# Patient Record
Sex: MALE | Race: WHITE | HISPANIC OR LATINO | Employment: FULL TIME | URBAN - METROPOLITAN AREA
[De-identification: names, ages, dates, MRNs, and addresses within clinical notes are randomized per-mention and may not be internally consistent; named-entity substitution may affect disease eponyms.]

---

## 2019-02-26 DIAGNOSIS — Z00.00 WELL ADULT EXAM: ICD-10-CM

## 2019-05-02 ENCOUNTER — HOSPITAL ENCOUNTER (OUTPATIENT)
Dept: RADIOLOGY | Facility: MEDICAL CENTER | Age: 59
End: 2019-05-02
Attending: FAMILY MEDICINE

## 2019-05-02 ENCOUNTER — HOSPITAL ENCOUNTER (OUTPATIENT)
Facility: MEDICAL CENTER | Age: 59
End: 2019-05-02
Attending: FAMILY MEDICINE
Payer: COMMERCIAL

## 2019-05-02 ENCOUNTER — NON-PROVIDER VISIT (OUTPATIENT)
Dept: OTHER | Facility: MEDICAL CENTER | Age: 59
End: 2019-05-02

## 2019-05-02 DIAGNOSIS — Z00.00 WELL ADULT EXAM: ICD-10-CM

## 2019-05-02 DIAGNOSIS — Z00.00 PHYSICAL EXAM, ROUTINE: ICD-10-CM

## 2019-05-02 LAB
25(OH)D3 SERPL-MCNC: 9 NG/ML (ref 30–100)
ALBUMIN SERPL BCP-MCNC: 4.8 G/DL (ref 3.2–4.9)
ALBUMIN/GLOB SERPL: 1.7 G/DL
ALP SERPL-CCNC: 75 U/L (ref 30–99)
ALT SERPL-CCNC: 37 U/L (ref 2–50)
ANION GAP SERPL CALC-SCNC: 10 MMOL/L (ref 0–11.9)
APPEARANCE UR: CLEAR
AST SERPL-CCNC: 25 U/L (ref 12–45)
BACTERIA #/AREA URNS HPF: NEGATIVE /HPF
BASOPHILS # BLD AUTO: 0.9 % (ref 0–1.8)
BASOPHILS # BLD: 0.06 K/UL (ref 0–0.12)
BILIRUB SERPL-MCNC: 0.7 MG/DL (ref 0.1–1.5)
BILIRUB UR QL STRIP.AUTO: NEGATIVE
BUN SERPL-MCNC: 17 MG/DL (ref 8–22)
CALCIUM SERPL-MCNC: 9.8 MG/DL (ref 8.5–10.5)
CHLORIDE SERPL-SCNC: 106 MMOL/L (ref 96–112)
CO2 SERPL-SCNC: 22 MMOL/L (ref 20–33)
COLOR UR: YELLOW
CREAT SERPL-MCNC: 0.81 MG/DL (ref 0.5–1.4)
CREAT UR-MCNC: 140.4 MG/DL
CRP SERPL HS-MCNC: 1.8 MG/L (ref 0–7.5)
EOSINOPHIL # BLD AUTO: 0.15 K/UL (ref 0–0.51)
EOSINOPHIL NFR BLD: 2.3 % (ref 0–6.9)
EPI CELLS #/AREA URNS HPF: NEGATIVE /HPF
ERYTHROCYTE [DISTWIDTH] IN BLOOD BY AUTOMATED COUNT: 41.2 FL (ref 35.9–50)
EST. AVERAGE GLUCOSE BLD GHB EST-MCNC: 111 MG/DL
GLOBULIN SER CALC-MCNC: 2.8 G/DL (ref 1.9–3.5)
GLUCOSE SERPL-MCNC: 117 MG/DL (ref 65–99)
GLUCOSE UR STRIP.AUTO-MCNC: NEGATIVE MG/DL
HBA1C MFR BLD: 5.5 % (ref 0–5.6)
HCT VFR BLD AUTO: 43.6 % (ref 42–52)
HCV AB SER QL: NEGATIVE
HGB BLD-MCNC: 15.6 G/DL (ref 14–18)
HYALINE CASTS #/AREA URNS LPF: ABNORMAL /LPF
IMM GRANULOCYTES # BLD AUTO: 0.01 K/UL (ref 0–0.11)
IMM GRANULOCYTES NFR BLD AUTO: 0.2 % (ref 0–0.9)
KETONES UR STRIP.AUTO-MCNC: NEGATIVE MG/DL
LEUKOCYTE ESTERASE UR QL STRIP.AUTO: NEGATIVE
LYMPHOCYTES # BLD AUTO: 1.64 K/UL (ref 1–4.8)
LYMPHOCYTES NFR BLD: 25.5 % (ref 22–41)
MCH RBC QN AUTO: 32.2 PG (ref 27–33)
MCHC RBC AUTO-ENTMCNC: 35.8 G/DL (ref 33.7–35.3)
MCV RBC AUTO: 90.1 FL (ref 81.4–97.8)
MICRO URNS: ABNORMAL
MICROALBUMIN UR-MCNC: 5.4 MG/DL
MICROALBUMIN/CREAT UR: 38 MG/G (ref 0–30)
MONOCYTES # BLD AUTO: 0.33 K/UL (ref 0–0.85)
MONOCYTES NFR BLD AUTO: 5.1 % (ref 0–13.4)
NEUTROPHILS # BLD AUTO: 4.23 K/UL (ref 1.82–7.42)
NEUTROPHILS NFR BLD: 66 % (ref 44–72)
NITRITE UR QL STRIP.AUTO: NEGATIVE
NRBC # BLD AUTO: 0 K/UL
NRBC BLD-RTO: 0 /100 WBC
PH UR STRIP.AUTO: 6 [PH]
PLATELET # BLD AUTO: 223 K/UL (ref 164–446)
PMV BLD AUTO: 9.6 FL (ref 9–12.9)
POTASSIUM SERPL-SCNC: 4.1 MMOL/L (ref 3.6–5.5)
PROT SERPL-MCNC: 7.6 G/DL (ref 6–8.2)
PROT UR QL STRIP: NEGATIVE MG/DL
PSA SERPL-MCNC: 1.24 NG/ML (ref 0–4)
RBC # BLD AUTO: 4.84 M/UL (ref 4.7–6.1)
RBC # URNS HPF: ABNORMAL /HPF
RBC UR QL AUTO: ABNORMAL
SODIUM SERPL-SCNC: 138 MMOL/L (ref 135–145)
SP GR UR STRIP.AUTO: 1.02
T4 FREE SERPL-MCNC: 0.75 NG/DL (ref 0.53–1.43)
TSH SERPL DL<=0.005 MIU/L-ACNC: 2.64 UIU/ML (ref 0.38–5.33)
UROBILINOGEN UR STRIP.AUTO-MCNC: 0.2 MG/DL
WBC # BLD AUTO: 6.4 K/UL (ref 4.8–10.8)
WBC #/AREA URNS HPF: ABNORMAL /HPF

## 2019-05-02 PROCEDURE — 306723 US-TOTAL VASCULAR SCREENING (S/P)

## 2019-05-02 PROCEDURE — 76700 US EXAM ABDOM COMPLETE: CPT

## 2019-05-02 PROCEDURE — 4410556 CT-CARDIAC SCORING

## 2019-05-02 PROCEDURE — 0126T US-CIMT DUPLEX: CPT

## 2019-05-02 PROCEDURE — 71046 X-RAY EXAM CHEST 2 VIEWS: CPT

## 2019-05-02 RX ORDER — PRAVASTATIN SODIUM 20 MG
20 TABLET ORAL NIGHTLY
COMMUNITY
End: 2019-06-03

## 2019-05-02 RX ORDER — OMEPRAZOLE 40 MG/1
40 CAPSULE, DELAYED RELEASE ORAL DAILY
COMMUNITY
End: 2019-06-03

## 2019-05-02 RX ORDER — M-VIT,TX,IRON,MINS/CALC/FOLIC 27MG-0.4MG
1 TABLET ORAL DAILY
COMMUNITY

## 2019-05-02 RX ORDER — AMLODIPINE BESYLATE AND BENAZEPRIL HYDROCHLORIDE 10; 20 MG/1; MG/1
1 CAPSULE ORAL DAILY
COMMUNITY
End: 2022-04-01 | Stop reason: SDUPTHER

## 2019-05-04 LAB
APO B100 SERPL-MCNC: 121 MG/DL (ref 55–140)
LPA SERPL-MCNC: 6 MG/DL
SHBG SERPL-SCNC: 32 NMOL/L (ref 11–80)
TESTOST FREE MFR SERPL: 1.9 % (ref 1.6–2.9)
TESTOST FREE SERPL-MCNC: 65 PG/ML (ref 47–244)
TESTOST SERPL-MCNC: 349 NG/DL (ref 300–890)

## 2019-05-06 LAB
CHOLEST SERPL-MCNC: 217 MG/DL
HDL PARTICAL NO Q4363: >41 UMOL/L
HDL SIZE Q4361: 8.1 NM
HDLC SERPL-MCNC: 46 MG/DL (ref 40–59)
HLD.LARGE SERPL-SCNC: <2.8 UMOL/L
L VLDL PART NO Q4357: 12.6 NMOL/L
LDL SERPL QN: 19.7 NM
LDL SERPL-SCNC: 1800 NMOL/L
LDL SMALL SERPL-SCNC: >1085 NMOL/L
LDLC SERPL CALC-MCNC: 100 MG/DL
PATHOLOGY STUDY: ABNORMAL
TRIGL SERPL-MCNC: 356 MG/DL (ref 30–149)
VLDL SIZE Q4362: 57.5 NM

## 2019-05-17 ENCOUNTER — NON-PROVIDER VISIT (OUTPATIENT)
Dept: OTHER | Facility: MEDICAL CENTER | Age: 59
End: 2019-05-17

## 2019-05-17 ENCOUNTER — OFFICE VISIT (OUTPATIENT)
Dept: OTHER | Facility: MEDICAL CENTER | Age: 59
End: 2019-05-17

## 2019-05-17 VITALS
BODY MASS INDEX: 29.38 KG/M2 | OXYGEN SATURATION: 95 % | DIASTOLIC BLOOD PRESSURE: 62 MMHG | RESPIRATION RATE: 12 BRPM | HEART RATE: 70 BPM | SYSTOLIC BLOOD PRESSURE: 138 MMHG | HEIGHT: 67 IN | WEIGHT: 187.2 LBS | TEMPERATURE: 98.9 F

## 2019-05-17 DIAGNOSIS — Z86.19 HISTORY OF HEPATITIS: ICD-10-CM

## 2019-05-17 DIAGNOSIS — D22.9 MULTIPLE PIGMENTED NEVI: ICD-10-CM

## 2019-05-17 DIAGNOSIS — K80.20 ASYMPTOMATIC GALLSTONES: ICD-10-CM

## 2019-05-17 DIAGNOSIS — Z83.3 FAMILY HISTORY OF DIABETES MELLITUS (DM): ICD-10-CM

## 2019-05-17 DIAGNOSIS — E88.819 INSULIN RESISTANCE: ICD-10-CM

## 2019-05-17 DIAGNOSIS — K21.9 GASTROESOPHAGEAL REFLUX DISEASE, ESOPHAGITIS PRESENCE NOT SPECIFIED: ICD-10-CM

## 2019-05-17 DIAGNOSIS — R93.1 AGATSTON CAC SCORE, >400: ICD-10-CM

## 2019-05-17 DIAGNOSIS — E55.9 VITAMIN D DEFICIENCY: ICD-10-CM

## 2019-05-17 DIAGNOSIS — Z00.00 WELL ADULT EXAM: Primary | ICD-10-CM

## 2019-05-17 DIAGNOSIS — R73.01 ELEVATED FASTING GLUCOSE: ICD-10-CM

## 2019-05-17 DIAGNOSIS — E78.5 DYSLIPIDEMIA: ICD-10-CM

## 2019-05-17 DIAGNOSIS — Z82.49 FAMILY HISTORY OF CHF (CONGESTIVE HEART FAILURE): ICD-10-CM

## 2019-05-17 DIAGNOSIS — L98.9 CHEST SKIN LESION: ICD-10-CM

## 2019-05-17 DIAGNOSIS — H91.93 HIGH FREQUENCY HEARING LOSS OF BOTH EARS: ICD-10-CM

## 2019-05-17 DIAGNOSIS — K14.8 LESION OF TONGUE: ICD-10-CM

## 2019-05-17 DIAGNOSIS — Z91.013 SHELLFISH ALLERGY: ICD-10-CM

## 2019-05-17 DIAGNOSIS — K13.0 LESION OF LIP: ICD-10-CM

## 2019-05-17 DIAGNOSIS — E88.810 METABOLIC SYNDROME: ICD-10-CM

## 2019-05-17 DIAGNOSIS — Z87.891 EX-SMOKER FOR MORE THAN 1 YEAR: ICD-10-CM

## 2019-05-17 DIAGNOSIS — R94.31 ABNORMAL ECG DURING EXERCISE STRESS TEST: ICD-10-CM

## 2019-05-17 DIAGNOSIS — Z82.3 FAMILY HISTORY OF STROKE OR TRANSIENT ISCHEMIC ATTACK IN MOTHER: ICD-10-CM

## 2019-05-17 DIAGNOSIS — Z23 NEED FOR SHINGLES VACCINE: ICD-10-CM

## 2019-05-17 DIAGNOSIS — I10 BENIGN ESSENTIAL HTN: ICD-10-CM

## 2019-05-17 DIAGNOSIS — R93.2 HIGHLY ECHOGENIC LIVER ON ULTRASOUND: ICD-10-CM

## 2019-05-17 DIAGNOSIS — Z82.49 FAMILY HISTORY OF HEART DISEASE: ICD-10-CM

## 2019-05-17 DIAGNOSIS — K44.9 HIATAL HERNIA: ICD-10-CM

## 2019-05-17 NOTE — LETTER
"May 26, 2019        Chalo Hollis  [address]  [place] [address]      CC: Dr. Mady Mercado MD    Dear Víctor:    Thank you for participating in [facility].  I enjoyed meeting you today and performing your Executive History and Physical examination.  We covered a great deal of information, and I have summarized my Assessment and Plan below.  Please carefully review all the information in this packet.  I do suggest you schedule an appointment with a Primary Care Provider soon to review the results of your examination and develop a plan moving forward. Also, I am referring you to see a Cardiology Provider in light of the cardio-metabolic issues we identified.     The cardio metabolic issues we talked about today include: Atherosclerotic plaque of your coronary/heart arteries, abnormal exercise stress ECG, overweight status, increased waist circumference, metabolic syndrome, insulin resistance, hypertension, family history, remote smoking history, dyslipidemia, elevated fasting glucose, vitamin D deficiency, echogenic liver on ultrasound, and asymptomatic gallstones.  In addition to seeing your primary care physician and a cardiology provider, I do recommend the following: The best treatment for most of these conditions is Therapeutic Lifestyle Changes.  Specifically, I strongly recommend eating \"real food, mostly plants, not too much\", daily exercise, striving for a normal weight/BMI, active stress management, 7-8 hours of quality sleep per night, a loving social environment, and an altruistic philosophy.  In light of the tongue and skin lesions we discussed, I do recommend you discuss these with your primary care physician and consideration be made for a dermatology consultation and specialist consultation for tongue lesion.  Please obtain the shingles vaccine series, complete the advance directives paperwork I dispensed, and obtain your next screening colonoscopy in the fall 2019.  Please see " "my comments below regarding medications considerations.     If you have any questions or concerns, please don't hesitate to call.        Sincerely,        Chito Blunt M.D.    ASSESSMENT:    Encounter Diagnoses   Name Primary?   • Executive History and Physical Examination Yes   • Agatston CAC score, 1365, multi-vessel, asymptomatic    • Abnormal ECG during exercise stress test    • BMI 29.0-29.9, Waist Circumference: 41\"    • Metabolic syndrome    • Insulin resistance    • Benign essential HTN    • Family history of heart disease    • Family history of diabetes mellitus (DM)    • Family history of stroke or transient ischemic attack in mother    • Family history of CHF (congestive heart failure)    • Ex-smoker for more than 1 year    • Shellfish allergy    • High frequency hearing loss of both ears    • History of hepatitis    • Gastroesophageal reflux disease, esophagitis presence not specified    • Hiatal hernia    • Dyslipidemia    • Elevated fasting glucose    • Vitamin D deficiency    • Lesion of tongue    • Lesion of lip    • Multiple pigmented skin lesions    • Right pectoral area irregular pigmented skin lesion, changing    • Highly echogenic liver on ultrasound    • Asymptomatic gallstones    • Need for shingles vaccine        PLAN:    Total Face-to-Face time spent with patient: 90 minutes  Amount of time spent counseling patient and/or coordinating care: 50 minutes    The nature of patient counseling as below:  -Patient Education  -Differential Diagnoses and treatment options discussed  -Risks, benefits, alternatives discussed  -Labs reviewed with patient in detail  -Imaging/ETT/Spirometry/vision/hearing reports reviewed with patient in detail  -Health Maintenance Exam issues discussed  -Exercise recommendations  -Dietary recommendations discussed  -Weight Loss strategies discussed  -Therapeutic Lifestyle Changes discussed    The nature of coordination of care as below:  -Medications added: He currently " takes Amlodipine-Benazepril and Pravastatin and aspirin. Patient to keep daily BP diary and present this to Primary Care Provider at next visit. Primary Care Provider may wish to consider change to Rosuvastatin. I do recommend Vitamin D3 OTC 5000 IU/day supplementation  -Continue present chronic medications  -Referrals: I recommend patient schedule appointment with Primary Care Provider soon to review results of today's examination and develop a plan moving forward, particularly with regard to cardio-metabolic issues and tongue/skin lesions   -I have referred patient to Cardiology in light of above diagnoses   -Other: Shingrix vaccine series recommended. Advance Directives paperwork discussed and dispensed. He will be due for repeat screening Colonoscopy in fall 2019.   -Seek medical attention immediately if worse    FOLLOW-UP:  -With Primary Care Provider soon  -Cardiology referral initiated.        Electronically Signed

## 2019-05-17 NOTE — PROGRESS NOTES
"[facility] madvertise [facility]    EXECUTIVE HISTORY AND PHYSICAL  Performed by Dr. Chito Blunt    SUBJECTIVE:    Chief Complaint   Patient presents with   • Executive Physical   • Other     Abnormal Exercise Treadmill Stress Test   • Coronary Artery Disease     CT-Cardiac Score: 1365 (multivessel)   • Other     Overweight Status   • Hypertension   • Gastrophageal Reflux     & Hiatal Hernia   • Other     Family history of: stroke, MI, CHF, DM   • Other     Remote ex-smoker   • Hyperlipidemia   • Other     Metabolic Syndrome & Insulin Resistance   • Hyperglycemia   • Abnormal Labs   • Vitamin D Deficiency   • Hearing Problem     Some High Frequency hearing loss   • Skin Lesion     left upper lip, changing   • Other     Tongue lesion   • Other     Increased Liver echogenicity on U/S   • Gallstones     Asymptomatic, seen on U/S   • Other     Due for Shingrix series       Víctor Hollis is a 58 y.o. male,   New Patient @ [facility]    Preventive medicine issues discussed:  abuse, aspirin, dental, Alcohol, Tobacco, HIV/AIDS, injuries, mental health/depression, nutrition, exercise, occupational health, sexual behavior, UV exposure, advance directives, Cancer Screening. Vaccines.      PROBLEM #1-HISTORY OF PRESENT ILLNESS  PATIENT STATEMENT OF PROBLEM - Cardio-metabolic related issues  ONSET - He is currently taking anti-HTN and statin and aspirin medications  COURSE - Asymptomatic. No ASCVD event history. Remote ex-smoker. Family history: HTN, \"heart disease\", DM, CHF, CVA. BMI: 29.7. WC: 41\". BP: 138/62. See abnormal, asymptomatic, Exercise Treadmill Stress results below. Current pertinent imaging: CTCS: 1365 (multivessel), TVS: no evidence of atherosclerosis. CIMT: 42 vs 58 years. U/S: increased liver echogenicity, and gallstones (patient not previously aware, asymptomatic). Current pertinent labs:    HIGH & INT RISK: " "Tchol/TRIG/LDL-P/SmallLDL/L-VLDL/L-HDL/LDL-S/VLDL-S/HDL-S/FBS(117)/UACR(38)/ApoB/hsCRP/VitaminD(9)/TRIG:HDL(7.7).    Counseled at length. He meets criteria for both Metabolic Syndrome and Insulin Resistance  INTENSITY/STATUS/LOCATION/RADIATION - variable/asymptomatic, subclinical/as above  AGGRAVATORS - Multifactorial including inadequate Therapeutic Lifestyle Changes, genetics, others?  RELIEVERS - medications?  TREATMENTS/COMPLIANCE/@GOAL? - as above/ inadequate Therapeutic Lifestyle Changes/ no     PROBLEM #2-HISTORY OF PRESENT ILLNESS  PATIENT STATEMENT OF PROBLEM - Skin & Tongue lesions  ONSET - variable  COURSE - He has a 3x3 mg flesh colored lesion of right distal tongue, present for past 2 years. Chronic raised, tan colored, rounded lesion left upper lip area that patient states is enlarging. Also, multiple pigmented lesions including 6mm diameter pigmented somewhat irregular bordered pigmented lesion of right pectoral area that may be changing/enlarging. Counseled.  INTENSITY/STATUS/LOCATION/RADIATION - as above/ as above/ as above/ no   AGGRAVATORS - ?  RELIEVERS - observation   TREATMENTS/COMPLIANCE/@GOAL? - as above/ na/ no     PROBLEM #3-HISTORY OF PRESENT ILLNESS  PATIENT STATEMENT OF PROBLEM - Health Maintenance issues  ONSET - discussed today  COURSE - he is due for Shingrix vaccine series. He will be due for \"every 5 year\" Colonoscopy in Fall of 2019. I discussed and dispensed Advance Directives paperwork.     PROBLEM #4-HISTORY OF PRESENT ILLNESS  Existing Chronic conditions  GERD & Hiatal hernia  Some high frequency hearing loss  History of \"hepatitis\" in 2006    Diagnosing METABOLIC SYNDROME  -?-Must have 3 or more of the following 5 Risk Factors(Patient meets criteria # 1,2,4,5)     RISK FACTOR    DEFINING LEVEL  1-Abdominal Obesity        Waist [email or web address]   Men (North Americans)  >102 cm (>40 inches)   Women (North Americans)  >88 cm (>35 inches)  (see literature for " "Ethnic Group waist circumference differences)  2-Triglycerides ?150 mg/dL (or on treatment for this lipid disorder)  3-HDL Cholesterol    Men  <40 mg/dL (or on treatment for this lipid disorder)   Women <50 mg/dL (or on treatment for this lipid disorder)  4-Blood Pressure  ?130 systolic or ?84 diastolic (or on HTN treatment)  5-Fasting Glucose ?100 mg/dL(or previously diagnosed DM or Ins. Resistance)  (FYI: If FBS ?100 mg/dL, then patient also has Insulin Resistance)    Synonyms  Hypertension-hyperglycemia-hyperuricemia syndrome   Syndrome X   Dysmetabolic syndrome X   Insulin resistance syndrome   Metabolic dyslipidemia   The deadly quartet (upper-body obesity, glucose intolerance, hypertriglyceridemia, and hypertension)   Civilization syndrome  Reaven Syndrome    Diagnosing INSULIN RESISTANCE: Any 1 of following (Patient meets criteria # 1,2,3)  1. Presence of METABOLIC SYNDROME  2. TRIGLYCERIDE/HDL RATIO:  - >3.5 = IR in Caucasians  - ?3.0 = IR in /Vietnamese Americans  - ?2.0 = IR in Non- Blacks  3. Fasting Blood Sugar ? 100 mg/dL         (If FBS > 126, then DM2)  4. Oral Glucose Tolerance Test   - One hour glucose: ? 125 mg/dL   - (If > 150, significantly increased risk of developing DM2)  - Two hour glucose: ? 120 mg/dL  - (120-139=only 33% B-cell fx. 140-199=only 15% B-cell fx)   - (200 or above=DM2 and only 10% B-cell fx.)  - PRE-DIABETES, a type of Insulin Resistance:  o Two hour glucose of 140 to 199  5. A1c ? 6.5%                     (or ? 5.7 % AND the following 2 Dental Parameters:    1- ? 26% of Gum Pockets are ?5mm depth    2- ? 4 Missing Teeth)      Allergies   Allergen Reactions   • Other Food Itching     Shell fish; Pt states \"at times skin rash and/or puffy/itchy eyes\"       There are no active problems to display for this patient.      Current Outpatient Prescriptions on File Prior to Visit   Medication Sig Dispense Refill   • amlodipine-benazepril (LOTREL) 10-20 MG per capsule Take " "1 Cap by mouth every day.     • pravastatin (PRAVACHOL) 20 MG Tab Take 20 mg by mouth every evening. Per Dr. Mercado increase dosage to 40mg on Feb 13, 2019 for 3 months     • omeprazole (PRILOSEC) 40 MG delayed-release capsule Take 40 mg by mouth every day.     • aspirin 81 MG tablet Take 81 mg by mouth every day.     • therapeutic multivitamin-minerals (THERAGRAN-M) Tab Take 1 Tab by mouth every day.     • Probiotic Product (PROBIOTIC FORMULA PO) Take  by mouth.     • Fexofenadine HCl (ALLEGRA ALLERGY PO) Take  by mouth.       No current facility-administered medications on file prior to visit.        Social History     Social History   • Marital status:      Spouse name: N/A   • Number of children: N/A   • Years of education: N/A     Occupational History   • Not on file.     Social History Main Topics   • Smoking status: Former Smoker     Packs/day: 0.25     Years: 3.00     Types: Cigarettes     Start date: 1980     Quit date: 1983   • Smokeless tobacco: Never Used      Comment: \"3-4 cigarettes per day average\"   • Alcohol use Yes      Comment: 2-3 drinks/day    • Drug use: No   • Sexual activity: Not on file     Other Topics Concern   • Not on file     Social History Narrative   • No narrative on file       Family History   Problem Relation Age of Onset   • Hypertension Mother    • Diabetes Father    • Hypertension Sister    • Hypertension Brother    • Diabetes Brother    • Hypertension Son        Patient's Past, Social, and Family History reviewed     REVIEW OF SYMPTOMS:               Pertinent Positives as above.    All other systems reviewed and negative.     OBJECTIVE:    /62   Pulse 70   Temp 37.2 °C (98.9 °F) (Temporal)   Resp 12   Ht 1.689 m (5' 6.5\")   Wt 84.9 kg (187 lb 3.2 oz)   SpO2 95%   BMI 29.76 kg/m²   Body mass index is 29.76 kg/m².    Well developed, well nourished overweight male, no acute distress, non-ill appearing. Comfortable, appears stated age, pleasant and cooperative, " Alert and Oriented x 3.   HEAD: atraumatic, normocephalic   EYES: Conjunctiva normal, EOMI, PERRLA, acuity grossly intact.   EARS/NOSE/THROAT: TM's normal, no SSX of infection, no perforation, no hemotympanum, acuity grossly intact. Oropharynx: 3x3 mg flesh colored lesion of right distal tongue, present for past 2 years. Nares: benign.   NECK: supple, no adenopathy, no thyromegaly or nodules, no JVD, no carotid bruits.   CHEST/LUNGS: clear to auscultation and percussion bilaterally. No adventitious breath sounds.   CARDIOVASCULAR: regular rate and rhythm, no murmur. PMI not displaced. Good central and peripheral pulses.   BACK: no CVA tenderness.   ABDOMEN: soft, non-tender, non-distended, no masses, no hepatosplenomegaly. Normal active bowel tones.   : deferred.   Rectal: deferred.   Extremities: warm/well-perfused, no cyanosis, clubbing, or edema.   SKIN:  He has a 3x3 mg flesh colored lesion of right distal tongue, present for past 2 years. Chronic raised, tan colored, rounded lesion left upper lip area that patient states is enlarging. Also, multiple pigmented lesions including 6mm diameter pigmented somewhat irregular bordered pigmented lesion of right pectoral area that may be changing/enlarging.   Neuro: Mental Status: Alert and Oriented x 3. CN II-XII grossly intact. Gait normal. Non-focal, intact. Normal strength, sensation    EXERCISE STRESS TEST REPORT:    Interpreted by me    INTERPRETATION:  Patient achieved 97% of maximum predicted heart rate with physiological response in blood pressure. Horizontal or downsloping ST depression with exercise in II/II/aVF/V4-V6. ST elevation in aVR/V1/V2. Asymptomatic. No significant ventricular extrasystoles, no ventricular tachycardia, no new atrial fibrillation or supraventricular tachycardia, and  no new heart blocks.  Patient denied chest pain, severe dyspnea, dizziness, or ataxia.     CONCLUSION:  Abnormal Exercise Stress Test with asymptomatic horizontal or  "downsloping ST depression with exercise in II/II/aVF/V4-V6, and ST elevation in aVR/V1/V2.     ASSESSMENT:    Encounter Diagnoses   Name Primary?   • Executive History and Physical Examination Yes   • Agatston CAC score, 1365, multi-vessel, asymptomatic    • Abnormal ECG during exercise stress test    • BMI 29.0-29.9, Waist Circumference: 41\"    • Metabolic syndrome    • Insulin resistance    • Benign essential HTN    • Family history of heart disease    • Family history of diabetes mellitus (DM)    • Family history of stroke or transient ischemic attack in mother    • Family history of CHF (congestive heart failure)    • Ex-smoker for more than 1 year    • Shellfish allergy    • High frequency hearing loss of both ears    • History of hepatitis    • Gastroesophageal reflux disease, esophagitis presence not specified    • Hiatal hernia    • Dyslipidemia    • Elevated fasting glucose    • Vitamin D deficiency    • Lesion of tongue    • Lesion of lip    • Multiple pigmented skin lesions    • Right pectoral area irregular pigmented skin lesion, changing    • Highly echogenic liver on ultrasound    • Asymptomatic gallstones    • Need for shingles vaccine        PLAN:    Total Face-to-Face time spent with patient: 90 minutes  Amount of time spent counseling patient and/or coordinating care: 50 minutes    The nature of patient counseling as below:  -Patient Education  -Differential Diagnoses and treatment options discussed  -Risks, benefits, alternatives discussed  -Labs reviewed with patient in detail  -Imaging/ETT/Spirometry/vision/hearing reports reviewed with patient in detail  -Health Maintenance Exam issues discussed  -Exercise recommendations  -Dietary recommendations discussed  -Weight Loss strategies discussed  -Therapeutic Lifestyle Changes discussed    The nature of coordination of care as below:  -Medications added: He currently takes Amlodipine-Benazepril and Pravastatin and aspirin. Patient to keep daily BP " diary and present this to Primary Care Provider at next visit. Primary Care Provider may wish to consider change to Rosuvastatin. I do recommend Vitamin D3 OTC 5000 IU/day supplementation  -Continue present chronic medications  -Referrals: I recommend patient schedule appointment with Primary Care Provider soon to review results of today's examination and develop a plan moving forward, particularly with regard to cardio-metabolic issues and tongue/skin lesions   -I have referred patient to Cardiology in light of above diagnoses   -Other: Shingrix vaccine series recommended. Advance Directives paperwork discussed and dispensed. He will be due for repeat screening Colonoscopy in fall 2019.   -Seek medical attention immediately if worse    FOLLOW-UP:  -With Primary Care Provider soon  -Cardiology referral initiated.

## 2019-05-26 NOTE — PATIENT INSTRUCTIONS
Current Outpatient Prescriptions Ordered in [facility]   Medication Sig Dispense Refill   • amlodipine-benazepril (LOTREL) 10-20 MG per capsule Take 1 Cap by mouth every day.     • pravastatin (PRAVACHOL) 20 MG Tab Take 20 mg by mouth every evening. Per Dr. Mercado increase dosage to 40mg on Feb 13, 2019 for 3 months     • omeprazole (PRILOSEC) 40 MG delayed-release capsule Take 40 mg by mouth every day.     • aspirin 81 MG tablet Take 81 mg by mouth every day.     • therapeutic multivitamin-minerals (THERAGRAN-M) Tab Take 1 Tab by mouth every day.     • Probiotic Product (PROBIOTIC FORMULA PO) Take  by mouth.     • Fexofenadine HCl (ALLEGRA ALLERGY PO) Take  by mouth.       No current Epic-ordered facility-administered medications on file.

## 2019-05-30 ENCOUNTER — TELEPHONE (OUTPATIENT)
Dept: CARDIOLOGY | Facility: MEDICAL CENTER | Age: 59
End: 2019-05-30

## 2019-05-30 ENCOUNTER — OFFICE VISIT (OUTPATIENT)
Dept: CARDIOLOGY | Facility: MEDICAL CENTER | Age: 59
End: 2019-05-30
Payer: COMMERCIAL

## 2019-05-30 DIAGNOSIS — R94.39 ABNORMAL STRESS TEST: ICD-10-CM

## 2019-05-30 PROCEDURE — 99204 OFFICE O/P NEW MOD 45 MIN: CPT | Performed by: INTERNAL MEDICINE

## 2019-05-30 ASSESSMENT — ENCOUNTER SYMPTOMS
HEADACHES: 0
BRUISES/BLEEDS EASILY: 0
SHORTNESS OF BREATH: 0
EYE DISCHARGE: 0
CHILLS: 0
NERVOUS/ANXIOUS: 0
MYALGIAS: 0
BLURRED VISION: 0
PND: 0
HEARTBURN: 0
NAUSEA: 0
DEPRESSION: 0
FEVER: 0
DIZZINESS: 0
COUGH: 0
PALPITATIONS: 0

## 2019-05-30 NOTE — TELEPHONE ENCOUNTER
Patient is scheduled on 6-5-19 for a C w/poss with Dr. Childers. No meds to stop and patient to check in at 9:00 for an 11:00 procedure. H&P was done on 5-30-19 by Dr. Khan. Pre admit is scheduled on 6-3-19 at 8:15.

## 2019-05-30 NOTE — TELEPHONE ENCOUNTER
Patient is scheduled on 6-5-19 for a C w/poss with Dr. Childers. No meds to stop and pt to check in at 9:00 for an 11:00 procedure. H&P was done on 5-30-19 by Dr. Khan. Pre admit is scheduled on 6-3-19 at 8:15.

## 2019-05-30 NOTE — PROGRESS NOTES
"Chief Complaint   Patient presents with   • Abnormal Cardiac Function Testing       Subjective:   Víctor Hollis is a 58 y.o. male who presents today in consultation at the request of Dr. Chito Blunt for abnormal stress test while undergoing regular physical exam.  This gentleman has metabolic syndrome with treated hypertension, slight elevation of glycohemoglobin and treated hyperlipidemia.    He remains asymptomatic vis-à-vis his heart.  Recent treadmill stress test demonstrated up to 4 mm of ST segment depression during physical stress.  He also has a very high coronary artery calcium score of greater than 1300.    Past Medical History:   Diagnosis Date   • Allergy    • Hepatitis 2006   • Hiatal hernia    • Hypertension      Past Surgical History:   Procedure Laterality Date   • TENDON REPAIR Right 2008     Family History   Problem Relation Age of Onset   • Hypertension Mother    • Diabetes Father    • Hypertension Sister    • Hypertension Brother    • Diabetes Brother    • Hypertension Son      Social History     Social History   • Marital status:      Spouse name: N/A   • Number of children: N/A   • Years of education: N/A     Occupational History   • Not on file.     Social History Main Topics   • Smoking status: Former Smoker     Packs/day: 0.25     Years: 3.00     Types: Cigarettes     Start date: 1980     Quit date: 1983   • Smokeless tobacco: Never Used      Comment: \"3-4 cigarettes per day average\"   • Alcohol use Yes      Comment: 2-3 drinks/day    • Drug use: No   • Sexual activity: Not on file     Other Topics Concern   • Not on file     Social History Narrative   • No narrative on file     Allergies   Allergen Reactions   • Other Food Itching     Shell fish; Pt states \"at times skin rash and/or puffy/itchy eyes\"     Outpatient Encounter Prescriptions as of 5/30/2019   Medication Sig Dispense Refill   • metoprolol (LOPRESSOR) 25 MG Tab Take 1 Tab by mouth 2 times a day. 60 Tab 11   • " amlodipine-benazepril (LOTREL) 10-20 MG per capsule Take 1 Cap by mouth every day.     • pravastatin (PRAVACHOL) 20 MG Tab Take 20 mg by mouth every evening. Per Dr. Mercado increase dosage to 40mg on Feb 13, 2019 for 3 months     • omeprazole (PRILOSEC) 40 MG delayed-release capsule Take 40 mg by mouth every day.     • aspirin 81 MG tablet Take 81 mg by mouth every day.     • therapeutic multivitamin-minerals (THERAGRAN-M) Tab Take 1 Tab by mouth every day.     • Probiotic Product (PROBIOTIC FORMULA PO) Take  by mouth.     • Fexofenadine HCl (ALLEGRA ALLERGY PO) Take  by mouth.       No facility-administered encounter medications on file as of 5/30/2019.      Review of Systems   Constitutional: Negative for chills, fever and malaise/fatigue.   Eyes: Negative for blurred vision and discharge.   Respiratory: Negative for cough and shortness of breath.    Cardiovascular: Negative for chest pain, palpitations, leg swelling and PND.   Gastrointestinal: Negative for heartburn and nausea.   Genitourinary: Negative for dysuria and urgency.   Musculoskeletal: Negative for myalgias.   Skin: Negative for itching and rash.   Neurological: Negative for dizziness and headaches.   Endo/Heme/Allergies: Negative for environmental allergies. Does not bruise/bleed easily.   Psychiatric/Behavioral: Negative for depression. The patient is not nervous/anxious.         Objective:   There were no vitals taken for this visit.    Physical Exam   Constitutional: He is oriented to person, place, and time. He appears well-developed and well-nourished.   Neck: No JVD present.   Cardiovascular: Normal rate, regular rhythm and intact distal pulses.  Exam reveals no gallop and no friction rub.    No murmur heard.  Abdominal: Soft. There is no tenderness.   Musculoskeletal: He exhibits no edema.   Neurological: He is alert and oriented to person, place, and time.   Skin: Skin is warm and dry.       Assessment:     1. Abnormal stress test  CL-LEFT  HEART CATHETERIZATION WITH POSSIBLE INTERVENTION       Medical Decision Making:  Today's Assessment / Status / Plan:   This patient is at high risk of having asymptomatic severe coronary artery disease.  The benefits alternatives and risks of procedure including conscious sedation have been explained to the patient and he is agreed to have diagnostic coronary arteriography with with possible intervention as needed.  Procedure is scheduled as soon as we can get it on the outpatient schedule.  In the meantime he is to increase pravastatin to 80 mg/day  Metoprolol 25 mg twice daily has been prescribed.  If symptoms that suggest angina occur, it is recommended he call 911  Thank you for this consultation

## 2019-05-30 NOTE — LETTER
"     [facility]   [address]  [place] [address]  Phone: [phone]  Fax: [phone]              Víctor Hollis  1960    Encounter Date: 5/30/2019    Jaiden Khan M.D.          PROGRESS NOTE:  Chief Complaint   Patient presents with   • Abnormal Cardiac Function Testing       Subjective:   Víctor Hollis is a 58 y.o. male who presents today in consultation at the request of Dr. Chito Blunt for abnormal stress test while undergoing regular physical exam.  This gentleman has metabolic syndrome with treated hypertension, slight elevation of glycohemoglobin and treated hyperlipidemia.    He remains asymptomatic vis-à-vis his heart.  Recent treadmill stress test demonstrated up to 4 mm of ST segment depression during physical stress.  He also has a very high coronary artery calcium score of greater than 1300.    Past Medical History:   Diagnosis Date   • Allergy    • Hepatitis 2006   • Hiatal hernia    • Hypertension      Past Surgical History:   Procedure Laterality Date   • TENDON REPAIR Right 2008     Family History   Problem Relation Age of Onset   • Hypertension Mother    • Diabetes Father    • Hypertension Sister    • Hypertension Brother    • Diabetes Brother    • Hypertension Son      Social History     Social History   • Marital status:      Spouse name: N/A   • Number of children: N/A   • Years of education: N/A     Occupational History   • Not on file.     Social History Main Topics   • Smoking status: Former Smoker     Packs/day: 0.25     Years: 3.00     Types: Cigarettes     Start date: 1980     Quit date: 1983   • Smokeless tobacco: Never Used      Comment: \"3-4 cigarettes per day average\"   • Alcohol use Yes      Comment: 2-3 drinks/day    • Drug use: No   • Sexual activity: Not on file     Other Topics Concern   • Not on file     Social History Narrative   • No narrative on file     Allergies   Allergen Reactions   • Other Food " "Itching     Shell fish; Pt states \"at times skin rash and/or puffy/itchy eyes\"     Outpatient Encounter Prescriptions as of 5/30/2019   Medication Sig Dispense Refill   • metoprolol (LOPRESSOR) 25 MG Tab Take 1 Tab by mouth 2 times a day. 60 Tab 11   • amlodipine-benazepril (LOTREL) 10-20 MG per capsule Take 1 Cap by mouth every day.     • pravastatin (PRAVACHOL) 20 MG Tab Take 20 mg by mouth every evening. Per Dr. Mercado increase dosage to 40mg on Feb 13, 2019 for 3 months     • omeprazole (PRILOSEC) 40 MG delayed-release capsule Take 40 mg by mouth every day.     • aspirin 81 MG tablet Take 81 mg by mouth every day.     • therapeutic multivitamin-minerals (THERAGRAN-M) Tab Take 1 Tab by mouth every day.     • Probiotic Product (PROBIOTIC FORMULA PO) Take  by mouth.     • Fexofenadine HCl (ALLEGRA ALLERGY PO) Take  by mouth.       No facility-administered encounter medications on file as of 5/30/2019.      Review of Systems   Constitutional: Negative for chills, fever and malaise/fatigue.   Eyes: Negative for blurred vision and discharge.   Respiratory: Negative for cough and shortness of breath.    Cardiovascular: Negative for chest pain, palpitations, leg swelling and PND.   Gastrointestinal: Negative for heartburn and nausea.   Genitourinary: Negative for dysuria and urgency.   Musculoskeletal: Negative for myalgias.   Skin: Negative for itching and rash.   Neurological: Negative for dizziness and headaches.   Endo/Heme/Allergies: Negative for environmental allergies. Does not bruise/bleed easily.   Psychiatric/Behavioral: Negative for depression. The patient is not nervous/anxious.         Objective:   There were no vitals taken for this visit.    Physical Exam   Constitutional: He is oriented to person, place, and time. He appears well-developed and well-nourished.   Neck: No JVD present.   Cardiovascular: Normal rate, regular rhythm and intact distal pulses.  Exam reveals no gallop and no friction rub.    No " murmur heard.  Abdominal: Soft. There is no tenderness.   Musculoskeletal: He exhibits no edema.   Neurological: He is alert and oriented to person, place, and time.   Skin: Skin is warm and dry.       Assessment:     1. Abnormal stress test  CL-LEFT HEART CATHETERIZATION WITH POSSIBLE INTERVENTION       Medical Decision Making:  Today's Assessment / Status / Plan:   This patient is at high risk of having asymptomatic severe coronary artery disease.  The benefits alternatives and risks of procedure including conscious sedation have been explained to the patient and he is agreed to have diagnostic coronary arteriography with with possible intervention as needed.  Procedure is scheduled as soon as we can get it on the outpatient schedule.  In the meantime he is to increase pravastatin to 80 mg/day  Metoprolol 25 mg twice daily has been prescribed.  If symptoms that suggest angina occur, it is recommended he call 911  Thank you for this consultation      No Recipients

## 2019-05-30 NOTE — LETTER
"     [facility]   [address]  [place] [address]  Phone: [phone]  Fax: [phone]              Víctor Hollis  1960    Encounter Date: 5/30/2019    Jaiden Khan M.D.          PROGRESS NOTE:  Chief Complaint   Patient presents with   • Abnormal Cardiac Function Testing       Subjective:   Víctor Hollis is a 58 y.o. male who presents today in consultation at the request of Dr. Chito Lynn for abnormal stress test while undergoing regular physical exam.  This gentleman has metabolic syndrome with treated hypertension, slight elevation of glycohemoglobin and treated hyperlipidemia.    He remains asymptomatic vis-à-vis his heart.  Recent treadmill stress test demonstrated up to 4 mm of ST segment depression during physical stress.  He also has a very high coronary artery calcium score of greater than 1300.    Past Medical History:   Diagnosis Date   • Allergy    • Hepatitis 2006   • Hiatal hernia    • Hypertension      Past Surgical History:   Procedure Laterality Date   • TENDON REPAIR Right 2008     Family History   Problem Relation Age of Onset   • Hypertension Mother    • Diabetes Father    • Hypertension Sister    • Hypertension Brother    • Diabetes Brother    • Hypertension Son      Social History     Social History   • Marital status:      Spouse name: N/A   • Number of children: N/A   • Years of education: N/A     Occupational History   • Not on file.     Social History Main Topics   • Smoking status: Former Smoker     Packs/day: 0.25     Years: 3.00     Types: Cigarettes     Start date: 1980     Quit date: 1983   • Smokeless tobacco: Never Used      Comment: \"3-4 cigarettes per day average\"   • Alcohol use Yes      Comment: 2-3 drinks/day    • Drug use: No   • Sexual activity: Not on file     Other Topics Concern   • Not on file     Social History Narrative   • No narrative on file     Allergies   Allergen Reactions   • Other Food " "Itching     Shell fish; Pt states \"at times skin rash and/or puffy/itchy eyes\"     Outpatient Encounter Prescriptions as of 5/30/2019   Medication Sig Dispense Refill   • metoprolol (LOPRESSOR) 25 MG Tab Take 1 Tab by mouth 2 times a day. 60 Tab 11   • amlodipine-benazepril (LOTREL) 10-20 MG per capsule Take 1 Cap by mouth every day.     • pravastatin (PRAVACHOL) 20 MG Tab Take 20 mg by mouth every evening. Per Dr. Mercado increase dosage to 40mg on Feb 13, 2019 for 3 months     • omeprazole (PRILOSEC) 40 MG delayed-release capsule Take 40 mg by mouth every day.     • aspirin 81 MG tablet Take 81 mg by mouth every day.     • therapeutic multivitamin-minerals (THERAGRAN-M) Tab Take 1 Tab by mouth every day.     • Probiotic Product (PROBIOTIC FORMULA PO) Take  by mouth.     • Fexofenadine HCl (ALLEGRA ALLERGY PO) Take  by mouth.       No facility-administered encounter medications on file as of 5/30/2019.      Review of Systems   Constitutional: Negative for chills, fever and malaise/fatigue.   Eyes: Negative for blurred vision and discharge.   Respiratory: Negative for cough and shortness of breath.    Cardiovascular: Negative for chest pain, palpitations, leg swelling and PND.   Gastrointestinal: Negative for heartburn and nausea.   Genitourinary: Negative for dysuria and urgency.   Musculoskeletal: Negative for myalgias.   Skin: Negative for itching and rash.   Neurological: Negative for dizziness and headaches.   Endo/Heme/Allergies: Negative for environmental allergies. Does not bruise/bleed easily.   Psychiatric/Behavioral: Negative for depression. The patient is not nervous/anxious.         Objective:   There were no vitals taken for this visit.    Physical Exam   Constitutional: He is oriented to person, place, and time. He appears well-developed and well-nourished.   Neck: No JVD present.   Cardiovascular: Normal rate, regular rhythm and intact distal pulses.  Exam reveals no gallop and no friction rub.    No " murmur heard.  Abdominal: Soft. There is no tenderness.   Musculoskeletal: He exhibits no edema.   Neurological: He is alert and oriented to person, place, and time.   Skin: Skin is warm and dry.       Assessment:     1. Abnormal stress test  CL-LEFT HEART CATHETERIZATION WITH POSSIBLE INTERVENTION       Medical Decision Making:  Today's Assessment / Status / Plan:   This patient is at high risk of having asymptomatic severe coronary artery disease.  The benefits alternatives and risks of procedure including conscious sedation have been explained to the patient and he is agreed to have diagnostic coronary arteriography with with possible intervention as needed.  Procedure is scheduled as soon as we can get it on the outpatient schedule.  In the meantime he is to increase pravastatin to 80 mg/day  Metoprolol 25 mg twice daily has been prescribed.  If symptoms that suggest angina occur, it is recommended he call 911  Thank you for this consultation      Mady Mercado M.D.  [address]  [place] [address]  VIA Facsimile: [phone]     Chito Blunt M.D.  [address]  [place] [address]  VIA In Basket

## 2019-06-03 DIAGNOSIS — Z01.812 PRE-OPERATIVE LABORATORY EXAMINATION: ICD-10-CM

## 2019-06-03 DIAGNOSIS — Z01.810 PRE-OPERATIVE CARDIOVASCULAR EXAMINATION: ICD-10-CM

## 2019-06-03 LAB
ALBUMIN SERPL BCP-MCNC: 4.8 G/DL (ref 3.2–4.9)
ALBUMIN/GLOB SERPL: 1.9 G/DL
ALP SERPL-CCNC: 68 U/L (ref 30–99)
ALT SERPL-CCNC: 32 U/L (ref 2–50)
ANION GAP SERPL CALC-SCNC: 9 MMOL/L (ref 0–11.9)
APTT PPP: 28.8 SEC (ref 24.7–36)
AST SERPL-CCNC: 24 U/L (ref 12–45)
BILIRUB SERPL-MCNC: 0.8 MG/DL (ref 0.1–1.5)
BUN SERPL-MCNC: 13 MG/DL (ref 8–22)
CALCIUM SERPL-MCNC: 9.3 MG/DL (ref 8.5–10.5)
CHLORIDE SERPL-SCNC: 105 MMOL/L (ref 96–112)
CO2 SERPL-SCNC: 26 MMOL/L (ref 20–33)
CREAT SERPL-MCNC: 0.85 MG/DL (ref 0.5–1.4)
EKG IMPRESSION: NORMAL
ERYTHROCYTE [DISTWIDTH] IN BLOOD BY AUTOMATED COUNT: 40.9 FL (ref 35.9–50)
GLOBULIN SER CALC-MCNC: 2.5 G/DL (ref 1.9–3.5)
GLUCOSE SERPL-MCNC: 122 MG/DL (ref 65–99)
HCT VFR BLD AUTO: 43.8 % (ref 42–52)
HGB BLD-MCNC: 15 G/DL (ref 14–18)
INR PPP: 0.99 (ref 0.87–1.13)
MCH RBC QN AUTO: 31.1 PG (ref 27–33)
MCHC RBC AUTO-ENTMCNC: 34.2 G/DL (ref 33.7–35.3)
MCV RBC AUTO: 90.7 FL (ref 81.4–97.8)
PLATELET # BLD AUTO: 215 K/UL (ref 164–446)
PMV BLD AUTO: 9.3 FL (ref 9–12.9)
POTASSIUM SERPL-SCNC: 4.1 MMOL/L (ref 3.6–5.5)
PROT SERPL-MCNC: 7.3 G/DL (ref 6–8.2)
PROTHROMBIN TIME: 13.3 SEC (ref 12–14.6)
RBC # BLD AUTO: 4.83 M/UL (ref 4.7–6.1)
SODIUM SERPL-SCNC: 140 MMOL/L (ref 135–145)
WBC # BLD AUTO: 6.1 K/UL (ref 4.8–10.8)

## 2019-06-03 PROCEDURE — 85610 PROTHROMBIN TIME: CPT

## 2019-06-03 PROCEDURE — 85027 COMPLETE CBC AUTOMATED: CPT

## 2019-06-03 PROCEDURE — 93005 ELECTROCARDIOGRAM TRACING: CPT

## 2019-06-03 PROCEDURE — 36415 COLL VENOUS BLD VENIPUNCTURE: CPT

## 2019-06-03 PROCEDURE — 93010 ELECTROCARDIOGRAM REPORT: CPT | Performed by: INTERNAL MEDICINE

## 2019-06-03 PROCEDURE — 80053 COMPREHEN METABOLIC PANEL: CPT

## 2019-06-03 PROCEDURE — 85730 THROMBOPLASTIN TIME PARTIAL: CPT

## 2019-06-03 RX ORDER — PRAVASTATIN SODIUM 80 MG/1
80 TABLET ORAL NIGHTLY
COMMUNITY
End: 2019-07-03 | Stop reason: SDUPTHER

## 2019-06-03 RX ORDER — OMEPRAZOLE 20 MG/1
20 CAPSULE, DELAYED RELEASE ORAL DAILY
COMMUNITY
End: 2020-07-17

## 2019-06-05 ENCOUNTER — HOSPITAL ENCOUNTER (OUTPATIENT)
Facility: MEDICAL CENTER | Age: 59
End: 2019-06-05
Attending: INTERNAL MEDICINE | Admitting: INTERNAL MEDICINE
Payer: COMMERCIAL

## 2019-06-05 ENCOUNTER — APPOINTMENT (OUTPATIENT)
Dept: CARDIOLOGY | Facility: MEDICAL CENTER | Age: 59
End: 2019-06-05
Attending: INTERNAL MEDICINE
Payer: COMMERCIAL

## 2019-06-05 VITALS
WEIGHT: 185.85 LBS | DIASTOLIC BLOOD PRESSURE: 65 MMHG | BODY MASS INDEX: 36.49 KG/M2 | TEMPERATURE: 97.4 F | HEART RATE: 58 BPM | HEIGHT: 60 IN | RESPIRATION RATE: 17 BRPM | OXYGEN SATURATION: 95 % | SYSTOLIC BLOOD PRESSURE: 140 MMHG

## 2019-06-05 DIAGNOSIS — R94.39 ABNORMAL STRESS TEST: ICD-10-CM

## 2019-06-05 PROCEDURE — 93458 L HRT ARTERY/VENTRICLE ANGIO: CPT

## 2019-06-05 PROCEDURE — 700111 HCHG RX REV CODE 636 W/ 250 OVERRIDE (IP)

## 2019-06-05 PROCEDURE — 700101 HCHG RX REV CODE 250

## 2019-06-05 PROCEDURE — 700117 HCHG RX CONTRAST REV CODE 255: Performed by: INTERNAL MEDICINE

## 2019-06-05 PROCEDURE — 160002 HCHG RECOVERY MINUTES (STAT)

## 2019-06-05 PROCEDURE — 700105 HCHG RX REV CODE 258: Performed by: INTERNAL MEDICINE

## 2019-06-05 PROCEDURE — 93458 L HRT ARTERY/VENTRICLE ANGIO: CPT | Mod: 26 | Performed by: INTERNAL MEDICINE

## 2019-06-05 PROCEDURE — 99152 MOD SED SAME PHYS/QHP 5/>YRS: CPT | Performed by: INTERNAL MEDICINE

## 2019-06-05 RX ORDER — LIDOCAINE HYDROCHLORIDE 20 MG/ML
INJECTION, SOLUTION INFILTRATION; PERINEURAL
Status: COMPLETED
Start: 2019-06-05 | End: 2019-06-05

## 2019-06-05 RX ORDER — VERAPAMIL HYDROCHLORIDE 2.5 MG/ML
INJECTION, SOLUTION INTRAVENOUS
Status: COMPLETED
Start: 2019-06-05 | End: 2019-06-05

## 2019-06-05 RX ORDER — MIDAZOLAM HYDROCHLORIDE 1 MG/ML
INJECTION INTRAMUSCULAR; INTRAVENOUS
Status: COMPLETED
Start: 2019-06-05 | End: 2019-06-05

## 2019-06-05 RX ORDER — SODIUM CHLORIDE 9 MG/ML
1000 INJECTION, SOLUTION INTRAVENOUS
Status: DISCONTINUED | OUTPATIENT
Start: 2019-06-05 | End: 2019-06-05 | Stop reason: HOSPADM

## 2019-06-05 RX ORDER — HEPARIN SODIUM,PORCINE 1000/ML
VIAL (ML) INJECTION
Status: COMPLETED
Start: 2019-06-05 | End: 2019-06-05

## 2019-06-05 RX ADMIN — VERAPAMIL HYDROCHLORIDE 2.5 MG: 2.5 INJECTION, SOLUTION INTRAVENOUS at 11:10

## 2019-06-05 RX ADMIN — IOHEXOL 55 ML: 350 INJECTION, SOLUTION INTRAVENOUS at 11:22

## 2019-06-05 RX ADMIN — HEPARIN SODIUM: 1000 INJECTION, SOLUTION INTRAVENOUS; SUBCUTANEOUS at 11:09

## 2019-06-05 RX ADMIN — HEPARIN SODIUM 2000 UNITS: 1000 INJECTION, SOLUTION INTRAVENOUS; SUBCUTANEOUS at 11:09

## 2019-06-05 RX ADMIN — NITROGLYCERIN 10 ML: 20 INJECTION INTRAVENOUS at 11:09

## 2019-06-05 RX ADMIN — FENTANYL CITRATE 100 MCG: 50 INJECTION INTRAMUSCULAR; INTRAVENOUS at 11:18

## 2019-06-05 RX ADMIN — MIDAZOLAM HYDROCHLORIDE 2 MG: 1 INJECTION, SOLUTION INTRAMUSCULAR; INTRAVENOUS at 11:18

## 2019-06-05 RX ADMIN — LIDOCAINE HYDROCHLORIDE: 20 INJECTION, SOLUTION INFILTRATION; PERINEURAL at 11:09

## 2019-06-05 RX ADMIN — SODIUM CHLORIDE 1000 ML: 9 INJECTION, SOLUTION INTRAVENOUS at 10:07

## 2019-06-05 NOTE — OR NURSING
1135   PT RECEIVED FROM CATH LAB,  S/P CLEAN HEART CATH VIA RIGHT WRIST.  TR BAND IS INTACT IN RIGHT WRIST.  PT IS A/A/OX4.  DENIES ANY PAIN. WIFE IS @ BEDSIDE.    1200   TR BAND INTACT.  PT TAKING PO FLUID AND SNACK OK.    1245   1ST 2CC OF AIR RELEASED FROM TR BAND.  SITE IS CLEAR.    1300   NEXT 3CC OF AIR RELEASED FROM TR BAND.  SITE IS CLEAR.    1315   NEXT 3CC OF AIR RELEASED FROM TR BAND.  SITE IS CLEAR.    1330   LAST 3CC OR AIR RELEASED FROM TR BAND.  SITE IS CLEAR.    1345   TR BAND REMOVED AND 2X2 WITH TEGADERM APPLIED.    1406   DC INSTRUCTIONS GIVEN TO PT AND WIFE.  VERBALIZED UNDERSTANDING OF ALL.  PT UP TO BATHROOM TO VOID.  HL DC.  PT DC TO HOME,  AMBULATORY,  REFUSED W/C, ESCORTED OUT BY WIFE,  REFUSED STAFF ESCORT.

## 2019-06-05 NOTE — DISCHARGE INSTRUCTIONS
ACTIVITY: Rest and take it easy for the first 24 hours.  A responsible adult is recommended to remain with you during that time.  It is normal to feel sleepy.  We encourage you to not do anything that requires balance, judgment or coordination.    MILD FLU-LIKE SYMPTOMS ARE NORMAL. YOU MAY EXPERIENCE GENERALIZED MUSCLE ACHES, THROAT IRRITATION, HEADACHE AND/OR SOME NAUSEA.    FOR 24 HOURS DO NOT:  Drive, operate machinery or run household appliances.  Drink beer or alcoholic beverages.   Make important decisions or sign legal documents.    SPECIAL INSTRUCTIONS: *KEEP INCISION DRY FOR 24 HRS**    DIET: To avoid nausea, slowly advance diet as tolerated, avoiding spicy or greasy foods for the first day.  Add more substantial food to your diet according to your physician's instructions.  Babies can be fed formula or breast milk as soon as they are hungry.  INCREASE FLUIDS AND FIBER TO AVOID CONSTIPATION.    SURGICAL DRESSING/BATHING: *MAY SHOWER TOMORROW AFTERNOON THEN MAY REMOVE DRESSING**    FOLLOW-UP APPOINTMENT:  A follow-up appointment should be arranged with your doctor in *[place]   [phone] **; call to schedule.    You should CALL YOUR PHYSICIAN if you develop:  Fever greater than 101 degrees F.  Pain not relieved by medication, or persistent nausea or vomiting.  Excessive bleeding (blood soaking through dressing) or unexpected drainage from the wound.  Extreme redness or swelling around the incision site, drainage of pus or foul smelling drainage.  Inability to urinate or empty your bladder within 8 hours.  Problems with breathing or chest pain.    You should call 911 if you develop problems with breathing or chest pain.  If you are unable to contact your doctor or surgical center, you should go to the nearest emergency room or urgent care center.  Physician's telephone #: *[phone]**    If any questions arise, call your doctor.  If your doctor is not available, please feel free to call the Surgical Center  at [phone].  The Center is open Monday through Friday from 7AM to 7PM.  You can also call the HEALTH HOTLINE open 24 hours/day, 7 days/week and speak to a nurse at [phone], or toll free at [phone].    A registered nurse may call you a few days after your surgery to see how you are doing after your procedure.    MEDICATIONS: Resume taking daily medication.  Take prescribed pain medication with food.  If no medication is prescribed, you may take non-aspirin pain medication if needed.  PAIN MEDICATION CAN BE VERY CONSTIPATING.  Take a stool softener or laxative such as senokot, pericolace, or milk of magnesia if needed.      If your physician has prescribed pain medication that includes Acetaminophen (Tylenol), do not take additional Acetaminophen (Tylenol) while taking the prescribed medication.    Depression / Suicide Risk    As you are discharged from this [facility] facility, it is important to learn how to keep safe from harming yourself.    Recognize the warning signs:  · Abrupt changes in personality, positive or negative- including increase in energy   · Giving away possessions  · Change in eating patterns- significant weight changes-  positive or negative  · Change in sleeping patterns- unable to sleep or sleeping all the time   · Unwillingness or inability to communicate  · Depression  · Unusual sadness, discouragement and loneliness  · Talk of wanting to die  · Neglect of personal appearance   · Rebelliousness- reckless behavior  · Withdrawal from people/activities they love  · Confusion- inability to concentrate     If you or a loved one observes any of these behaviors or has concerns about self-harm, here's what you can do:  · Talk about it- your feelings and reasons for harming yourself  · Remove any means that you might use to hurt yourself (examples: pills, rope, extension cords, firearm)  · Get professional help from the community (Mental Health, Substance Abuse, psychological  counseling)  · Do not be alone:Call your Safe Contact- someone whom you trust who will be there for you.  · Call your local CRISIS HOTLINE 964-3880 or 265-234-0538  · Call your local Children's Mobile Crisis Response Team [place] (497) 204-8545 or www.[employer]  · Call the toll free National Suicide Prevention Hotlines   · National Suicide Prevention Lifeline 010-140-OEZZ (5791)  [facility] 800-SUICIDE (961-1434)    Radial Catherization Discharge Instructions      · Do not subject hand/arm to any forceful movements for 24 hours    i.e. supporting weight when rising from the chair or bed.   · Do not drive a car for 24 hours  · You may remove the dressing tomorrow  · You may shower on the day following your procedure.  Do not take a tub bath or submerge the puncture site in water for 3 days following the procedure.  · No Lifting more than 3-5 pounds with affected wrist for 5 days  · Follow up with Dr. PAINTER**  2-4 weeks.  · Increase fluids for 2 days post procedure.  · Continue all previous medications unless otherwise instructed.    If bleeding should occur following discharge:  · Sit down and apply firm pressure to site with your fingers for 10 minutes  · If the bleeding stops, continue to sit quietly, keeping your wrist straight for 2 hours.  Notify physician as soon as possible ( [phone])  · If bleeding does not stop after 10 minutes, or if there is a large amount of bleeding or spurting, call 911 immediately.  Do not drive yourself to the hospital.

## 2019-06-05 NOTE — PROCEDURES
DATE OF SERVICE:  06/05/2019    REFERRING PHYSICIAN:  Jaiden Khan MD    PROCEDURES:    1.  Left heart catheterization.    2.  Coronary angiography.    3.  Left ventriculogram.    4.  Monitor conscious sedation.      PREPROCEDURE DIAGNOSES:    1.  Positive coronary calcification score.    2.  Abnormal myocardial perfusion study.      POSTPROCEDURE DIAGNOSES:    1.  Nonobstructive coronary artery disease.    2.  Normal left ventricular systolic function with ejection fraction of 65%.    3.  Elevated left ventricular end diastolic pressure.      INDICATION:  The patient is a 58-year-old male who underwent a coronary   calcification score which was abnormal.  This was followed with a nuclear   stress test, which was also abnormal.  He was scheduled for cardiac   catheterization.      DESCRIPTION OF PROCEDURE:  After informed consent was signed by the patient,   the patient was brought to the cardiac catheterization laboratory.  He was   prepped and draped in the usual sterile manner.  The right wrist area was   anesthetized with 2% Xylocaine.  A 6-[place] sheath was inserted into the right   radial artery using the modified Seldinger technique.  Intra-arterial   verapamil and nitroglycerin were given.  IV heparin was given.  A 4-[place]   pigtail catheter was positioned into the left ventricle.  Left angiography was   performed.  This was exchanged for a 4-[place] JL 3.5 catheter, which was   positioned into left main coronary artery.  Coronary angiography was   performed.  This was exchanged for a 4-[place] JR4 catheter, which was   positioned in the right coronary artery.  Coronary angiography was performed.    The patient tolerated the procedure well.  At the end of the procedure, all   catheters and sheaths were removed.  Hemoband was placed on the right wrist.    He was transferred to PPU in stable condition.      HEMODYNAMIC DATA:  Hemodynamic data shows aortic pressures of 150/70 with mean   of 100 mmHg and  /0 with LVEDP of 25 mmHg.      AORTIC VALVE:  There is no significant gradient noted.      LEFT VENTRICULOGRAM:  A 10 mL of contrast was delivered for 3 seconds.    Ejection fraction was estimated to be 65%.  No segmental wall motion   abnormalities noted.      ANGIOGRAM:    LEFT MAIN CORONARY ARTERY:  Left main coronary artery is a short, large   caliber vessel free of disease.    LEFT ANTERIOR DESCENDING ARTERY:  Left anterior descending artery is a long,   large caliber vessel which wraps around the apex.  There are minimal   calcification and concentric 10-20% stenosis of the proximal to mid portion.    There is a moderate caliber diagonal branch noted free of disease.    RAMUS INTERMEDIUS:  Ramus intermedius is a long, moderate to large caliber   vessel free of disease.  LEFT CIRCUMFLEX ARTERY:  Left circumflex artery is a nondominant moderate   caliber vessel with small first obtuse marginal branch, large bifurcating   second obtuse marginal branch and short, small distal obtuse marginal branch   following the AV groove.  The circumflex artery and its branches are free of   disease.    RIGHT CORONARY ARTERY:  Right coronary artery is a dominant, large caliber   vessel with proximal to mid mild to moderate calcification with diffuse   concentric 30-40% stenosis.  Distally, there is a moderate caliber posterior   descending artery branch with ostial concentric 30% stenosis.  There is a   small posterolateral branch and long large second posterolateral branch, third   small posterolateral branch, all noted to be free of disease.      IMPRESSION:    1.  Nonobstructive coronary artery disease.    2.  Normal left ventricular systolic function with ejection fraction of 65%.    3.  Elevated left ventricular end diastolic pressure.      RECOMMENDATIONS:  Recommend medical therapy.     SEDATION TIME: The patient's sedation was managed by myself with continuous   face to face time with the patient for 15 minutes  from 11:05 to 11:20.          ____________________________________     MD ROSCOE ALMONTE / JAMES    DD:  06/05/2019 11:27:48  DT:  06/05/2019 12:07:24    D#:  [id number]  Job#:  [id number]

## 2019-07-03 ENCOUNTER — OFFICE VISIT (OUTPATIENT)
Dept: CARDIOLOGY | Facility: MEDICAL CENTER | Age: 59
End: 2019-07-03
Payer: COMMERCIAL

## 2019-07-03 VITALS
HEIGHT: 60 IN | DIASTOLIC BLOOD PRESSURE: 60 MMHG | HEART RATE: 66 BPM | OXYGEN SATURATION: 95 % | BODY MASS INDEX: 37.3 KG/M2 | WEIGHT: 190 LBS | SYSTOLIC BLOOD PRESSURE: 120 MMHG

## 2019-07-03 DIAGNOSIS — E78.5 DYSLIPIDEMIA: ICD-10-CM

## 2019-07-03 DIAGNOSIS — I25.84 CORONARY ARTERY CALCIFICATION: ICD-10-CM

## 2019-07-03 DIAGNOSIS — E88.810 METABOLIC SYNDROME: ICD-10-CM

## 2019-07-03 DIAGNOSIS — E55.9 VITAMIN D DEFICIENCY: ICD-10-CM

## 2019-07-03 DIAGNOSIS — R94.39 ABNORMAL STRESS TEST: ICD-10-CM

## 2019-07-03 DIAGNOSIS — Z98.890 S/P CARDIAC CATH: ICD-10-CM

## 2019-07-03 DIAGNOSIS — I10 ESSENTIAL HYPERTENSION: ICD-10-CM

## 2019-07-03 DIAGNOSIS — I25.10 CORONARY ARTERY CALCIFICATION: ICD-10-CM

## 2019-07-03 PROCEDURE — 99214 OFFICE O/P EST MOD 30 MIN: CPT | Performed by: NURSE PRACTITIONER

## 2019-07-03 RX ORDER — ASPIRIN 81 MG/1
81 TABLET, CHEWABLE ORAL DAILY
Qty: 100 TAB | Refills: 3 | COMMUNITY
Start: 2019-07-03

## 2019-07-03 RX ORDER — PRAVASTATIN SODIUM 80 MG/1
80 TABLET ORAL EVERY EVENING
Qty: 100 TAB | Refills: 3 | Status: SHIPPED | OUTPATIENT
Start: 2019-07-03 | End: 2019-08-08

## 2019-07-03 ASSESSMENT — ENCOUNTER SYMPTOMS
ORTHOPNEA: 0
SHORTNESS OF BREATH: 0
ABDOMINAL PAIN: 0
DIZZINESS: 0
PALPITATIONS: 0
COUGH: 0
CLAUDICATION: 0
PND: 0
WEAKNESS: 0
MYALGIAS: 0

## 2019-07-03 NOTE — PROGRESS NOTES
"Chief Complaint   Patient presents with   • HTN (Controlled)     PP       Subjective:   Víctor  \"Art\" Jameel Hollis is a 59 y.o. male who presents today to follow-up on coronary calcification and cardiac catheterization.    He did an executive physical and had a abnormal stress test.  He was evaluated by Dr Khan who felt the patient needed a cardiac catheterization since his coronary calcium score was 1300.  The patient underwent cardiac catheterization on June 5, 2019 which showed nonobstructive disease.  He has been asymptomatic.    He exercises regularly doing running or elliptical but had a period of several months that he was off exercise.  He has returned to exercising at least 5 days a week.  He denies any chest tightness, heaviness or pressure.  No shortness of breath or ankle edema states he feels very well.  He has changed his diet since he is metabolic syndrome.      Past Medical History:   Diagnosis Date   • Allergy    • GERD (gastroesophageal reflux disease)    • Hepatitis 2006    patient denies; never been told; not sure where it came from   • Hiatal hernia    • High cholesterol    • Hypertension      Past Surgical History:   Procedure Laterality Date   • [facility] CARDIAC CATH  06/05/2019    non obstructive disease.   • TENDON REPAIR Right 2008     Family History   Problem Relation Age of Onset   • Hypertension Mother    • Diabetes Father    • Hypertension Sister    • Hypertension Brother    • Diabetes Brother    • Hypertension Son      Social History     Social History   • Marital status:      Spouse name: N/A   • Number of children: N/A   • Years of education: N/A     Occupational History   • Not on file.     Social History Main Topics   • Smoking status: Former Smoker     Packs/day: 0.25     Years: 3.00     Types: Cigarettes     Start date: 1980     Quit date: 1983   • Smokeless tobacco: Never Used   • Alcohol use Yes      Comment: 2-3 drinks/day    • Drug use: No   • Sexual activity: Not on " "file     Other Topics Concern   • Not on file     Social History Narrative   • No narrative on file     Allergies   Allergen Reactions   • Other Food Itching     Shell fish; Pt states \"at times skin rash and/or puffy/itchy eyes\"     Outpatient Encounter Prescriptions as of 7/3/2019   Medication Sig Dispense Refill   • Cholecalciferol (VITAMIN D-3) 5000 units Tab Take  by mouth.     • pravastatin (PRAVACHOL) 80 MG tablet Take 1 Tab by mouth every evening. 100 Tab 3   • aspirin (ASA) 81 MG Chew Tab chewable tablet Take 1 Tab by mouth every day. 100 Tab 3   • omeprazole (PRILOSEC) 20 MG delayed-release capsule Take 20 mg by mouth every day.     • metoprolol (LOPRESSOR) 25 MG Tab Take 1 Tab by mouth 2 times a day. 60 Tab 11   • amlodipine-benazepril (LOTREL) 10-20 MG per capsule Take 1 Cap by mouth every day.     • therapeutic multivitamin-minerals (THERAGRAN-M) Tab Take 1 Tab by mouth every day.     • Probiotic Product (PROBIOTIC FORMULA PO) Take  by mouth.     • [DISCONTINUED] pravastatin (PRAVACHOL) 80 MG tablet Take 80 mg by mouth every evening.     • [DISCONTINUED] Omega-3 Fatty Acids (FISH OIL ADULT GUMMIES PO) Take 1 Tab by mouth every day.     • Fexofenadine HCl (ALLEGRA ALLERGY PO) Take  by mouth.       No facility-administered encounter medications on file as of 7/3/2019.      Review of Systems   Constitutional: Negative for malaise/fatigue.   Respiratory: Negative for cough and shortness of breath.    Cardiovascular: Negative for chest pain, palpitations, orthopnea, claudication, leg swelling and PND.   Gastrointestinal: Negative for abdominal pain.   Musculoskeletal: Negative for myalgias.   Neurological: Negative for dizziness and weakness.        Objective:   /60 (BP Location: Left arm, Patient Position: Sitting, BP Cuff Size: Adult)   Pulse 66   Ht 1.397 m (4' 7\")   Wt 86.2 kg (190 lb)   SpO2 95%   BMI 44.16 kg/m²     Physical Exam   Constitutional: He is oriented to person, place, and time. He " appears well-developed and well-nourished.   HENT:   Head: Normocephalic.   Eyes: EOM are normal.   Neck: Normal range of motion. No JVD present.   Cardiovascular: Normal rate and regular rhythm.  Exam reveals no friction rub.    No murmur heard.  Pulmonary/Chest: Effort normal.   Abdominal: Soft. Bowel sounds are normal.   Musculoskeletal: He exhibits no edema.   Neurological: He is alert and oriented to person, place, and time.   Skin: Skin is warm and dry.   Psychiatric: He has a normal mood and affect.     Results for GRACIA BAZAN (MRN [id number])    Ref. Range 5/2/2019 08:00   Cholesterol,Tot Latest Ref Range: <=199 mg/dL 217 (H)   Triglycerides Latest Ref Range: 30 - 149 mg/dL 356 (H)   HDL Latest Ref Range: 40 - 59 mg/dL 46   EER LipoFit by NMR Unknown See Note   LDL Cholesterol Latest Ref Range: <=129 mg/dL 100   HDL Particle No. Latest Ref Range: >=33.0 umol/L >41.0   Small LDL Latest Ref Range: <=634 nmol/L >1085 (H)   L-HDL Particle No. Latest Ref Range: >=4.2 umol/L <2.8 (L)   VLDL Size Latest Ref Range: <=46.7 nm 57.5 (H)   L-VLDL Particle No. Latest Ref Range: <=2.7 nmol/L 12.6 (H)   HDL Size Latest Ref Range: >=8.9 nm 8.1 (L)   LDL Particle Size Latest Ref Range: >=20.7 nm 19.7 (L)   LDL Particle Latest Ref Range: <=1135 nmol/L 1800 (H)   Apolipoprotein-B Latest Ref Range: 55 - 140 mg/dL 121   Lipoprotein A Latest Ref Range: <=29 mg/dL 6   Results for GRACIA BAZAN (MRN [id number])    Ref. Range 6/3/2019 08:37   Sodium Latest Ref Range: 135 - 145 mmol/L 140   Potassium Latest Ref Range: 3.6 - 5.5 mmol/L 4.1   Chloride Latest Ref Range: 96 - 112 mmol/L 105   Co2 Latest Ref Range: 20 - 33 mmol/L 26   Anion Gap Latest Ref Range: 0.0 - 11.9  9.0   Glucose Latest Ref Range: 65 - 99 mg/dL 122 (H)   Bun Latest Ref Range: 8 - 22 mg/dL 13   Creatinine Latest Ref Range: 0.50 - 1.40 mg/dL 0.85   GFR If  Latest Ref Range: >60 mL/min/1.73 m 2 >60   GFR If Non  Latest Ref  Range: >60 mL/min/1.73 m 2 >60   Calcium Latest Ref Range: 8.5 - 10.5 mg/dL 9.3   AST(SGOT) Latest Ref Range: 12 - 45 U/L 24   ALT(SGPT) Latest Ref Range: 2 - 50 U/L 32   Alkaline Phosphatase Latest Ref Range: 30 - 99 U/L 68   Total Bilirubin Latest Ref Range: 0.1 - 1.5 mg/dL 0.8   Albumin Latest Ref Range: 3.2 - 4.9 g/dL 4.8   Total Protein Latest Ref Range: 6.0 - 8.2 g/dL 7.3   Globulin Latest Ref Range: 1.9 - 3.5 g/dL 2.5   A-G Ratio Latest Units: g/dL 1.9       May 2, 2019: Vitamin D level 9.        May 2, 2018: Coronary calcification:  LMA - 0.0  LCX - 41.5  LAD - 395.0  RCA - 928.5  PDA - 0.0  Total Calcium Score: 1365    Assessment:     1. Abnormal stress test     2. Coronary artery calcification  aspirin (ASA) 81 MG Chew Tab chewable tablet   3. S/P cardiac cath     4. Dyslipidemia  LIPID PANEL    Comp Metabolic Panel    pravastatin (PRAVACHOL) 80 MG tablet   5. Essential hypertension     6. Metabolic syndrome     7. Vitamin D deficiency         Medical Decision Making:  Today's Assessment / Status / Plan:     Abnormal stress test: Patient underwent cardiac catheterization since his stress test was abnormal.  He had no obstructive disease but is considered to have coronary artery disease since he has a high calcification score.  We will treat him with guideline directed treatment including aspirin 81 mg for platelet inhibition.    If he develops any exertional anginal symptoms he will seek medical care.  Use of the EMS system is reviewed.    Dyslipidemia: Pravastatin has been increased to 80 mg.  It is a lower potency statin therefore if his LDL is not to goal I would recommend atorvastatin or rosuvastatin.  His LDL goal is 70 or less, preferably 40 or 50 since he has known coronary artery disease even though it is nonobstructive.  He will check labs with his primary care, Dr. Mercado in August.    Hypertension: His blood pressure is excellent.  Metoprolol was added since he has coronary disease.  If his blood  pressure becomes too low I would recommend adjusting down the dosage of his Lotrel.  His blood pressure goal is 130 systolic or less.    Metabolic syndrome: His blood sugars have been elevated in the past.  He also has elevated triglycerides on previous lab work.  He has returned to exercise and has adjusted his diet.  He is encouraged to keep up his healthy lifestyle.    Vitamin D deficiency: His vitamin D is extremely low at 9.  Usually patients will not replete their vitamin D and over-the-counter supplementation.  Usual treatment for vitamin D level less than 20 is 50,000 units a week for 6 to 12 weeks for repletion followed by maintenance therapy of 1000 to 2000 units daily.  I will leave it up to his primary care, Dr. Mady Mercado to manage his vitamin D.    He will follow-up in 1 year to establish with , sooner if problems particularly if chest discomfort.  He will follow-up with his primary care, Dr. Mady Mercado for his lipids, blood pressure and vitamin D.    Collaborating Provider: Dr. Mira Zabala.    Please note that this dictation was created using voice recognition software. I have made every reasonable attempt to correct obvious errors, but it is possible there are errors of grammar and possibly content that I did not discover before finalizing the note.

## 2019-07-03 NOTE — LETTER
"     [facility]   [address], [address]  [place] [address]  Phone: [phone]  Fax: [phone]              Víctor Hollis  1960    Encounter Date: 7/3/2019    APOLONIA Dumont          PROGRESS NOTE:  Chief Complaint   Patient presents with   • HTN (Controlled)     PP       Subjective:   Víctor  \"Art\" Jameel Hollis is a 59 y.o. male who presents today to follow-up on coronary calcification and cardiac catheterization.    He did an executive physical and had a abnormal stress test.  He was evaluated by Dr Khan who felt the patient needed a cardiac catheterization since his coronary calcium score was 1300.  The patient underwent cardiac catheterization on June 5, 2019 which showed nonobstructive disease.  He has been asymptomatic.    He exercises regularly doing running or elliptical but had a period of several months that he was off exercise.  He has returned to exercising at least 5 days a week.  He denies any chest tightness, heaviness or pressure.  No shortness of breath or ankle edema states he feels very well.  He has changed his diet since he is metabolic syndrome.      Past Medical History:   Diagnosis Date   • Allergy    • GERD (gastroesophageal reflux disease)    • Hepatitis 2006    patient denies; never been told; not sure where it came from   • Hiatal hernia    • High cholesterol    • Hypertension      Past Surgical History:   Procedure Laterality Date   • [facility] CARDIAC CATH  06/05/2019    non obstructive disease.   • TENDON REPAIR Right 2008     Family History   Problem Relation Age of Onset   • Hypertension Mother    • Diabetes Father    • Hypertension Sister    • Hypertension Brother    • Diabetes Brother    • Hypertension Son      Social History     Social History   • Marital status:      Spouse name: N/A   • Number of children: N/A   • Years of education: N/A     Occupational History   • Not on file.     Social History Main " "Topics   • Smoking status: Former Smoker     Packs/day: 0.25     Years: 3.00     Types: Cigarettes     Start date: 1980     Quit date: 1983   • Smokeless tobacco: Never Used   • Alcohol use Yes      Comment: 2-3 drinks/day    • Drug use: No   • Sexual activity: Not on file     Other Topics Concern   • Not on file     Social History Narrative   • No narrative on file     Allergies   Allergen Reactions   • Other Food Itching     Shell fish; Pt states \"at times skin rash and/or puffy/itchy eyes\"     Outpatient Encounter Prescriptions as of 7/3/2019   Medication Sig Dispense Refill   • Cholecalciferol (VITAMIN D-3) 5000 units Tab Take  by mouth.     • pravastatin (PRAVACHOL) 80 MG tablet Take 1 Tab by mouth every evening. 100 Tab 3   • aspirin (ASA) 81 MG Chew Tab chewable tablet Take 1 Tab by mouth every day. 100 Tab 3   • omeprazole (PRILOSEC) 20 MG delayed-release capsule Take 20 mg by mouth every day.     • metoprolol (LOPRESSOR) 25 MG Tab Take 1 Tab by mouth 2 times a day. 60 Tab 11   • amlodipine-benazepril (LOTREL) 10-20 MG per capsule Take 1 Cap by mouth every day.     • therapeutic multivitamin-minerals (THERAGRAN-M) Tab Take 1 Tab by mouth every day.     • Probiotic Product (PROBIOTIC FORMULA PO) Take  by mouth.     • [DISCONTINUED] pravastatin (PRAVACHOL) 80 MG tablet Take 80 mg by mouth every evening.     • [DISCONTINUED] Omega-3 Fatty Acids (FISH OIL ADULT GUMMIES PO) Take 1 Tab by mouth every day.     • Fexofenadine HCl (ALLEGRA ALLERGY PO) Take  by mouth.       No facility-administered encounter medications on file as of 7/3/2019.      Review of Systems   Constitutional: Negative for malaise/fatigue.   Respiratory: Negative for cough and shortness of breath.    Cardiovascular: Negative for chest pain, palpitations, orthopnea, claudication, leg swelling and PND.   Gastrointestinal: Negative for abdominal pain.   Musculoskeletal: Negative for myalgias.   Neurological: Negative for dizziness and weakness.    " "       Objective:   /60 (BP Location: Left arm, Patient Position: Sitting, BP Cuff Size: Adult)   Pulse 66   Ht 1.397 m (4' 7\")   Wt 86.2 kg (190 lb)   SpO2 95%   BMI 44.16 kg/m²      Physical Exam   Constitutional: He is oriented to person, place, and time. He appears well-developed and well-nourished.   HENT:   Head: Normocephalic.   Eyes: EOM are normal.   Neck: Normal range of motion. No JVD present.   Cardiovascular: Normal rate and regular rhythm.  Exam reveals no friction rub.    No murmur heard.  Pulmonary/Chest: Effort normal.   Abdominal: Soft. Bowel sounds are normal.   Musculoskeletal: He exhibits no edema.   Neurological: He is alert and oriented to person, place, and time.   Skin: Skin is warm and dry.   Psychiatric: He has a normal mood and affect.     Results for GRACIA BAZAN (MRN [id number])    Ref. Range 5/2/2019 08:00   Cholesterol,Tot Latest Ref Range: <=199 mg/dL 217 (H)   Triglycerides Latest Ref Range: 30 - 149 mg/dL 356 (H)   HDL Latest Ref Range: 40 - 59 mg/dL 46   EER LipoFit by NMR Unknown See Note   LDL Cholesterol Latest Ref Range: <=129 mg/dL 100   HDL Particle No. Latest Ref Range: >=33.0 umol/L >41.0   Small LDL Latest Ref Range: <=634 nmol/L >1085 (H)   L-HDL Particle No. Latest Ref Range: >=4.2 umol/L <2.8 (L)   VLDL Size Latest Ref Range: <=46.7 nm 57.5 (H)   L-VLDL Particle No. Latest Ref Range: <=2.7 nmol/L 12.6 (H)   HDL Size Latest Ref Range: >=8.9 nm 8.1 (L)   LDL Particle Size Latest Ref Range: >=20.7 nm 19.7 (L)   LDL Particle Latest Ref Range: <=1135 nmol/L 1800 (H)   Apolipoprotein-B Latest Ref Range: 55 - 140 mg/dL 121   Lipoprotein A Latest Ref Range: <=29 mg/dL 6   Results for GRACIA BAZAN (MRN [id number])    Ref. Range 6/3/2019 08:37   Sodium Latest Ref Range: 135 - 145 mmol/L 140   Potassium Latest Ref Range: 3.6 - 5.5 mmol/L 4.1   Chloride Latest Ref Range: 96 - 112 mmol/L 105   Co2 Latest Ref Range: 20 - 33 mmol/L 26   Anion Gap Latest Ref " Range: 0.0 - 11.9  9.0   Glucose Latest Ref Range: 65 - 99 mg/dL 122 (H)   Bun Latest Ref Range: 8 - 22 mg/dL 13   Creatinine Latest Ref Range: 0.50 - 1.40 mg/dL 0.85   GFR If  Latest Ref Range: >60 mL/min/1.73 m 2 >60   GFR If Non  Latest Ref Range: >60 mL/min/1.73 m 2 >60   Calcium Latest Ref Range: 8.5 - 10.5 mg/dL 9.3   AST(SGOT) Latest Ref Range: 12 - 45 U/L 24   ALT(SGPT) Latest Ref Range: 2 - 50 U/L 32   Alkaline Phosphatase Latest Ref Range: 30 - 99 U/L 68   Total Bilirubin Latest Ref Range: 0.1 - 1.5 mg/dL 0.8   Albumin Latest Ref Range: 3.2 - 4.9 g/dL 4.8   Total Protein Latest Ref Range: 6.0 - 8.2 g/dL 7.3   Globulin Latest Ref Range: 1.9 - 3.5 g/dL 2.5   A-G Ratio Latest Units: g/dL 1.9       May 2, 2019: Vitamin D level 9.        May 2, 2018: Coronary calcification:  LMA - 0.0  LCX - 41.5  LAD - 395.0  RCA - 928.5  PDA - 0.0  Total Calcium Score: 1365    Assessment:     1. Abnormal stress test     2. Coronary artery calcification  aspirin (ASA) 81 MG Chew Tab chewable tablet   3. S/P cardiac cath     4. Dyslipidemia  LIPID PANEL    Comp Metabolic Panel    pravastatin (PRAVACHOL) 80 MG tablet   5. Essential hypertension     6. Metabolic syndrome     7. Vitamin D deficiency         Medical Decision Making:  Today's Assessment / Status / Plan:     Abnormal stress test: Patient underwent cardiac catheterization since his stress test was abnormal.  He had no obstructive disease but is considered to have coronary artery disease since he has a high calcification score.  We will treat him with guideline directed treatment including aspirin 81 mg for platelet inhibition.    If he develops any exertional anginal symptoms he will seek medical care.  Use of the EMS system is reviewed.    Dyslipidemia: Pravastatin has been increased to 80 mg.  It is a lower potency statin therefore if his LDL is not to goal I would recommend atorvastatin or rosuvastatin.  His LDL goal is 70 or less,  preferably 40 or 50 since he has known coronary artery disease even though it is nonobstructive.  He will check labs with his primary care, Dr. Mercado in August.    Hypertension: His blood pressure is excellent.  Metoprolol was added since he has coronary disease.  If his blood pressure becomes too low I would recommend adjusting down the dosage of his Lotrel.  His blood pressure goal is 130 systolic or less.    Metabolic syndrome: His blood sugars have been elevated in the past.  He also has elevated triglycerides on previous lab work.  He has returned to exercise and has adjusted his diet.  He is encouraged to keep up his healthy lifestyle.    Vitamin D deficiency: His vitamin D is extremely low at 9.  Usually patients will not replete their vitamin D and over-the-counter supplementation.  Usual treatment for vitamin D level less than 20 is 50,000 units a week for 6 to 12 weeks for repletion followed by maintenance therapy of 1000 to 2000 units daily.  I will leave it up to his primary care, Dr. Mady Mercado to manage his vitamin D.    He will follow-up in 1 year to establish with , sooner if problems particularly if chest discomfort.  He will follow-up with his primary care, Dr. Mady Mercado for his lipids, blood pressure and vitamin D.    Collaborating Provider: Dr. Mira Zabala.    Please note that this dictation was created using voice recognition software. I have made every reasonable attempt to correct obvious errors, but it is possible there are errors of grammar and possibly content that I did not discover before finalizing the note.          No Recipients

## 2019-07-15 DIAGNOSIS — I10 ESSENTIAL HYPERTENSION: Primary | ICD-10-CM

## 2019-08-08 ENCOUNTER — TELEPHONE (OUTPATIENT)
Dept: CARDIOLOGY | Facility: MEDICAL CENTER | Age: 59
End: 2019-08-08

## 2019-08-08 DIAGNOSIS — E78.5 DYSLIPIDEMIA: ICD-10-CM

## 2019-08-08 LAB
ALBUMIN SERPL-MCNC: 4.8 G/DL (ref 3.6–4.8)
ALBUMIN/GLOB SERPL: 1.8 {RATIO} (ref 1.2–2.2)
ALP SERPL-CCNC: 83 IU/L (ref 39–117)
ALT SERPL-CCNC: 41 IU/L (ref 0–44)
AST SERPL-CCNC: 39 IU/L (ref 0–40)
BILIRUB SERPL-MCNC: 0.5 MG/DL (ref 0–1.2)
BUN SERPL-MCNC: 12 MG/DL (ref 8–27)
BUN/CREAT SERPL: 13 (ref 10–24)
CALCIUM SERPL-MCNC: 9.9 MG/DL (ref 8.6–10.2)
CHLORIDE SERPL-SCNC: 103 MMOL/L (ref 96–106)
CHOLEST SERPL-MCNC: 177 MG/DL (ref 100–199)
CO2 SERPL-SCNC: 24 MMOL/L (ref 20–29)
CREAT SERPL-MCNC: 0.89 MG/DL (ref 0.76–1.27)
GLOBULIN SER CALC-MCNC: 2.7 G/DL (ref 1.5–4.5)
GLUCOSE SERPL-MCNC: 103 MG/DL (ref 65–99)
HDLC SERPL-MCNC: 43 MG/DL
LABORATORY COMMENT REPORT: ABNORMAL
LDLC SERPL CALC-MCNC: 76 MG/DL (ref 0–99)
POTASSIUM SERPL-SCNC: 4.4 MMOL/L (ref 3.5–5.2)
PROT SERPL-MCNC: 7.5 G/DL (ref 6–8.5)
SODIUM SERPL-SCNC: 140 MMOL/L (ref 134–144)
TRIGL SERPL-MCNC: 292 MG/DL (ref 0–149)
VLDLC SERPL CALC-MCNC: 58 MG/DL (ref 5–40)

## 2019-08-08 RX ORDER — ATORVASTATIN CALCIUM 80 MG/1
80 TABLET, FILM COATED ORAL EVERY EVENING
Qty: 30 TAB | Refills: 0 | Status: SHIPPED | OUTPATIENT
Start: 2019-08-08 | End: 2019-08-08 | Stop reason: SDUPTHER

## 2019-08-08 RX ORDER — ATORVASTATIN CALCIUM 80 MG/1
80 TABLET, FILM COATED ORAL EVERY EVENING
Qty: 90 TAB | Refills: 3 | Status: SHIPPED | OUTPATIENT
Start: 2019-08-08 | End: 2020-04-22 | Stop reason: SDUPTHER

## 2019-08-08 NOTE — TELEPHONE ENCOUNTER
Associated Results   Result Notes for Comp Metabolic Panel   Notes recorded by APOLONIA Dumont on 8/8/2019 at 10:20 AM PDT    Please contact patient.  His LDL 76 which is slightly higher than we would like it.  Change to atorvastatin 80 mg.  Let him know that this is a more potent statin and will bring his LDL down further.  Also his triglycerides are extremely elevated.  He needs to reduce sweets and carbs in his diet.  Check lipids in 3 months.     Called patient, spoke to pt wife, Jacquelin, and reviewed APN recommendations.  Preferred pharmacy clarified.  She is requesting short script to be sent to [facility] and continued script to be sent to [facility].  Reassurance given that medication will be sent to preferred pharmacies.  She is also requesting for Lab slip to be mailed to mailing address.  She verbalizes understanding and would like to proceed with plan of care.  She states no other concerns or questions at this time and is appreciative of information given.      Atorvastatin ordered and sent to preferred pharmacies.    Lipid ordered and lab slip mailed to pt mailing address.

## 2019-08-14 ENCOUNTER — TELEPHONE (OUTPATIENT)
Dept: CARDIOLOGY | Facility: MEDICAL CENTER | Age: 59
End: 2019-08-14

## 2019-08-14 NOTE — TELEPHONE ENCOUNTER
Received [facility] from patient.      Replied to pt via [employer].    ----------------  Prescription Question   You  Víctor Hollis Just now (11:37 AM)     David Renee,     When you take your medication do you take it on an empty stomach?   If you are taking it on an empty stomach, try taking it with food.     She is recommending for you to be on 80mg Atorvastatin because your goal LDL is 40-50 and your last lab work showed your LDL at 76.     Please have your labs done 3 months after taking your Atorvastatin.     Thank you,   Dinah ARECHIGA for Dr. Victoria      This [employer] message has not been read.     Víctor Hollis Sharmisa C, A.P.NAdriana 2 hours ago (9:19 AM)      Hi. Last week you changed my pravastatin prescription to atrovastatin (80mg).  This upsets my stomach.  Can I cut the pills into quarters?     Do I really need to be on 80mg?     Thank you

## 2019-10-10 ENCOUNTER — APPOINTMENT (RX ONLY)
Dept: URBAN - METROPOLITAN AREA CLINIC 4 | Facility: CLINIC | Age: 59
Setting detail: DERMATOLOGY
End: 2019-10-10

## 2019-10-10 DIAGNOSIS — Z71.89 OTHER SPECIFIED COUNSELING: ICD-10-CM

## 2019-10-10 DIAGNOSIS — L82.1 OTHER SEBORRHEIC KERATOSIS: ICD-10-CM

## 2019-10-10 DIAGNOSIS — D22 MELANOCYTIC NEVI: ICD-10-CM

## 2019-10-10 DIAGNOSIS — D18.0 HEMANGIOMA: ICD-10-CM

## 2019-10-10 DIAGNOSIS — L81.4 OTHER MELANIN HYPERPIGMENTATION: ICD-10-CM

## 2019-10-10 PROBLEM — D22.5 MELANOCYTIC NEVI OF TRUNK: Status: ACTIVE | Noted: 2019-10-10

## 2019-10-10 PROBLEM — D48.5 NEOPLASM OF UNCERTAIN BEHAVIOR OF SKIN: Status: ACTIVE | Noted: 2019-10-10

## 2019-10-10 PROBLEM — D22.62 MELANOCYTIC NEVI OF LEFT UPPER LIMB, INCLUDING SHOULDER: Status: ACTIVE | Noted: 2019-10-10

## 2019-10-10 PROBLEM — D22.72 MELANOCYTIC NEVI OF LEFT LOWER LIMB, INCLUDING HIP: Status: ACTIVE | Noted: 2019-10-10

## 2019-10-10 PROBLEM — D22.71 MELANOCYTIC NEVI OF RIGHT LOWER LIMB, INCLUDING HIP: Status: ACTIVE | Noted: 2019-10-10

## 2019-10-10 PROBLEM — D22.39 MELANOCYTIC NEVI OF OTHER PARTS OF FACE: Status: ACTIVE | Noted: 2019-10-10

## 2019-10-10 PROBLEM — D18.01 HEMANGIOMA OF SKIN AND SUBCUTANEOUS TISSUE: Status: ACTIVE | Noted: 2019-10-10

## 2019-10-10 PROBLEM — D22.61 MELANOCYTIC NEVI OF RIGHT UPPER LIMB, INCLUDING SHOULDER: Status: ACTIVE | Noted: 2019-10-10

## 2019-10-10 PROCEDURE — 11102 TANGNTL BX SKIN SINGLE LES: CPT

## 2019-10-10 PROCEDURE — 99203 OFFICE O/P NEW LOW 30 MIN: CPT | Mod: 25

## 2019-10-10 PROCEDURE — ? COUNSELING

## 2019-10-10 PROCEDURE — ? BIOPSY BY SHAVE METHOD

## 2019-10-10 ASSESSMENT — LOCATION ZONE DERM
LOCATION ZONE: TRUNK
LOCATION ZONE: NECK
LOCATION ZONE: ARM
LOCATION ZONE: LIP
LOCATION ZONE: LEG
LOCATION ZONE: FACE

## 2019-10-10 ASSESSMENT — LOCATION DETAILED DESCRIPTION DERM
LOCATION DETAILED: LEFT LATERAL EYEBROW
LOCATION DETAILED: LEFT PROXIMAL CALF
LOCATION DETAILED: LEFT INFERIOR CENTRAL MALAR CHEEK
LOCATION DETAILED: RIGHT MEDIAL TRAPEZIAL NECK
LOCATION DETAILED: LEFT POSTERIOR SHOULDER
LOCATION DETAILED: RIGHT LATERAL SUPERIOR CHEST
LOCATION DETAILED: LEFT PROXIMAL DORSAL FOREARM
LOCATION DETAILED: RIGHT VENTRAL DISTAL FOREARM
LOCATION DETAILED: RIGHT DISTAL POSTERIOR UPPER ARM
LOCATION DETAILED: LEFT VENTRAL DISTAL FOREARM
LOCATION DETAILED: LEFT SUPERIOR LATERAL UPPER BACK
LOCATION DETAILED: LEFT MEDIAL SUPERIOR CHEST
LOCATION DETAILED: RIGHT PROXIMAL CALF
LOCATION DETAILED: RIGHT VENTRAL PROXIMAL FOREARM
LOCATION DETAILED: RIGHT DISTAL POSTERIOR THIGH
LOCATION DETAILED: LEFT DISTAL POSTERIOR THIGH
LOCATION DETAILED: LEFT UPPER CUTANEOUS LIP
LOCATION DETAILED: RIGHT PROXIMAL DORSAL FOREARM
LOCATION DETAILED: RIGHT POSTERIOR SHOULDER
LOCATION DETAILED: RIGHT INFERIOR MEDIAL FOREHEAD
LOCATION DETAILED: LEFT PROXIMAL POSTERIOR UPPER ARM
LOCATION DETAILED: RIGHT MEDIAL INFERIOR CHEST
LOCATION DETAILED: LEFT LATERAL SUPERIOR CHEST
LOCATION DETAILED: LEFT VENTRAL PROXIMAL FOREARM

## 2019-10-10 ASSESSMENT — LOCATION SIMPLE DESCRIPTION DERM
LOCATION SIMPLE: LEFT LIP
LOCATION SIMPLE: CHEST
LOCATION SIMPLE: LEFT CALF
LOCATION SIMPLE: LEFT UPPER BACK
LOCATION SIMPLE: RIGHT FOREARM
LOCATION SIMPLE: LEFT CHEEK
LOCATION SIMPLE: RIGHT CALF
LOCATION SIMPLE: POSTERIOR NECK
LOCATION SIMPLE: RIGHT UPPER ARM
LOCATION SIMPLE: RIGHT SHOULDER
LOCATION SIMPLE: LEFT UPPER ARM
LOCATION SIMPLE: LEFT FOREARM
LOCATION SIMPLE: LEFT SHOULDER
LOCATION SIMPLE: RIGHT FOREHEAD
LOCATION SIMPLE: LEFT POSTERIOR THIGH
LOCATION SIMPLE: RIGHT POSTERIOR THIGH
LOCATION SIMPLE: LEFT EYEBROW

## 2019-10-10 NOTE — PROCEDURE: BIOPSY BY SHAVE METHOD
Electrodesiccation And Curettage Text: The wound bed was treated with electrodesiccation and curettage after the biopsy was performed.
Biopsy Type: H and E
Anesthesia Volume In Cc: 0.5
X Size Of Lesion In Cm: 0
Type Of Destruction Used: Electrodesiccation
Consent: Written consent was obtained and risks were reviewed including but not limited to scarring, infection, bleeding, scabbing, incomplete removal, nerve damage and allergy to anesthesia.
Render In Bullet Format When Appropriate: No
Was A Bandage Applied: Yes
Hemostasis: Electrocautery
Dressing: bandage
Silver Nitrate Text: The wound bed was treated with silver nitrate after the biopsy was performed.
Cryotherapy Text: The wound bed was treated with cryotherapy after the biopsy was performed.
Post-Care Instructions: I reviewed with the patient in detail post-care instructions. Patient is to keep the biopsy site dry overnight, and then apply bacitracin twice daily until healed. Patient may apply hydrogen peroxide soaks to remove any crusting.
Biopsy Method: Personna blade
Lab: [id number]
Billing Type: Third-Party Bill
Detail Level: Detailed
Curettage Text: The wound bed was treated with curettage after the biopsy was performed.
Notification Instructions: Patient will be notified of biopsy results. However, patient instructed to call the office if not contacted within 2 weeks.
Electrodesiccation Text: The wound bed was treated with electrodesiccation after the biopsy was performed.
Depth Of Biopsy: dermis
Size Of Lesion In Cm: 0.6
Anesthesia Type: 1% lidocaine with epinephrine
Lab Facility: 
Wound Care: [facility]

## 2019-11-05 LAB
CHOLEST SERPL-MCNC: 144 MG/DL (ref 100–199)
HDLC SERPL-MCNC: 44 MG/DL
LABORATORY COMMENT REPORT: ABNORMAL
LDLC SERPL CALC-MCNC: 47 MG/DL (ref 0–99)
TRIGL SERPL-MCNC: 264 MG/DL (ref 0–149)
VLDLC SERPL CALC-MCNC: 53 MG/DL (ref 5–40)

## 2019-11-06 ENCOUNTER — TELEPHONE (OUTPATIENT)
Dept: CARDIOLOGY | Facility: MEDICAL CENTER | Age: 59
End: 2019-11-06

## 2019-11-06 NOTE — TELEPHONE ENCOUNTER
Associated Results   Result Notes for LIPID PANEL   Notes recorded by APOLONIA Dumont on 11/6/2019 at 9:46 AM PST    Please contact patient and let him know that his lipids are excellent.  Continue current meds.     Letter printed with MD recommendations and mailed to pt mailing address.

## 2020-04-02 DIAGNOSIS — I10 ESSENTIAL HYPERTENSION: ICD-10-CM

## 2020-04-22 DIAGNOSIS — E78.5 DYSLIPIDEMIA: ICD-10-CM

## 2020-04-23 RX ORDER — ATORVASTATIN CALCIUM 80 MG/1
80 TABLET, FILM COATED ORAL EVERY EVENING
Qty: 90 TAB | Refills: 0 | Status: SHIPPED | OUTPATIENT
Start: 2020-04-23 | End: 2020-07-07 | Stop reason: SDUPTHER

## 2020-06-24 ENCOUNTER — PATIENT MESSAGE (OUTPATIENT)
Dept: CARDIOLOGY | Facility: MEDICAL CENTER | Age: 60
End: 2020-06-24

## 2020-06-24 DIAGNOSIS — I25.84 CORONARY ARTERY CALCIFICATION: ICD-10-CM

## 2020-06-24 DIAGNOSIS — I10 ESSENTIAL HYPERTENSION: ICD-10-CM

## 2020-06-24 DIAGNOSIS — I25.10 CORONARY ARTERY CALCIFICATION: ICD-10-CM

## 2020-06-24 DIAGNOSIS — E78.5 DYSLIPIDEMIA: ICD-10-CM

## 2020-07-03 DIAGNOSIS — I10 ESSENTIAL HYPERTENSION: ICD-10-CM

## 2020-07-07 DIAGNOSIS — E78.5 DYSLIPIDEMIA: ICD-10-CM

## 2020-07-07 RX ORDER — ATORVASTATIN CALCIUM 80 MG/1
80 TABLET, FILM COATED ORAL EVERY EVENING
Qty: 90 TAB | Refills: 0 | Status: SHIPPED | OUTPATIENT
Start: 2020-07-07 | End: 2020-08-28 | Stop reason: SDUPTHER

## 2020-07-10 ENCOUNTER — TELEPHONE (OUTPATIENT)
Dept: CARDIOLOGY | Facility: MEDICAL CENTER | Age: 60
End: 2020-07-10

## 2020-07-10 NOTE — TELEPHONE ENCOUNTER
Called patient to see if he has had the chance to complete fasting blood work previously ordered at a lab outside of [facility]. If not, reminded him to complete blood work prior to upcoming appt. Unable to reach patient, left voicemail for call back.

## 2020-07-14 NOTE — TELEPHONE ENCOUNTER
Faxed records request to [facility] in [place] for patients most recent lab results (F: [phone], P: [phone]). Fax confirmation received.

## 2020-07-16 LAB
ALBUMIN SERPL-MCNC: 4.9 G/DL (ref 3.8–4.8)
ALBUMIN/GLOB SERPL: 1.9 {RATIO} (ref 1.2–2.2)
ALP SERPL-CCNC: 76 IU/L (ref 39–117)
ALT SERPL-CCNC: 26 IU/L (ref 0–44)
AST SERPL-CCNC: 22 IU/L (ref 0–40)
BILIRUB SERPL-MCNC: 0.8 MG/DL (ref 0–1.2)
BUN SERPL-MCNC: 17 MG/DL (ref 8–27)
BUN/CREAT SERPL: 19 (ref 10–24)
CALCIUM SERPL-MCNC: 9.5 MG/DL (ref 8.6–10.2)
CHLORIDE SERPL-SCNC: 102 MMOL/L (ref 96–106)
CHOLEST SERPL-MCNC: 167 MG/DL (ref 100–199)
CO2 SERPL-SCNC: 20 MMOL/L (ref 20–29)
CREAT SERPL-MCNC: 0.89 MG/DL (ref 0.76–1.27)
GLOBULIN SER CALC-MCNC: 2.6 G/DL (ref 1.5–4.5)
GLUCOSE SERPL-MCNC: 123 MG/DL (ref 65–99)
HDLC SERPL-MCNC: 45 MG/DL
LABORATORY COMMENT REPORT: ABNORMAL
LDLC SERPL CALC-MCNC: ABNORMAL MG/DL (ref 0–99)
POTASSIUM SERPL-SCNC: 3.7 MMOL/L (ref 3.5–5.2)
PROT SERPL-MCNC: 7.5 G/DL (ref 6–8.5)
SODIUM SERPL-SCNC: 140 MMOL/L (ref 134–144)
TRIGL SERPL-MCNC: 430 MG/DL (ref 0–149)
VLDLC SERPL CALC-MCNC: ABNORMAL MG/DL (ref 5–40)

## 2020-07-17 ENCOUNTER — OFFICE VISIT (OUTPATIENT)
Dept: CARDIOLOGY | Facility: MEDICAL CENTER | Age: 60
End: 2020-07-17
Payer: COMMERCIAL

## 2020-07-17 VITALS
HEIGHT: 68 IN | OXYGEN SATURATION: 96 % | SYSTOLIC BLOOD PRESSURE: 132 MMHG | BODY MASS INDEX: 28.6 KG/M2 | HEART RATE: 72 BPM | WEIGHT: 188.71 LBS | DIASTOLIC BLOOD PRESSURE: 78 MMHG

## 2020-07-17 DIAGNOSIS — I25.84 CORONARY ARTERY CALCIFICATION: ICD-10-CM

## 2020-07-17 DIAGNOSIS — I10 ESSENTIAL HYPERTENSION: ICD-10-CM

## 2020-07-17 DIAGNOSIS — I25.10 CORONARY ARTERY CALCIFICATION: ICD-10-CM

## 2020-07-17 PROCEDURE — 99214 OFFICE O/P EST MOD 30 MIN: CPT | Performed by: INTERNAL MEDICINE

## 2020-07-17 RX ORDER — OMEPRAZOLE 40 MG/1
CAPSULE, DELAYED RELEASE ORAL
COMMUNITY
Start: 2020-05-19 | End: 2021-08-20 | Stop reason: SDUPTHER

## 2020-07-17 RX ORDER — ICOSAPENT ETHYL 1000 MG/1
2 CAPSULE ORAL 2 TIMES DAILY
Qty: 360 CAP | Refills: 3 | Status: SHIPPED | OUTPATIENT
Start: 2020-07-17 | End: 2021-09-27

## 2020-07-17 ASSESSMENT — FIBROSIS 4 INDEX: FIB4 SCORE: 1.22

## 2020-07-17 NOTE — PROGRESS NOTES
"CARDIOLOGY OUTPATIENT FOLLOWUP    PCP: Mady Mercado M.D.    1. Essential hypertension  Well controlled. Stop metoprolol as it is not clearly indicated.  He will self monitor the blood pressure and if greater than 130/80 or report back to myself for Dr. Mercado    2. Coronary artery calcification  With non-obs CAD on cath. Continue statin, aspirin    3. Hypertriglyceridemia. Eicosapentaenoic ethyl prescribed. Continue statin. Counseled about low carbohydrate diet  Lipid profile in 6 months.      Follow up with as needed    Chief Complaint   Patient presents with   • HTN (Controlled)       History: Víctor Hollis is a 60 y.o. male with a past medical history of hypertension presenting for follow up of coronary arteriosclerosis.  About a year ago he had an executive physical and was diagnosed with coronary calcification.  He subsequently underwent cardiac catheterization showing nonobstructive disease.  He has been taking statin therapy.  As well as aspirin and not having any difficulties.  He continues to exercise several times per week without physical limitation.  He recently completed a lipid profile and it demonstrated marked elevation in triglycerides but a non-HDL cholesterol in the 120s.  He has had well-controlled blood pressure and wonders if metoprolol is still indicated.  He believes he eats mostly a balanced diet but does include carbohydrates.  He drinks a few alcoholic beverages daily.    ROS:  All other systems reviewed and negative except as per the HPI    PE:  /78 (BP Location: Left arm, Patient Position: Sitting, BP Cuff Size: Adult)   Pulse 72   Ht 1.727 m (5' 8\")   Wt 85.6 kg (188 lb 11.4 oz)   SpO2 96%   BMI 28.69 kg/m²   Gen: Well appearing  HEENT: Symmetric face. Anicteric sclerae. Moist mucus membranes  NECK: No JVD. No lymphadenopathy  CARDIAC: Normal PMI, regular, normal S1, S2.  VASCULATURE: Normal carotid amplitude without bruit.   RESP: Clear to auscultation " "bilaterally  ABD: Soft, non-tender, non-distended  EXT: No edema, no clubbing or cyanosis  SKIN: Warm and dry  NEURO: No gross deficits  PSYCH: Appropriate affect, participates in conversation    Past Medical History:   Diagnosis Date   • Allergy    • GERD (gastroesophageal reflux disease)    • Hepatitis 2006    patient denies; never been told; not sure where it came from   • Hiatal hernia    • High cholesterol    • Hypertension      Allergies   Allergen Reactions   • Other Food Itching     Shell fish; Pt states \"at times skin rash and/or puffy/itchy eyes\"     Outpatient Encounter Medications as of 7/17/2020   Medication Sig Dispense Refill   • omeprazole (PRILOSEC) 40 MG delayed-release capsule      • Icosapent Ethyl 1 g Cap Take 2 Tabs by mouth 2 Times a Day. 360 Cap 3   • atorvastatin (LIPITOR) 80 MG tablet Take 1 Tab by mouth every evening. For further refills please contact new cardiologist. Thank you 90 Tab 0   • Cholecalciferol (VITAMIN D-3) 5000 units Tab Take  by mouth.     • aspirin (ASA) 81 MG Chew Tab chewable tablet Take 1 Tab by mouth every day. 100 Tab 3   • amlodipine-benazepril (LOTREL) 10-20 MG per capsule Take 1 Cap by mouth every day.     • therapeutic multivitamin-minerals (THERAGRAN-M) Tab Take 1 Tab by mouth every day.     • Probiotic Product (PROBIOTIC FORMULA PO) Take  by mouth.     • Fexofenadine HCl (ALLEGRA ALLERGY PO) Take  by mouth.     • [DISCONTINUED] metoprolol (LOPRESSOR) 25 MG Tab Take 1 Tab by mouth 2 times a day. For further refills please contact new cardiologist. Thank you 180 Tab 0   • [DISCONTINUED] omeprazole (PRILOSEC) 20 MG delayed-release capsule Take 20 mg by mouth every day.       No facility-administered encounter medications on file as of 7/17/2020.      Social History     Socioeconomic History   • Marital status:      Spouse name: Not on file   • Number of children: Not on file   • Years of education: Not on file   • Highest education level: Not on file "   Occupational History   • Not on file   Social Needs   • Financial resource strain: Not on file   • Food insecurity     Worry: Not on file     Inability: Not on file   • Transportation needs     Medical: Not on file     Non-medical: Not on file   Tobacco Use   • Smoking status: Former Smoker     Packs/day: 0.25     Years: 3.00     Pack years: 0.75     Types: Cigarettes     Start date:      Last attempt to quit:      Years since quittin.5   • Smokeless tobacco: Never Used   Substance and Sexual Activity   • Alcohol use: Yes     Comment: 2-3 drinks/day    • Drug use: No   • Sexual activity: Not on file   Lifestyle   • Physical activity     Days per week: Not on file     Minutes per session: Not on file   • Stress: Not on file   Relationships   • Social connections     Talks on phone: Not on file     Gets together: Not on file     Attends Synagogue service: Not on file     Active member of club or organization: Not on file     Attends meetings of clubs or organizations: Not on file     Relationship status: Not on file   • Intimate partner violence     Fear of current or ex partner: Not on file     Emotionally abused: Not on file     Physically abused: Not on file     Forced sexual activity: Not on file   Other Topics Concern   • Not on file   Social History Narrative   • Not on file       Studies  Lab Results   Component Value Date/Time    CHOLSTRLTOT 167 07/15/2020 05:07 AM    CHOLSTRLTOT 217 (H) 2019 08:00 AM    LDL Comment 07/15/2020 05:07 AM    HDL 45 07/15/2020 05:07 AM    HDL 46 2019 08:00 AM    TRIGLYCERIDE 430 (H) 07/15/2020 05:07 AM    TRIGLYCERIDE 356 (H) 2019 08:00 AM       Lab Results   Component Value Date/Time    SODIUM 140 07/15/2020 05:07 AM    SODIUM 140 2019 08:37 AM    POTASSIUM 3.7 07/15/2020 05:07 AM    POTASSIUM 4.1 2019 08:37 AM    CHLORIDE 102 07/15/2020 05:07 AM    CHLORIDE 105 2019 08:37 AM    CO2 20 07/15/2020 05:07 AM    CO2 26 2019  08:37 AM    GLUCOSE 123 (H) 07/15/2020 05:07 AM    GLUCOSE 122 (H) 06/03/2019 08:37 AM    BUN 17 07/15/2020 05:07 AM    BUN 13 06/03/2019 08:37 AM    CREATININE 0.89 07/15/2020 05:07 AM    CREATININE 0.85 06/03/2019 08:37 AM    BUNCREATRAT 19 07/15/2020 05:07 AM     Lab Results   Component Value Date/Time    ALKPHOSPHAT 76 07/15/2020 05:07 AM    ALKPHOSPHAT 68 06/03/2019 08:37 AM    ASTSGOT 22 07/15/2020 05:07 AM    ASTSGOT 24 06/03/2019 08:37 AM    ALTSGPT 26 07/15/2020 05:07 AM    ALTSGPT 32 06/03/2019 08:37 AM    TBILIRUBIN 0.8 07/15/2020 05:07 AM    TBILIRUBIN 0.8 06/03/2019 08:37 AM        For this encounter I directly reviewed medical records I agree with the interpretations in the electronic health record

## 2020-07-20 ENCOUNTER — PATIENT MESSAGE (OUTPATIENT)
Dept: CARDIOLOGY | Facility: MEDICAL CENTER | Age: 60
End: 2020-07-20

## 2020-07-20 NOTE — PATIENT COMMUNICATION
"PAR sent to plan:    Víctor Hollis (Stanley: [id number])  Rx #: [id number]  Vascepa 1GM capsules     Form  [employer] Electronic Prior Authorization Form (2017 NCPDP)  Created  3 days ago  Sent to Plan  less than a minute ago  Plan Response  less than a minute ago  Submit Clinical Questions  Expires 3 days from now  Determination  Clinical Questions Are Ready  Fill out the questions below and click \"Send to Plan.\" The plan requires answers to the clinical questions for this electronic prior authorization.      "

## 2020-07-24 ENCOUNTER — TELEPHONE (OUTPATIENT)
Dept: CARDIOLOGY | Facility: MEDICAL CENTER | Age: 60
End: 2020-07-24

## 2020-07-24 DIAGNOSIS — E78.5 DYSLIPIDEMIA: ICD-10-CM

## 2020-07-24 DIAGNOSIS — I25.84 CORONARY ARTERY CALCIFICATION: ICD-10-CM

## 2020-07-24 DIAGNOSIS — I25.10 CORONARY ARTERY CALCIFICATION: ICD-10-CM

## 2020-07-24 NOTE — TELEPHONE ENCOUNTER
[facility] In order to qualify pt must    -have been receiving at least 12 consecutive weeks of ezetimibe therapy in addition to statin OR  -have a history of intolerance to ezetimibe OR  -have a LDL greater than 100 while on max tolerated statin

## 2020-07-27 RX ORDER — EZETIMIBE 10 MG/1
10 TABLET ORAL DAILY
Qty: 90 TAB | Refills: 3 | Status: SHIPPED | OUTPATIENT
Start: 2020-07-27 | End: 2020-11-03 | Stop reason: SDUPTHER

## 2020-07-27 NOTE — TELEPHONE ENCOUNTER
Pt called back. He would like to proceed with Zetia. Rx sent. Lipid profile ordered and mailed to pt. Also clarified with him that he should continue taking his atorvastatin with the Zetia.

## 2020-07-27 NOTE — TELEPHONE ENCOUNTER
Chidi Olivares M.D.  You 3 days ago       I suggest Zetia 10 mg, repeat lipids in 12 weeks. If Trig still substantially elevated then we can try eicosapentaenoic ethyl      LVM with pt at [phone] notifying that [employer] message would be sent regarding his denied Vascepa Rx.

## 2020-08-28 DIAGNOSIS — E78.5 DYSLIPIDEMIA: ICD-10-CM

## 2020-08-31 RX ORDER — ATORVASTATIN CALCIUM 80 MG/1
80 TABLET, FILM COATED ORAL EVERY EVENING
Qty: 90 TAB | Refills: 1 | Status: SHIPPED | OUTPATIENT
Start: 2020-08-31 | End: 2021-03-26

## 2020-10-30 LAB
CHOLEST SERPL-MCNC: 128 MG/DL (ref 100–199)
HDLC SERPL-MCNC: 48 MG/DL
LABORATORY COMMENT REPORT: ABNORMAL
LDLC SERPL CALC-MCNC: 50 MG/DL (ref 0–99)
ONE SPECIMEN IDENTIFIER 977740: NORMAL
TRIGL SERPL-MCNC: 184 MG/DL (ref 0–149)
VLDLC SERPL CALC-MCNC: 30 MG/DL (ref 5–40)

## 2020-11-02 ENCOUNTER — PATIENT MESSAGE (OUTPATIENT)
Dept: CARDIOLOGY | Facility: MEDICAL CENTER | Age: 60
End: 2020-11-02

## 2020-11-02 DIAGNOSIS — E78.5 DYSLIPIDEMIA: ICD-10-CM

## 2020-11-03 RX ORDER — EZETIMIBE 10 MG/1
10 TABLET ORAL DAILY
Qty: 30 TAB | Refills: 0 | Status: SHIPPED | OUTPATIENT
Start: 2020-11-03 | End: 2021-02-17 | Stop reason: SDUPTHER

## 2020-11-11 ENCOUNTER — APPOINTMENT (RX ONLY)
Dept: URBAN - METROPOLITAN AREA CLINIC 4 | Facility: CLINIC | Age: 60
Setting detail: DERMATOLOGY
End: 2020-11-11

## 2020-11-11 DIAGNOSIS — L82.1 OTHER SEBORRHEIC KERATOSIS: ICD-10-CM

## 2020-11-11 DIAGNOSIS — D22 MELANOCYTIC NEVI: ICD-10-CM

## 2020-11-11 DIAGNOSIS — L57.0 ACTINIC KERATOSIS: ICD-10-CM

## 2020-11-11 DIAGNOSIS — D18.0 HEMANGIOMA: ICD-10-CM

## 2020-11-11 DIAGNOSIS — L81.4 OTHER MELANIN HYPERPIGMENTATION: ICD-10-CM

## 2020-11-11 DIAGNOSIS — L73.8 OTHER SPECIFIED FOLLICULAR DISORDERS: ICD-10-CM

## 2020-11-11 DIAGNOSIS — B35.3 TINEA PEDIS: ICD-10-CM

## 2020-11-11 DIAGNOSIS — Z71.89 OTHER SPECIFIED COUNSELING: ICD-10-CM

## 2020-11-11 DIAGNOSIS — L91.8 OTHER HYPERTROPHIC DISORDERS OF THE SKIN: ICD-10-CM

## 2020-11-11 PROBLEM — D22.71 MELANOCYTIC NEVI OF RIGHT LOWER LIMB, INCLUDING HIP: Status: ACTIVE | Noted: 2020-11-11

## 2020-11-11 PROBLEM — D22.72 MELANOCYTIC NEVI OF LEFT LOWER LIMB, INCLUDING HIP: Status: ACTIVE | Noted: 2020-11-11

## 2020-11-11 PROBLEM — D22.39 MELANOCYTIC NEVI OF OTHER PARTS OF FACE: Status: ACTIVE | Noted: 2020-11-11

## 2020-11-11 PROBLEM — D18.01 HEMANGIOMA OF SKIN AND SUBCUTANEOUS TISSUE: Status: ACTIVE | Noted: 2020-11-11

## 2020-11-11 PROBLEM — D22.62 MELANOCYTIC NEVI OF LEFT UPPER LIMB, INCLUDING SHOULDER: Status: ACTIVE | Noted: 2020-11-11

## 2020-11-11 PROBLEM — D22.4 MELANOCYTIC NEVI OF SCALP AND NECK: Status: ACTIVE | Noted: 2020-11-11

## 2020-11-11 PROBLEM — D22.61 MELANOCYTIC NEVI OF RIGHT UPPER LIMB, INCLUDING SHOULDER: Status: ACTIVE | Noted: 2020-11-11

## 2020-11-11 PROBLEM — D22.5 MELANOCYTIC NEVI OF TRUNK: Status: ACTIVE | Noted: 2020-11-11

## 2020-11-11 PROCEDURE — ? ADDITIONAL NOTES

## 2020-11-11 PROCEDURE — 99214 OFFICE O/P EST MOD 30 MIN: CPT | Mod: 25

## 2020-11-11 PROCEDURE — ? BENIGN DESTRUCTION COSMETIC

## 2020-11-11 PROCEDURE — ? COUNSELING

## 2020-11-11 PROCEDURE — ? LIQUID NITROGEN

## 2020-11-11 PROCEDURE — 17000 DESTRUCT PREMALG LESION: CPT

## 2020-11-11 ASSESSMENT — LOCATION SIMPLE DESCRIPTION DERM
LOCATION SIMPLE: CHEST
LOCATION SIMPLE: LEFT FOREARM
LOCATION SIMPLE: RIGHT SHOULDER
LOCATION SIMPLE: RIGHT ZYGOMA
LOCATION SIMPLE: LEFT CHEEK
LOCATION SIMPLE: RIGHT FOREARM
LOCATION SIMPLE: LEFT ANTERIOR NECK
LOCATION SIMPLE: LEFT THIGH
LOCATION SIMPLE: RIGHT THIGH
LOCATION SIMPLE: LEFT SCALP
LOCATION SIMPLE: LEFT UPPER BACK
LOCATION SIMPLE: LEFT EYEBROW
LOCATION SIMPLE: SCALP
LOCATION SIMPLE: INFERIOR FOREHEAD
LOCATION SIMPLE: LEFT POSTERIOR THIGH
LOCATION SIMPLE: POSTERIOR NECK
LOCATION SIMPLE: RIGHT POSTERIOR THIGH
LOCATION SIMPLE: RIGHT UPPER ARM
LOCATION SIMPLE: RIGHT CHEEK
LOCATION SIMPLE: RIGHT CALF
LOCATION SIMPLE: LEFT SHOULDER
LOCATION SIMPLE: RIGHT FOREHEAD
LOCATION SIMPLE: LEFT 5TH TOE
LOCATION SIMPLE: LEFT UPPER ARM
LOCATION SIMPLE: LEFT CALF

## 2020-11-11 ASSESSMENT — LOCATION DETAILED DESCRIPTION DERM
LOCATION DETAILED: LEFT SUPERIOR LATERAL UPPER BACK
LOCATION DETAILED: RIGHT LATERAL SUPERIOR CHEST
LOCATION DETAILED: LEFT MEDIAL FRONTAL SCALP
LOCATION DETAILED: LEFT ANTERIOR PROXIMAL THIGH
LOCATION DETAILED: LEFT PROXIMAL DORSAL FOREARM
LOCATION DETAILED: LEFT SUPERIOR PARIETAL SCALP
LOCATION DETAILED: LEFT LATERAL SUPERIOR CHEST
LOCATION DETAILED: RIGHT DISTAL POSTERIOR UPPER ARM
LOCATION DETAILED: RIGHT ANTERIOR PROXIMAL THIGH
LOCATION DETAILED: LEFT VENTRAL DISTAL FOREARM
LOCATION DETAILED: RIGHT CENTRAL MALAR CHEEK
LOCATION DETAILED: RIGHT MEDIAL TRAPEZIAL NECK
LOCATION DETAILED: RIGHT MEDIAL INFERIOR CHEST
LOCATION DETAILED: LEFT VENTRAL PROXIMAL FOREARM
LOCATION DETAILED: LEFT DISTAL POSTERIOR THIGH
LOCATION DETAILED: RIGHT DISTAL POSTERIOR THIGH
LOCATION DETAILED: LEFT MEDIAL SUPERIOR CHEST
LOCATION DETAILED: RIGHT PROXIMAL DORSAL FOREARM
LOCATION DETAILED: LEFT INFERIOR CENTRAL MALAR CHEEK
LOCATION DETAILED: RIGHT INFERIOR MEDIAL FOREHEAD
LOCATION DETAILED: RIGHT VENTRAL DISTAL FOREARM
LOCATION DETAILED: RIGHT VENTRAL PROXIMAL FOREARM
LOCATION DETAILED: LEFT LATERAL EYEBROW
LOCATION DETAILED: INFERIOR MID FOREHEAD
LOCATION DETAILED: LEFT LATERAL 5TH TOE
LOCATION DETAILED: RIGHT PROXIMAL CALF
LOCATION DETAILED: LEFT ANTERIOR DISTAL UPPER ARM
LOCATION DETAILED: RIGHT ANTERIOR DISTAL UPPER ARM
LOCATION DETAILED: RIGHT MEDIAL ZYGOMA
LOCATION DETAILED: LEFT CLAVICULAR NECK
LOCATION DETAILED: LEFT PROXIMAL POSTERIOR UPPER ARM
LOCATION DETAILED: LEFT POSTERIOR SHOULDER
LOCATION DETAILED: LEFT PROXIMAL CALF
LOCATION DETAILED: RIGHT POSTERIOR SHOULDER

## 2020-11-11 ASSESSMENT — LOCATION ZONE DERM
LOCATION ZONE: TOE
LOCATION ZONE: SCALP
LOCATION ZONE: TRUNK
LOCATION ZONE: NECK
LOCATION ZONE: ARM
LOCATION ZONE: FACE
LOCATION ZONE: LEG

## 2020-11-11 NOTE — PROCEDURE: MIPS QUALITY
Detail Level: Detailed
Quality 226: Preventive Care And Screening: Tobacco Use: Screening And Cessation Intervention: Patient screened for tobacco use and is an ex/non-smoker
Quality 265: Biopsy Follow-Up: Biopsy results reviewed, communicated, tracked, and documented
Quality 130: Documentation Of Current Medications In The Medical Record: Current Medications Documented

## 2021-01-22 ENCOUNTER — TELEPHONE (OUTPATIENT)
Dept: SCHEDULING | Facility: IMAGING CENTER | Age: 61
End: 2021-01-22

## 2021-02-09 ENCOUNTER — TELEPHONE (OUTPATIENT)
Dept: MEDICAL GROUP | Facility: MEDICAL CENTER | Age: 61
End: 2021-02-09

## 2021-02-09 NOTE — TELEPHONE ENCOUNTER
Left message for patient to call back regarding pre-visit planning. Please transfer call to Alicja at [phone] or Katharine at [phone].    Message left informing patient of appointment and need to complete documentation. Given [phone] number.

## 2021-02-10 SDOH — ECONOMIC STABILITY: HOUSING INSECURITY
IN THE LAST 12 MONTHS, WAS THERE A TIME WHEN YOU DID NOT HAVE A STEADY PLACE TO SLEEP OR SLEPT IN A SHELTER (INCLUDING NOW)?: NO

## 2021-02-10 SDOH — ECONOMIC STABILITY: INCOME INSECURITY: IN THE LAST 12 MONTHS, WAS THERE A TIME WHEN YOU WERE NOT ABLE TO PAY THE MORTGAGE OR RENT ON TIME?: NO

## 2021-02-10 SDOH — HEALTH STABILITY: PHYSICAL HEALTH: ON AVERAGE, HOW MANY DAYS PER WEEK DO YOU ENGAGE IN MODERATE TO STRENUOUS EXERCISE (LIKE A BRISK WALK)?: 5 DAYS

## 2021-02-10 SDOH — HEALTH STABILITY: PHYSICAL HEALTH: ON AVERAGE, HOW MANY MINUTES DO YOU ENGAGE IN EXERCISE AT THIS LEVEL?: 40 MINUTES

## 2021-02-10 SDOH — ECONOMIC STABILITY: HOUSING INSECURITY: IN THE LAST 12 MONTHS, HOW MANY PLACES HAVE YOU LIVED?: 1

## 2021-02-10 SDOH — ECONOMIC STABILITY: TRANSPORTATION INSECURITY
IN THE PAST 12 MONTHS, HAS LACK OF RELIABLE TRANSPORTATION KEPT YOU FROM MEDICAL APPOINTMENTS, MEETINGS, WORK OR FROM GETTING THINGS NEEDED FOR DAILY LIVING?: NO

## 2021-02-10 SDOH — HEALTH STABILITY: MENTAL HEALTH
STRESS IS WHEN SOMEONE FEELS TENSE, NERVOUS, ANXIOUS, OR CAN'T SLEEP AT NIGHT BECAUSE THEIR MIND IS TROUBLED. HOW STRESSED ARE YOU?: NOT AT ALL

## 2021-02-10 SDOH — ECONOMIC STABILITY: TRANSPORTATION INSECURITY
IN THE PAST 12 MONTHS, HAS THE LACK OF TRANSPORTATION KEPT YOU FROM MEDICAL APPOINTMENTS OR FROM GETTING MEDICATIONS?: NO

## 2021-02-10 ASSESSMENT — SOCIAL DETERMINANTS OF HEALTH (SDOH)
HOW OFTEN DO YOU ATTEND CHURCH OR RELIGIOUS SERVICES?: NEVER
HOW OFTEN DO YOU GET TOGETHER WITH FRIENDS OR RELATIVES?: TWICE A WEEK
HOW OFTEN DO YOU ATTENT MEETINGS OF THE CLUB OR ORGANIZATION YOU BELONG TO?: MORE THAN 4 TIMES PER YEAR
HOW OFTEN DO YOU HAVE SIX OR MORE DRINKS ON ONE OCCASION: MONTHLY
WITHIN THE PAST 12 MONTHS, YOU WORRIED THAT YOUR FOOD WOULD RUN OUT BEFORE YOU GOT THE MONEY TO BUY MORE: NEVER TRUE
HOW HARD IS IT FOR YOU TO PAY FOR THE VERY BASICS LIKE FOOD, HOUSING, MEDICAL CARE, AND HEATING?: NOT HARD AT ALL
IN A TYPICAL WEEK, HOW MANY TIMES DO YOU TALK ON THE PHONE WITH FAMILY, FRIENDS, OR NEIGHBORS?: MORE THAN THREE TIMES A WEEK
DO YOU BELONG TO ANY CLUBS OR ORGANIZATIONS SUCH AS CHURCH GROUPS UNIONS, FRATERNAL OR ATHLETIC GROUPS, OR SCHOOL GROUPS?: YES
HOW MANY DRINKS CONTAINING ALCOHOL DO YOU HAVE ON A TYPICAL DAY WHEN YOU ARE DRINKING: 1 OR 2
HOW OFTEN DO YOU HAVE A DRINK CONTAINING ALCOHOL: 4 OR MORE TIMES A WEEK
WITHIN THE PAST 12 MONTHS, THE FOOD YOU BOUGHT JUST DIDN'T LAST AND YOU DIDN'T HAVE MONEY TO GET MORE: NEVER TRUE

## 2021-02-10 NOTE — TELEPHONE ENCOUNTER
Patient called and informed of upcoming visit. I completed PVP with patient. When asked, patient did not express any questions.   Outside information NOT reconciled using the [employer] feature. Per Nae Veliz, the [employer] feature is down as of 02/09/2021 at 2:00pm. Will reconcile outside information at a later date.

## 2021-02-10 NOTE — TELEPHONE ENCOUNTER
NEW PATIENT VISIT PRE-VISIT PLANNING    1.  [employer] Patient is checked in Patient Demographics?Yes    2.  Immunizations were updated in Epic using Reconcile Outside Information activity? Yes         3.  Is this appointment scheduled as a Hospital Follow-Up? No    4.  Patient is due for the following Health Maintenance Topics:   Health Maintenance Due   Topic Date Due   • COLONOSCOPY  06/23/2009         5.  Reviewed/Updated the following with patient:       •   Preferred Pharmacy? Yes       •   Preferred Lab? Yes       •   Preferred Communication? Yes       •   Allergies? Yes       •   Medications? YES. Was Abstract Encounter opened and chart updated? YES       •   Social History? Yes       •   Family History (document living status of immediate family members and if + hx of  cancer, diabetes, hypertension, hyperlipidemia, heart attack, stroke) No    6.  Updated Care Team?       •   DME Company (gait device, O2, CPAP, etc.) N\A       •   Other Specialists (eye doctor, derm, GYN, cardiology, endo, etc): YES    7.  AHA (Puls8) form printed for Provider? N/A

## 2021-02-17 ENCOUNTER — OFFICE VISIT (OUTPATIENT)
Dept: MEDICAL GROUP | Facility: MEDICAL CENTER | Age: 61
End: 2021-02-17
Payer: COMMERCIAL

## 2021-02-17 VITALS
BODY MASS INDEX: 27.06 KG/M2 | WEIGHT: 178.57 LBS | OXYGEN SATURATION: 96 % | DIASTOLIC BLOOD PRESSURE: 67 MMHG | SYSTOLIC BLOOD PRESSURE: 128 MMHG | TEMPERATURE: 98 F | HEIGHT: 68 IN | HEART RATE: 78 BPM | RESPIRATION RATE: 14 BRPM

## 2021-02-17 DIAGNOSIS — I25.84 CORONARY ARTERY CALCIFICATION: ICD-10-CM

## 2021-02-17 DIAGNOSIS — E55.9 VITAMIN D DEFICIENCY: ICD-10-CM

## 2021-02-17 DIAGNOSIS — Z98.890 S/P CARDIAC CATH: ICD-10-CM

## 2021-02-17 DIAGNOSIS — E78.5 DYSLIPIDEMIA: ICD-10-CM

## 2021-02-17 DIAGNOSIS — I10 ESSENTIAL HYPERTENSION: ICD-10-CM

## 2021-02-17 DIAGNOSIS — I25.10 CORONARY ARTERY CALCIFICATION: ICD-10-CM

## 2021-02-17 PROCEDURE — 99214 OFFICE O/P EST MOD 30 MIN: CPT | Performed by: FAMILY MEDICINE

## 2021-02-17 RX ORDER — EZETIMIBE 10 MG/1
10 TABLET ORAL DAILY
Qty: 90 TABLET | Refills: 3 | Status: SHIPPED | OUTPATIENT
Start: 2021-02-17 | End: 2022-02-11

## 2021-02-17 ASSESSMENT — ENCOUNTER SYMPTOMS
VOMITING: 0
EYE DISCHARGE: 0
SHORTNESS OF BREATH: 0
EYE REDNESS: 0
CHILLS: 0
SENSORY CHANGE: 0
NERVOUS/ANXIOUS: 0
HEADACHES: 0
PALPITATIONS: 0
ABDOMINAL PAIN: 0
SINUS PAIN: 0
DIARRHEA: 0
MYALGIAS: 0
DEPRESSION: 0
EYE PAIN: 0
FEVER: 0
HEMOPTYSIS: 0
DIZZINESS: 0
COUGH: 0
FOCAL WEAKNESS: 0
WHEEZING: 0
WEIGHT LOSS: 0
SPUTUM PRODUCTION: 0
NAUSEA: 0
CONSTIPATION: 0

## 2021-02-17 ASSESSMENT — FIBROSIS 4 INDEX: FIB4 SCORE: 1.22

## 2021-02-17 ASSESSMENT — PATIENT HEALTH QUESTIONNAIRE - PHQ9: CLINICAL INTERPRETATION OF PHQ2 SCORE: 0

## 2021-02-17 NOTE — ASSESSMENT & PLAN NOTE
Chronic problem for this patient.  His previous vitamin D level came back 9.  He reports that he was taking vitamin D 5000 international units daily and his vitamin D level came back in normal range so he stopped taking vitamin D supplementation.  Currently taking multivitamins.

## 2021-02-17 NOTE — LETTER
[facility]  Lawrence Merritt M.D.  [address]  [place] [address]  Fax: [phone]   Authorization for Release/Disclosure of   Protected Health Information   Name: VÍCTOR HOLLIS : 1960 SSN: [id number]   Address: [address] [place] [address]  [place] [address] Phone:    [phone] (home) [phone] (work)   I authorize the entity listed below to release/disclose the PHI below to:   [facility]/Lawrence Merritt M.D. and Lawrence Merritt M.D.   Provider or Entity Name:     Address   City, State, Zip   Phone:      Fax:     Reason for request: continuity of care   Information to be released:    [  ] LAST COLONOSCOPY,  including any PATH REPORT and follow-up  [  ] LAST FIT/COLOGUARD RESULT [  ] LAST DEXA  [  ] LAST MAMMOGRAM  [  ] LAST PAP  [  ] LAST LABS [  ] RETINA EXAM REPORT  [  ] IMMUNIZATION RECORDS  [  ] Release all info      [  ] Check here and initial the line next to each item to release ALL health information INCLUDING  _____ Care and treatment for drug and / or alcohol abuse  _____ HIV testing, infection status, or AIDS  _____ Genetic Testing    DATES OF SERVICE OR TIME PERIOD TO BE DISCLOSED: _____________  I understand and acknowledge that:  * This Authorization may be revoked at any time by you in writing, except if your health information has already been used or disclosed.  * Your health information that will be used or disclosed as a result of you signing this authorization could be re-disclosed by the recipient. If this occurs, your re-disclosed health information may no longer be protected by State or Federal laws.  * You may refuse to sign this Authorization. Your refusal will not affect your ability to obtain treatment.  * This Authorization becomes effective upon signing and will  on (date) __________.      If no date is indicated, this Authorization will  one (1) year from the signature date.    Name: Víctor Hollis    Signature:   Date:          2/17/2021       PLEASE FAX REQUESTED RECORDS BACK TO: [phone]

## 2021-02-17 NOTE — ASSESSMENT & PLAN NOTE
Reports that had executive physical which showed abnormal stress test and had cardiac catheterization in 2019 which showed no obstructive coronary artery disease.  Currently taking atorvastatin and aspirin.  Following with cardiology.

## 2021-02-17 NOTE — ASSESSMENT & PLAN NOTE
This is a chronic health problem for this patient.    CT cardiac scoring in 2019 showed Total Calcium Score: 1365  Percentile: Calcium score is above the 90th percentile for the patient's age and sex.  Other findings:  Heart: Normal size.  Lungs: Clear.  Mediastinum: Normal.  Upper abdomen: Normal.    Currently taking atorvastatin 80 mg and aspirin 81 mg once daily

## 2021-02-17 NOTE — ASSESSMENT & PLAN NOTE
This is a chronic health problem for this patient.  Currently taking amlodipine and Benzapril combination 10-20 mg once daily.  Blood pressure today of 128/67.  Denies any side effects with this medication.  Denies any chest pain, palpitations, shortness of breath, lower extremity swelling.

## 2021-02-17 NOTE — PROGRESS NOTES
FAMILY MEDICINE VISIT                                                               Chief complaint::Diagnoses of Dyslipidemia, Vitamin D deficiency, S/P cardiac cath, Essential hypertension, Coronary artery calcification, and Mixed hyperlipidemia were pertinent to this visit.    History of present illness: Víctor Hollis is a 60 y.o. male who presented to establish care, medication refill.    Previous primary care physician Dr. Mercado at [facility].  Requested records from previous primary care physician.    Vitamin D deficiency  Chronic problem for this patient.  His previous vitamin D level came back 9.  He reports that he was taking vitamin D 5000 international units daily and his vitamin D level came back in normal range so he stopped taking vitamin D supplementation.  Currently taking multivitamins.    S/P cardiac cath  Reports that had executive physical which showed abnormal stress test and had cardiac catheterization in 2019 which showed no obstructive coronary artery disease.  Currently taking atorvastatin and aspirin.  Following with cardiology.    Essential hypertension  This is a chronic health problem for this patient.  Currently taking amlodipine and Benzapril combination 10-20 mg once daily.  Blood pressure today of 128/67.  Denies any side effects with this medication.  Denies any chest pain, palpitations, shortness of breath, lower extremity swelling.    Dyslipidemia  This is a chronic health problem for this patient.  Currently taking atorvastatin 80 mg once daily and Zetia 10 mg daily.  Denies any side effects with this medications.    Lab Results   Component Value Date/Time    CHOLSTRLTOT 128 10/29/2020 0635    TRIGLYCERIDE 184 (H) 10/29/2020 0635    HDL 48 10/29/2020 0635    LDL Comment 07/15/2020 0507       Coronary artery calcification  This is a chronic health problem for this patient.    CT cardiac scoring in 2019 showed Total Calcium Score: 1365  Percentile: Calcium score is above the  90th percentile for the patient's age and sex.  Other findings:  Heart: Normal size.  Lungs: Clear.  Mediastinum: Normal.  Upper abdomen: Normal.    Currently taking atorvastatin 80 mg and aspirin 81 mg once daily      Review of systems:     Review of Systems   Constitutional: Negative for chills, fever, malaise/fatigue and weight loss.   HENT: Negative for ear discharge, ear pain, hearing loss and sinus pain.    Eyes: Negative for pain, discharge and redness.   Respiratory: Negative for cough, hemoptysis, sputum production, shortness of breath and wheezing.    Cardiovascular: Negative for chest pain, palpitations and leg swelling.   Gastrointestinal: Negative for abdominal pain, constipation, diarrhea, nausea and vomiting.   Genitourinary: Negative for dysuria, frequency and urgency.   Musculoskeletal: Negative for joint pain and myalgias.   Skin: Negative for itching and rash.   Neurological: Negative for dizziness, sensory change, focal weakness and headaches.   Endo/Heme/Allergies: Negative for environmental allergies.   Psychiatric/Behavioral: Negative for depression. The patient is not nervous/anxious.         Past Medical, Surgical and Family History:    Past Medical History:   Diagnosis Date   • Allergy    • GERD (gastroesophageal reflux disease)    • Hepatitis 2006    patient denies; never been told; not sure where it came from   • Hiatal hernia    • High cholesterol    • Hypertension      Past Surgical History:   Procedure Laterality Date   • [facility] CARDIAC CATH  06/05/2019    non obstructive disease.   • TENDON REPAIR Right 2008     Family History   Problem Relation Age of Onset   • Hypertension Mother    • Diabetes Father    • Hypertension Sister    • Hypertension Brother    • Diabetes Brother    • Hypertension Son         Social History:    Social History     Tobacco Use   • Smoking status: Former Smoker     Packs/day: 0.25     Years: 3.00     Pack years: 0.75     Types: Cigarettes     Start date: 1980      "Quit date:      Years since quittin.1   • Smokeless tobacco: Never Used   Substance Use Topics   • Alcohol use: Yes     Comment: 2-3 drinks/day    • Drug use: No        Medications and Allergies:     Current Outpatient Medications   Medication Sig Dispense Refill   • ezetimibe (ZETIA) 10 MG Tab Take 1 tablet by mouth every day. 90 tablet 3   • atorvastatin (LIPITOR) 80 MG tablet Take 1 Tab by mouth every evening. For further refills please contact new cardiologist. Thank you 90 Tab 1   • omeprazole (PRILOSEC) 40 MG delayed-release capsule      • aspirin (ASA) 81 MG Chew Tab chewable tablet Take 1 Tab by mouth every day. 100 Tab 3   • amlodipine-benazepril (LOTREL) 10-20 MG per capsule Take 1 Cap by mouth every day.     • Probiotic Product (PROBIOTIC FORMULA PO) Take  by mouth.     • Icosapent Ethyl 1 g Cap Take 2 Tabs by mouth 2 Times a Day. 360 Cap 3   • therapeutic multivitamin-minerals (THERAGRAN-M) Tab Take 1 Tab by mouth every day.       No current facility-administered medications for this visit.        Allergies   Allergen Reactions   • Other Food Itching     Shell fish; Pt states \"at times skin rash and/or puffy/itchy eyes\"       Vitals:    /67 (BP Location: Left arm, Patient Position: Sitting, BP Cuff Size: Adult)   Pulse 78   Temp 36.7 °C (98 °F)   Resp 14   Ht 1.727 m (5' 8\")   Wt 81 kg (178 lb 9.2 oz)   SpO2 96%  Body mass index is 27.15 kg/m².    Physical Exam:     Physical Exam   Constitutional: He is well-developed, well-nourished, and in no distress. No distress.   HENT:   Head: Normocephalic and atraumatic.   Right Ear: External ear normal.   Left Ear: External ear normal.   Eyes: Conjunctivae and EOM are normal. Right eye exhibits no discharge. Left eye exhibits no discharge.   Neck: No thyromegaly present.   Cardiovascular: Normal rate, regular rhythm, normal heart sounds and intact distal pulses.   No murmur heard.  Pulmonary/Chest: Effort normal and breath sounds normal. No " respiratory distress. He has no wheezes. He has no rales.   Abdominal: Soft. Bowel sounds are normal.   Musculoskeletal:         General: No deformity or edema. Normal range of motion.      Cervical back: Neck supple.   Neurological: He is alert. Gait normal.   Skin: Skin is warm. No rash noted. He is not diaphoretic.   Psychiatric: Mood, affect and judgment normal.        Labs:  I reviewed with patient recent labs lipid panel resulted on 10/29/2020    Assessment/Plan:      1. Dyslipidemia  Improving, continue same medication regimen.  Recheck labs.    - ezetimibe (ZETIA) 10 MG Tab; Take 1 tablet by mouth every day.  Dispense: 90 tablet; Refill: 3  - Comp Metabolic Panel; Future  - Lipid Profile; Future    2. Vitamin D deficiency  Recheck vitamin D to assess need for supplementation.    - VITAMIN D,25 HYDROXY; Future    3. S/P cardiac cath  Stable, continue aspirin and statin.    4. Essential hypertension  Stable, controlled with current regimen.  Continue same medication regimen.    5. Coronary artery calcification  Stable, continue aspirin and statin.    - Lipid Profile; Future       Discussed with patient diagnosis, management options, and risk, benefits and alternative to treatment plan agreed upon.    Please note that this dictation was created using voice recognition software. I have made every reasonable attempt to correct obvious errors, but I expect that there are errors of grammar and possibly content that I did not discover before finalizing the note.    Follow up in 4 months for lab follow-up.

## 2021-02-17 NOTE — ASSESSMENT & PLAN NOTE
This is a chronic health problem for this patient.  Currently taking atorvastatin 80 mg once daily and Zetia 10 mg daily.  Denies any side effects with this medications.    Lab Results   Component Value Date/Time    CHOLSTRLTOT 128 10/29/2020 0635    TRIGLYCERIDE 184 (H) 10/29/2020 0635    HDL 48 10/29/2020 0635    LDL Comment 07/15/2020 0501

## 2021-02-19 PROBLEM — Z00.00 HEALTHCARE MAINTENANCE: Status: ACTIVE | Noted: 2021-02-19

## 2021-03-05 ENCOUNTER — HOSPITAL ENCOUNTER (OUTPATIENT)
Dept: LAB | Facility: MEDICAL CENTER | Age: 61
End: 2021-03-05
Attending: FAMILY MEDICINE
Payer: COMMERCIAL

## 2021-03-05 ENCOUNTER — OFFICE VISIT (OUTPATIENT)
Dept: MEDICAL GROUP | Facility: MEDICAL CENTER | Age: 61
End: 2021-03-05
Payer: COMMERCIAL

## 2021-03-05 VITALS
HEART RATE: 67 BPM | WEIGHT: 190.7 LBS | DIASTOLIC BLOOD PRESSURE: 76 MMHG | TEMPERATURE: 97.7 F | RESPIRATION RATE: 16 BRPM | HEIGHT: 68 IN | OXYGEN SATURATION: 95 % | BODY MASS INDEX: 28.9 KG/M2 | SYSTOLIC BLOOD PRESSURE: 116 MMHG

## 2021-03-05 DIAGNOSIS — R10.12 BILATERAL UPPER ABDOMINAL DISCOMFORT: ICD-10-CM

## 2021-03-05 DIAGNOSIS — R10.11 BILATERAL UPPER ABDOMINAL DISCOMFORT: ICD-10-CM

## 2021-03-05 DIAGNOSIS — E55.9 VITAMIN D DEFICIENCY: ICD-10-CM

## 2021-03-05 DIAGNOSIS — E78.5 DYSLIPIDEMIA: ICD-10-CM

## 2021-03-05 DIAGNOSIS — K44.9 HIATAL HERNIA: ICD-10-CM

## 2021-03-05 LAB
25(OH)D3 SERPL-MCNC: 34 NG/ML (ref 30–100)
ALBUMIN SERPL BCP-MCNC: 4.7 G/DL (ref 3.2–4.9)
ALBUMIN/GLOB SERPL: 1.9 G/DL
ALP SERPL-CCNC: 74 U/L (ref 30–99)
ALT SERPL-CCNC: 23 U/L (ref 2–50)
AMYLASE SERPL-CCNC: 75 U/L (ref 25–125)
ANION GAP SERPL CALC-SCNC: 11 MMOL/L (ref 7–16)
AST SERPL-CCNC: 18 U/L (ref 12–45)
BILIRUB SERPL-MCNC: 0.8 MG/DL (ref 0.1–1.5)
BUN SERPL-MCNC: 15 MG/DL (ref 8–22)
CALCIUM SERPL-MCNC: 9.4 MG/DL (ref 8.4–10.2)
CHLORIDE SERPL-SCNC: 106 MMOL/L (ref 96–112)
CHOLEST SERPL-MCNC: 112 MG/DL (ref 100–199)
CO2 SERPL-SCNC: 24 MMOL/L (ref 20–33)
CREAT SERPL-MCNC: 0.82 MG/DL (ref 0.5–1.4)
FASTING STATUS PATIENT QL REPORTED: NORMAL
GLOBULIN SER CALC-MCNC: 2.5 G/DL (ref 1.9–3.5)
GLUCOSE SERPL-MCNC: 117 MG/DL (ref 65–99)
HDLC SERPL-MCNC: 48 MG/DL
LDLC SERPL CALC-MCNC: 40 MG/DL
LIPASE SERPL-CCNC: 37 U/L (ref 7–58)
POTASSIUM SERPL-SCNC: 4.2 MMOL/L (ref 3.6–5.5)
PROT SERPL-MCNC: 7.2 G/DL (ref 6–8.2)
SODIUM SERPL-SCNC: 141 MMOL/L (ref 135–145)
TRIGL SERPL-MCNC: 120 MG/DL (ref 0–149)

## 2021-03-05 PROCEDURE — 83690 ASSAY OF LIPASE: CPT

## 2021-03-05 PROCEDURE — 80053 COMPREHEN METABOLIC PANEL: CPT

## 2021-03-05 PROCEDURE — 82150 ASSAY OF AMYLASE: CPT

## 2021-03-05 PROCEDURE — 82306 VITAMIN D 25 HYDROXY: CPT

## 2021-03-05 PROCEDURE — 80061 LIPID PANEL: CPT

## 2021-03-05 PROCEDURE — 36415 COLL VENOUS BLD VENIPUNCTURE: CPT

## 2021-03-05 PROCEDURE — 99214 OFFICE O/P EST MOD 30 MIN: CPT | Performed by: FAMILY MEDICINE

## 2021-03-05 ASSESSMENT — ENCOUNTER SYMPTOMS
COUGH: 0
WHEEZING: 0
HEMOPTYSIS: 0
HEARTBURN: 1
FEVER: 0
VOMITING: 0
DIARRHEA: 0
CHILLS: 0
BLOOD IN STOOL: 0
CONSTIPATION: 0
NAUSEA: 0
ABDOMINAL PAIN: 1

## 2021-03-05 ASSESSMENT — FIBROSIS 4 INDEX: FIB4 SCORE: 1.22

## 2021-03-05 NOTE — PROGRESS NOTES
FAMILY MEDICINE VISIT                                                               Chief complaint::Diagnoses of Bilateral upper abdominal discomfort and Hiatal hernia were pertinent to this visit.    History of present illness: Víctor Hollis is a 60 y.o. male who presented for upper abdominal discomfort.    Patient reports that he has been having upper abdominal discomfort, more on left upper quadrant since 3 weeks.  He is taking omeprazole 40 mg which helps with the symptoms.  He reports that sometimes he has to take omeprazole in the afternoon.  He has history of hiatal hernia which was evaluated many years ago.  Denies any nausea, vomitinG, constipation, diarrhea.  He does drink alcohol 1-2 drinks a day.  Does not smoke.      Review of systems:     Review of Systems   Constitutional: Negative for chills, fever and malaise/fatigue.   Respiratory: Negative for cough, hemoptysis and wheezing.    Cardiovascular: Negative for chest pain and leg swelling.   Gastrointestinal: Positive for abdominal pain and heartburn. Negative for blood in stool, constipation, diarrhea, nausea and vomiting.        Past Medical, Surgical and Family History:    Past Medical History:   Diagnosis Date   • Allergy    • GERD (gastroesophageal reflux disease)    • Hepatitis 2006    patient denies; never been told; not sure where it came from   • Hiatal hernia    • High cholesterol    • Hypertension      Past Surgical History:   Procedure Laterality Date   • [facility] CARDIAC CATH  06/05/2019    non obstructive disease.   • TENDON REPAIR Right 2008     Family History   Problem Relation Age of Onset   • Hypertension Mother    • Diabetes Father    • Hypertension Sister    • Hypertension Brother    • Diabetes Brother    • Hypertension Son         Social History:    Social History     Tobacco Use   • Smoking status: Former Smoker     Packs/day: 0.25     Years: 3.00     Pack years: 0.75     Types: Cigarettes     Start date: 1980     Quit date:  "1983     Years since quittin.2   • Smokeless tobacco: Never Used   Substance Use Topics   • Alcohol use: Yes     Comment: 2-3 drinks/day    • Drug use: No        Medications and Allergies:     Current Outpatient Medications   Medication Sig Dispense Refill   • ezetimibe (ZETIA) 10 MG Tab Take 1 tablet by mouth every day. 90 tablet 3   • atorvastatin (LIPITOR) 80 MG tablet Take 1 Tab by mouth every evening. For further refills please contact new cardiologist. Thank you 90 Tab 1   • omeprazole (PRILOSEC) 40 MG delayed-release capsule      • Icosapent Ethyl 1 g Cap Take 2 Tabs by mouth 2 Times a Day. 360 Cap 3   • aspirin (ASA) 81 MG Chew Tab chewable tablet Take 1 Tab by mouth every day. 100 Tab 3   • amlodipine-benazepril (LOTREL) 10-20 MG per capsule Take 1 Cap by mouth every day.     • therapeutic multivitamin-minerals (THERAGRAN-M) Tab Take 1 Tab by mouth every day.     • Probiotic Product (PROBIOTIC FORMULA PO) Take  by mouth.       No current facility-administered medications for this visit.        Allergies   Allergen Reactions   • Other Food Itching     Shell fish; Pt states \"at times skin rash and/or puffy/itchy eyes\"       Vitals:    /76 (BP Location: Left arm, Patient Position: Sitting, BP Cuff Size: Adult)   Pulse 67   Temp 36.5 °C (97.7 °F) (Temporal)   Resp 16   Ht 1.727 m (5' 8\")   Wt 86.5 kg (190 lb 11.2 oz)   SpO2 95%  Body mass index is 29 kg/m².    Physical Exam:     Physical Exam   Constitutional: He is well-developed, well-nourished, and in no distress. No distress.   HENT:   Head: Normocephalic and atraumatic.   Eyes: Conjunctivae are normal.   Cardiovascular: Normal rate.   Pulmonary/Chest: Effort normal. No respiratory distress.   Abdominal: Soft. Bowel sounds are normal. He exhibits no distension and no mass. There is abdominal tenderness. There is no rebound and no guarding.   Tenderness on palpation at left upper quadrant.   Musculoskeletal:         General: No deformity or " edema.      Cervical back: Neck supple.   Neurological: He is alert. Gait normal.   Psychiatric: Mood, affect and judgment normal.          Assessment/Plan:    1. Bilateral upper abdominal discomfort  Differential diagnosis gastritis, worsening of hiatal hernia versus pancreatitis versus gallbladder disease.  Ultrasound in 2019 showed cholelithiasis.  Check amylase and lipase and ultrasound abdomen.  Advised to do other labs which were ordered at last visit including CMP, lipid panel, vitamin D.    - AMYLASE; Future  - LIPASE; Future  - US-ABDOMEN COMPLETE SURVEY; Future    2. Hiatal hernia  Current symptoms could be worsening of hiatal hernia.  Rule out other disorder including pancreatitis, gallbladder disease with imaging and labs.    Discussed with patient diagnosis, management options, and risk, benefits and alternative to treatment plan agreed upon.    Please note that this dictation was created using voice recognition software. I have made every reasonable attempt to correct obvious errors, but I expect that there are errors of grammar and possibly content that I did not discover before finalizing the note.    Follow up after labs and imaging.

## 2021-03-15 DIAGNOSIS — Z23 NEED FOR VACCINATION: ICD-10-CM

## 2021-03-16 ENCOUNTER — HOSPITAL ENCOUNTER (OUTPATIENT)
Dept: RADIOLOGY | Facility: MEDICAL CENTER | Age: 61
End: 2021-03-16
Attending: FAMILY MEDICINE
Payer: COMMERCIAL

## 2021-03-16 DIAGNOSIS — R10.12 BILATERAL UPPER ABDOMINAL DISCOMFORT: ICD-10-CM

## 2021-03-16 DIAGNOSIS — R10.11 BILATERAL UPPER ABDOMINAL DISCOMFORT: ICD-10-CM

## 2021-03-16 PROCEDURE — 76700 US EXAM ABDOM COMPLETE: CPT

## 2021-03-19 ENCOUNTER — IMMUNIZATION (OUTPATIENT)
Dept: FAMILY PLANNING/WOMEN'S HEALTH CLINIC | Facility: IMMUNIZATION CENTER | Age: 61
End: 2021-03-19
Attending: INTERNAL MEDICINE
Payer: COMMERCIAL

## 2021-03-19 DIAGNOSIS — Z23 NEED FOR VACCINATION: ICD-10-CM

## 2021-03-19 DIAGNOSIS — Z23 ENCOUNTER FOR VACCINATION: Primary | ICD-10-CM

## 2021-03-19 PROCEDURE — 0011A MODERNA SARS-COV-2 VACCINE: CPT

## 2021-03-19 PROCEDURE — 91301 MODERNA SARS-COV-2 VACCINE: CPT

## 2021-04-17 ENCOUNTER — IMMUNIZATION (OUTPATIENT)
Dept: FAMILY PLANNING/WOMEN'S HEALTH CLINIC | Facility: IMMUNIZATION CENTER | Age: 61
End: 2021-04-17
Attending: INTERNAL MEDICINE
Payer: COMMERCIAL

## 2021-04-17 DIAGNOSIS — Z23 ENCOUNTER FOR VACCINATION: Primary | ICD-10-CM

## 2021-04-17 PROCEDURE — 91301 MODERNA SARS-COV-2 VACCINE: CPT

## 2021-04-17 PROCEDURE — 0012A MODERNA SARS-COV-2 VACCINE: CPT

## 2021-08-20 ENCOUNTER — PATIENT MESSAGE (OUTPATIENT)
Dept: MEDICAL GROUP | Facility: MEDICAL CENTER | Age: 61
End: 2021-08-20

## 2021-08-20 RX ORDER — OMEPRAZOLE 40 MG/1
40 CAPSULE, DELAYED RELEASE ORAL DAILY
Qty: 90 CAPSULE | Refills: 3 | Status: SHIPPED | OUTPATIENT
Start: 2021-08-20 | End: 2022-06-02

## 2021-09-19 DIAGNOSIS — E78.5 DYSLIPIDEMIA: ICD-10-CM

## 2021-09-20 ENCOUNTER — TELEPHONE (OUTPATIENT)
Dept: CARDIOLOGY | Facility: MEDICAL CENTER | Age: 61
End: 2021-09-20

## 2021-09-20 RX ORDER — ATORVASTATIN CALCIUM 80 MG/1
TABLET, FILM COATED ORAL
Qty: 90 TABLET | Refills: 0 | Status: SHIPPED | OUTPATIENT
Start: 2021-09-20 | End: 2021-09-27 | Stop reason: SDUPTHER

## 2021-09-20 NOTE — TELEPHONE ENCOUNTER
----- Message from Angelica Goldman R.N. sent at 9/20/2021  9:16 AM PDT -----  Pt due for f/u. Please contact to schedule appt.  Thank you!

## 2021-09-27 ENCOUNTER — OFFICE VISIT (OUTPATIENT)
Dept: CARDIOLOGY | Facility: MEDICAL CENTER | Age: 61
End: 2021-09-27
Payer: COMMERCIAL

## 2021-09-27 VITALS
SYSTOLIC BLOOD PRESSURE: 124 MMHG | OXYGEN SATURATION: 99 % | BODY MASS INDEX: 29.7 KG/M2 | HEIGHT: 68 IN | DIASTOLIC BLOOD PRESSURE: 70 MMHG | WEIGHT: 196 LBS | HEART RATE: 74 BPM | RESPIRATION RATE: 12 BRPM

## 2021-09-27 DIAGNOSIS — E78.5 DYSLIPIDEMIA: ICD-10-CM

## 2021-09-27 DIAGNOSIS — I25.10 CORONARY ARTERY CALCIFICATION: ICD-10-CM

## 2021-09-27 DIAGNOSIS — I25.84 CORONARY ARTERY CALCIFICATION: ICD-10-CM

## 2021-09-27 DIAGNOSIS — E88.810 METABOLIC SYNDROME: ICD-10-CM

## 2021-09-27 DIAGNOSIS — Z98.890 S/P CARDIAC CATH: ICD-10-CM

## 2021-09-27 DIAGNOSIS — I10 ESSENTIAL HYPERTENSION: ICD-10-CM

## 2021-09-27 PROCEDURE — 99214 OFFICE O/P EST MOD 30 MIN: CPT | Performed by: NURSE PRACTITIONER

## 2021-09-27 RX ORDER — ATORVASTATIN CALCIUM 80 MG/1
80 TABLET, FILM COATED ORAL EVERY EVENING
Qty: 90 TABLET | Refills: 3 | Status: SHIPPED | OUTPATIENT
Start: 2021-09-27 | End: 2022-07-18

## 2021-09-27 ASSESSMENT — ENCOUNTER SYMPTOMS
LOSS OF CONSCIOUSNESS: 0
DIZZINESS: 0
COUGH: 0
ABDOMINAL PAIN: 0
MYALGIAS: 0
BRUISES/BLEEDS EASILY: 0
CHILLS: 0
PALPITATIONS: 0
ORTHOPNEA: 0
FEVER: 0
SHORTNESS OF BREATH: 0
INSOMNIA: 0
NAUSEA: 0
PND: 0
HEADACHES: 0

## 2021-09-27 NOTE — PROGRESS NOTES
Chief Complaint   Patient presents with   • Follow-Up   • Abnormal Cardiac Function Testing   • HTN (Controlled)   • Hyperlipidemia       Subjective     Art Jameel Hollis is a 61 y.o. male who presents today for annual follow-up of elevated CTCS, HTN and hyperlipidemia.    Salo is a 61 year old male with history of elevated CTCS (1300+ in May 2019), which led to coronary angiogram in June 2019, which showed nonobstructive CAD, LVEF 65%. He is treated with statin and Zetia. He also has hypertension, treated, and was last seen by Dr. Olivares in July 2020.    He is here today for annual follow-up. Generally, he is doing very well. He denies any chest pain, pressure or discomfort; no symptomatic palpitations; no shortness of breath, orthopnea or PND; no dizziness, lightheadedness or syncope; no LE edema. He sleeps well. He exercise (runs, bikes) 5 times per week; he tries to watch his diet. He does have 1-2 EtOH drinks per day. He does not smoke.    Past Medical History:   Diagnosis Date   • Allergy    • Elevated coronary artery calcium score     Treated with statin and Zetia. June 2019: Coronary angiogram with nonobstructive CAD, LVEF 65%   • GERD (gastroesophageal reflux disease)    • Hiatal hernia    • Hyperlipidemia    • Hypertension      Past Surgical History:   Procedure Laterality Date   • [facility] CARDIAC CATH  06/05/2019    non obstructive disease.   • TENDON REPAIR Right 2008     Family History   Problem Relation Age of Onset   • Hypertension Mother    • Diabetes Father    • Hypertension Sister    • Hypertension Brother    • Diabetes Brother    • Hypertension Son      Social History     Socioeconomic History   • Marital status:      Spouse name: Not on file   • Number of children: Not on file   • Years of education: Not on file   • Highest education level: Bachelor's degree (e.g., BA, AB, BS)   Occupational History   • Not on file   Tobacco Use   • Smoking status: Former Smoker     Packs/day: 0.25     Years:  "3.00     Pack years: 0.75     Types: Cigarettes     Start date:      Quit date:      Years since quittin.7   • Smokeless tobacco: Never Used   Substance and Sexual Activity   • Alcohol use: Yes     Comment: 2-3 drinks/day    • Drug use: No   • Sexual activity: Yes     Partners: Female   Other Topics Concern   • Not on file   Social History Narrative   • Not on file     Social Determinants of Health     Financial Resource Strain: Low Risk    • Difficulty of Paying Living Expenses: Not hard at all   Food Insecurity: No Food Insecurity   • Worried About Running Out of Food in the Last Year: Never true   • Ran Out of Food in the Last Year: Never true   Transportation Needs: No Transportation Needs   • Lack of Transportation (Medical): No   • Lack of Transportation (Non-Medical): No   Physical Activity: Sufficiently Active   • Days of Exercise per Week: 5 days   • Minutes of Exercise per Session: 40 min   Stress: No Stress Concern Present   • Feeling of Stress : Not at all   Social Connections: Moderately Integrated   • Frequency of Communication with Friends and Family: More than three times a week   • Frequency of Social Gatherings with Friends and Family: Twice a week   • Attends Baptism Services: Never   • Active Member of Clubs or Organizations: Yes   • Attends Club or Organization Meetings: More than 4 times per year   • Marital Status:    Intimate Partner Violence:    • Fear of Current or Ex-Partner:    • Emotionally Abused:    • Physically Abused:    • Sexually Abused:      Allergies   Allergen Reactions   • Other Food Itching     Shell fish; Pt states \"at times skin rash and/or puffy/itchy eyes\"     Outpatient Encounter Medications as of 2021   Medication Sig Dispense Refill   • atorvastatin (LIPITOR) 80 MG tablet Take 1 Tablet by mouth every evening. 90 Tablet 3   • omeprazole (PRILOSEC) 40 MG delayed-release capsule Take 1 Capsule by mouth every day. 90 Capsule 3   • ezetimibe " "(ZETIA) 10 MG Tab Take 1 tablet by mouth every day. 90 tablet 3   • aspirin (ASA) 81 MG Chew Tab chewable tablet Take 1 Tab by mouth every day. 100 Tab 3   • amlodipine-benazepril (LOTREL) 10-20 MG per capsule Take 1 Cap by mouth every day.     • therapeutic multivitamin-minerals (THERAGRAN-M) Tab Take 1 Tab by mouth every day.     • Probiotic Product (PROBIOTIC FORMULA PO) Take  by mouth.     • [DISCONTINUED] atorvastatin (LIPITOR) 80 MG tablet TAKE 1 TABLET BY MOUTH AT  BEDTIME 90 Tablet 0   • [DISCONTINUED] Icosapent Ethyl 1 g Cap Take 2 Tabs by mouth 2 Times a Day. 360 Cap 3     No facility-administered encounter medications on file as of 9/27/2021.     Review of Systems   Constitutional: Negative for chills and fever.   HENT: Negative for congestion.    Respiratory: Negative for cough and shortness of breath.    Cardiovascular: Negative for chest pain, palpitations, orthopnea, leg swelling and PND.   Gastrointestinal: Negative for abdominal pain and nausea.   Musculoskeletal: Negative for myalgias.   Skin: Negative for rash.   Neurological: Negative for dizziness, loss of consciousness and headaches.   Endo/Heme/Allergies: Does not bruise/bleed easily.   Psychiatric/Behavioral: The patient does not have insomnia.               Objective     /70 (BP Location: Left arm, Patient Position: Sitting, BP Cuff Size: Adult)   Pulse 74   Resp 12   Ht 1.727 m (5' 8\")   Wt 88.9 kg (196 lb)   SpO2 99%   BMI 29.80 kg/m²     Physical Exam  Constitutional:       Appearance: He is well-developed.   HENT:      Head: Normocephalic.   Neck:      Vascular: No JVD.   Cardiovascular:      Rate and Rhythm: Normal rate and regular rhythm.      Heart sounds: Normal heart sounds.   Pulmonary:      Effort: Pulmonary effort is normal. No respiratory distress.      Breath sounds: Normal breath sounds. No wheezing or rales.   Abdominal:      General: Bowel sounds are normal. There is no distension.      Palpations: Abdomen is " soft.      Tenderness: There is no abdominal tenderness.   Musculoskeletal:         General: Normal range of motion.      Cervical back: Normal range of motion and neck supple.   Skin:     General: Skin is warm and dry.      Findings: No rash.   Neurological:      Mental Status: He is alert and oriented to person, place, and time.       POSTPROCEDURE DIAGNOSES OF CORONARY ANGIOGRAM OF 6/5/2019:  1.  Nonobstructive coronary artery disease.    2.  Normal left ventricular systolic function with ejection fraction of 65%.    3.  Elevated left ventricular end diastolic pressure.       FINDINGS OF CTCS OF 5/2/2019:  Coronary calcification:  LMA - 0.0  LCX - 41.5  LAD - 395.0  RCA - 928.5  PDA - 0.0  Total Calcium Score: 1365    Lab Results   Component Value Date/Time    CHOLSTRLTOT 112 03/05/2021 09:38 AM    LDL 40 03/05/2021 09:38 AM    HDL 48 03/05/2021 09:38 AM    TRIGLYCERIDE 120 03/05/2021 09:38 AM       Lab Results   Component Value Date/Time    SODIUM 141 03/05/2021 09:38 AM    POTASSIUM 4.2 03/05/2021 09:38 AM    CHLORIDE 106 03/05/2021 09:38 AM    CO2 24 03/05/2021 09:38 AM    GLUCOSE 117 (H) 03/05/2021 09:38 AM    BUN 15 03/05/2021 09:38 AM    CREATININE 0.82 03/05/2021 09:38 AM    BUNCREATRAT 19 07/15/2020 05:07 AM     Lab Results   Component Value Date/Time    ALKPHOSPHAT 74 03/05/2021 09:38 AM    ASTSGOT 18 03/05/2021 09:38 AM    ALTSGPT 23 03/05/2021 09:38 AM    TBILIRUBIN 0.8 03/05/2021 09:38 AM        Assessment & Plan     1. Coronary artery calcification     2. S/P cardiac cath     3. Essential hypertension     4. Dyslipidemia  atorvastatin (LIPITOR) 80 MG tablet   5. Metabolic syndrome         Medical Decision Making: Today's Assessment/Status/Plan:        1. Elevated CTCS in May 2019, with coronary angiogram with nonobstructive CAD, normal LVEF. No angina or shortness of breath.    2. Hypertension, treated with Lotrel 10/20mg once daily. BP is well controlled.    3. Hyperlipidemia, treated with Lipitor  80mg and Zetia 10mg. Recent LDL was 40 (at goal).    4. Metabolic syndrome, stable. To monitor over time.    He is doing well. To continue with lifestyle modifications. Same medications, which are renewed for a year. FU with Dr. Olivares annually, sooner if clinical condition changes.

## 2022-01-08 ENCOUNTER — OFFICE VISIT (OUTPATIENT)
Dept: URGENT CARE | Facility: CLINIC | Age: 62
End: 2022-01-08
Payer: COMMERCIAL

## 2022-01-08 ENCOUNTER — APPOINTMENT (OUTPATIENT)
Dept: RADIOLOGY | Facility: IMAGING CENTER | Age: 62
End: 2022-01-08
Attending: PHYSICIAN ASSISTANT
Payer: COMMERCIAL

## 2022-01-08 VITALS
TEMPERATURE: 97.8 F | RESPIRATION RATE: 12 BRPM | BODY MASS INDEX: 28.04 KG/M2 | HEART RATE: 71 BPM | HEIGHT: 68 IN | DIASTOLIC BLOOD PRESSURE: 70 MMHG | WEIGHT: 185 LBS | SYSTOLIC BLOOD PRESSURE: 128 MMHG | OXYGEN SATURATION: 97 %

## 2022-01-08 DIAGNOSIS — M79.672 LEFT FOOT PAIN: ICD-10-CM

## 2022-01-08 PROCEDURE — 73630 X-RAY EXAM OF FOOT: CPT | Mod: TC,LT | Performed by: RADIOLOGY

## 2022-01-08 PROCEDURE — 99214 OFFICE O/P EST MOD 30 MIN: CPT | Performed by: PHYSICIAN ASSISTANT

## 2022-01-08 RX ORDER — METHYLPREDNISOLONE 4 MG/1
TABLET ORAL
Qty: 21 TABLET | Refills: 0 | Status: SHIPPED | OUTPATIENT
Start: 2022-01-08 | End: 2022-03-30

## 2022-01-08 ASSESSMENT — ENCOUNTER SYMPTOMS
WHEEZING: 0
COUGH: 0
CHILLS: 0
SENSORY CHANGE: 0
VOMITING: 0
NAUSEA: 0
WEAKNESS: 0
JOINT SWELLING: 1
SHORTNESS OF BREATH: 0
TINGLING: 0
FEVER: 0
FOCAL WEAKNESS: 0
ARTHRALGIAS: 1
NUMBNESS: 0
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
## 2022-02-02 ENCOUNTER — APPOINTMENT (RX ONLY)
Dept: URBAN - METROPOLITAN AREA CLINIC 4 | Facility: CLINIC | Age: 62
Setting detail: DERMATOLOGY
End: 2022-02-02

## 2022-02-02 DIAGNOSIS — L81.4 OTHER MELANIN HYPERPIGMENTATION: ICD-10-CM

## 2022-02-02 DIAGNOSIS — Z71.89 OTHER SPECIFIED COUNSELING: ICD-10-CM

## 2022-02-02 DIAGNOSIS — L73.8 OTHER SPECIFIED FOLLICULAR DISORDERS: ICD-10-CM

## 2022-02-02 DIAGNOSIS — D18.0 HEMANGIOMA: ICD-10-CM

## 2022-02-02 DIAGNOSIS — L82.1 OTHER SEBORRHEIC KERATOSIS: ICD-10-CM

## 2022-02-02 DIAGNOSIS — D22 MELANOCYTIC NEVI: ICD-10-CM

## 2022-02-02 PROBLEM — D22.61 MELANOCYTIC NEVI OF RIGHT UPPER LIMB, INCLUDING SHOULDER: Status: ACTIVE | Noted: 2022-02-02

## 2022-02-02 PROBLEM — D22.71 MELANOCYTIC NEVI OF RIGHT LOWER LIMB, INCLUDING HIP: Status: ACTIVE | Noted: 2022-02-02

## 2022-02-02 PROBLEM — D22.4 MELANOCYTIC NEVI OF SCALP AND NECK: Status: ACTIVE | Noted: 2022-02-02

## 2022-02-02 PROBLEM — D18.01 HEMANGIOMA OF SKIN AND SUBCUTANEOUS TISSUE: Status: ACTIVE | Noted: 2022-02-02

## 2022-02-02 PROBLEM — D22.5 MELANOCYTIC NEVI OF TRUNK: Status: ACTIVE | Noted: 2022-02-02

## 2022-02-02 PROBLEM — D22.62 MELANOCYTIC NEVI OF LEFT UPPER LIMB, INCLUDING SHOULDER: Status: ACTIVE | Noted: 2022-02-02

## 2022-02-02 PROBLEM — D22.39 MELANOCYTIC NEVI OF OTHER PARTS OF FACE: Status: ACTIVE | Noted: 2022-02-02

## 2022-02-02 PROBLEM — D22.72 MELANOCYTIC NEVI OF LEFT LOWER LIMB, INCLUDING HIP: Status: ACTIVE | Noted: 2022-02-02

## 2022-02-02 PROCEDURE — 99213 OFFICE O/P EST LOW 20 MIN: CPT

## 2022-02-02 PROCEDURE — ? COUNSELING

## 2022-02-02 PROCEDURE — ? SUNSCREEN TREATMENT REGIMEN

## 2022-02-02 ASSESSMENT — LOCATION DETAILED DESCRIPTION DERM
LOCATION DETAILED: LEFT POSTERIOR SHOULDER
LOCATION DETAILED: RIGHT MEDIAL INFERIOR CHEST
LOCATION DETAILED: RIGHT INFERIOR MEDIAL FOREHEAD
LOCATION DETAILED: RIGHT POSTERIOR SHOULDER
LOCATION DETAILED: LEFT PROXIMAL CALF
LOCATION DETAILED: LEFT SUPERIOR LATERAL UPPER BACK
LOCATION DETAILED: LEFT SUPERIOR PARIETAL SCALP
LOCATION DETAILED: LEFT LATERAL SUPERIOR CHEST
LOCATION DETAILED: RIGHT LATERAL SUPERIOR CHEST
LOCATION DETAILED: RIGHT ANTERIOR PROXIMAL THIGH
LOCATION DETAILED: RIGHT DISTAL POSTERIOR THIGH
LOCATION DETAILED: RIGHT VENTRAL DISTAL FOREARM
LOCATION DETAILED: LEFT VENTRAL PROXIMAL FOREARM
LOCATION DETAILED: LEFT DISTAL POSTERIOR THIGH
LOCATION DETAILED: RIGHT MEDIAL TRAPEZIAL NECK
LOCATION DETAILED: LEFT VENTRAL DISTAL FOREARM
LOCATION DETAILED: LEFT MEDIAL SUPERIOR CHEST
LOCATION DETAILED: RIGHT CENTRAL MALAR CHEEK
LOCATION DETAILED: LEFT PROXIMAL DORSAL FOREARM
LOCATION DETAILED: RIGHT VENTRAL PROXIMAL FOREARM
LOCATION DETAILED: LEFT INFERIOR CENTRAL MALAR CHEEK
LOCATION DETAILED: LEFT ANTERIOR PROXIMAL THIGH
LOCATION DETAILED: RIGHT DISTAL POSTERIOR UPPER ARM
LOCATION DETAILED: LEFT MEDIAL FRONTAL SCALP
LOCATION DETAILED: RIGHT PROXIMAL CALF
LOCATION DETAILED: LEFT PROXIMAL POSTERIOR UPPER ARM
LOCATION DETAILED: RIGHT PROXIMAL DORSAL FOREARM
LOCATION DETAILED: LEFT LATERAL EYEBROW
LOCATION DETAILED: LEFT ANTERIOR DISTAL UPPER ARM
LOCATION DETAILED: RIGHT ANTERIOR DISTAL UPPER ARM
LOCATION DETAILED: INFERIOR MID FOREHEAD

## 2022-02-02 ASSESSMENT — LOCATION SIMPLE DESCRIPTION DERM
LOCATION SIMPLE: RIGHT THIGH
LOCATION SIMPLE: LEFT SCALP
LOCATION SIMPLE: RIGHT UPPER ARM
LOCATION SIMPLE: LEFT FOREARM
LOCATION SIMPLE: LEFT UPPER BACK
LOCATION SIMPLE: SCALP
LOCATION SIMPLE: LEFT POSTERIOR THIGH
LOCATION SIMPLE: LEFT SHOULDER
LOCATION SIMPLE: LEFT CHEEK
LOCATION SIMPLE: LEFT UPPER ARM
LOCATION SIMPLE: RIGHT POSTERIOR THIGH
LOCATION SIMPLE: RIGHT FOREARM
LOCATION SIMPLE: LEFT CALF
LOCATION SIMPLE: RIGHT CHEEK
LOCATION SIMPLE: LEFT THIGH
LOCATION SIMPLE: LEFT EYEBROW
LOCATION SIMPLE: RIGHT CALF
LOCATION SIMPLE: INFERIOR FOREHEAD
LOCATION SIMPLE: CHEST
LOCATION SIMPLE: RIGHT SHOULDER
LOCATION SIMPLE: POSTERIOR NECK
LOCATION SIMPLE: RIGHT FOREHEAD

## 2022-02-02 ASSESSMENT — LOCATION ZONE DERM
LOCATION ZONE: ARM
LOCATION ZONE: NECK
LOCATION ZONE: FACE
LOCATION ZONE: TRUNK
LOCATION ZONE: SCALP
LOCATION ZONE: LEG

## 2022-02-10 DIAGNOSIS — E78.5 DYSLIPIDEMIA: ICD-10-CM

## 2022-02-11 RX ORDER — EZETIMIBE 10 MG/1
TABLET ORAL
Qty: 90 TABLET | Refills: 0 | Status: SHIPPED | OUTPATIENT
Start: 2022-02-11 | End: 2022-05-03

## 2022-03-23 ENCOUNTER — PATIENT MESSAGE (OUTPATIENT)
Dept: MEDICAL GROUP | Facility: MEDICAL CENTER | Age: 62
End: 2022-03-23
Payer: COMMERCIAL

## 2022-03-23 DIAGNOSIS — E55.9 VITAMIN D DEFICIENCY: ICD-10-CM

## 2022-03-23 DIAGNOSIS — E78.5 DYSLIPIDEMIA: ICD-10-CM

## 2022-03-23 NOTE — TELEPHONE ENCOUNTER
From: Víctor Hollis  To: Physician Lawrence Merritt  Sent: 3/23/2022 12:17 PM PDT  Subject: Annual wellness exam     Hi Dr Merritt.     Should I go in for a lipid panel prior to my March 30 appointment?    Thank you.

## 2022-03-25 ENCOUNTER — HOSPITAL ENCOUNTER (OUTPATIENT)
Dept: LAB | Facility: MEDICAL CENTER | Age: 62
End: 2022-03-25
Attending: FAMILY MEDICINE
Payer: COMMERCIAL

## 2022-03-25 DIAGNOSIS — E78.5 DYSLIPIDEMIA: ICD-10-CM

## 2022-03-25 DIAGNOSIS — E55.9 VITAMIN D DEFICIENCY: ICD-10-CM

## 2022-03-25 LAB
25(OH)D3 SERPL-MCNC: 28 NG/ML (ref 30–100)
ALBUMIN SERPL BCP-MCNC: 4.7 G/DL (ref 3.2–4.9)
ALBUMIN/GLOB SERPL: 2.1 G/DL
ALP SERPL-CCNC: 91 U/L (ref 30–99)
ALT SERPL-CCNC: 31 U/L (ref 2–50)
ANION GAP SERPL CALC-SCNC: 12 MMOL/L (ref 7–16)
AST SERPL-CCNC: 29 U/L (ref 12–45)
BILIRUB SERPL-MCNC: 0.7 MG/DL (ref 0.1–1.5)
BUN SERPL-MCNC: 15 MG/DL (ref 8–22)
CALCIUM SERPL-MCNC: 9.4 MG/DL (ref 8.4–10.2)
CHLORIDE SERPL-SCNC: 105 MMOL/L (ref 96–112)
CHOLEST SERPL-MCNC: 97 MG/DL (ref 100–199)
CO2 SERPL-SCNC: 24 MMOL/L (ref 20–33)
CREAT SERPL-MCNC: 0.72 MG/DL (ref 0.5–1.4)
FASTING STATUS PATIENT QL REPORTED: NORMAL
GFR SERPLBLD CREATININE-BSD FMLA CKD-EPI: 103 ML/MIN/1.73 M 2
GLOBULIN SER CALC-MCNC: 2.2 G/DL (ref 1.9–3.5)
GLUCOSE SERPL-MCNC: 118 MG/DL (ref 65–99)
HDLC SERPL-MCNC: 37 MG/DL
LDLC SERPL CALC-MCNC: 27 MG/DL
POTASSIUM SERPL-SCNC: 4.1 MMOL/L (ref 3.6–5.5)
PROT SERPL-MCNC: 6.9 G/DL (ref 6–8.2)
SODIUM SERPL-SCNC: 141 MMOL/L (ref 135–145)
TRIGL SERPL-MCNC: 164 MG/DL (ref 0–149)

## 2022-03-25 PROCEDURE — 80061 LIPID PANEL: CPT

## 2022-03-25 PROCEDURE — 36415 COLL VENOUS BLD VENIPUNCTURE: CPT

## 2022-03-25 PROCEDURE — 80053 COMPREHEN METABOLIC PANEL: CPT

## 2022-03-25 PROCEDURE — 82306 VITAMIN D 25 HYDROXY: CPT

## 2022-03-28 SDOH — ECONOMIC STABILITY: FOOD INSECURITY: WITHIN THE PAST 12 MONTHS, THE FOOD YOU BOUGHT JUST DIDN'T LAST AND YOU DIDN'T HAVE MONEY TO GET MORE.: NEVER TRUE

## 2022-03-28 SDOH — ECONOMIC STABILITY: INCOME INSECURITY: IN THE LAST 12 MONTHS, WAS THERE A TIME WHEN YOU WERE NOT ABLE TO PAY THE MORTGAGE OR RENT ON TIME?: NO

## 2022-03-28 SDOH — ECONOMIC STABILITY: INCOME INSECURITY: HOW HARD IS IT FOR YOU TO PAY FOR THE VERY BASICS LIKE FOOD, HOUSING, MEDICAL CARE, AND HEATING?: NOT HARD AT ALL

## 2022-03-28 SDOH — ECONOMIC STABILITY: HOUSING INSECURITY: IN THE LAST 12 MONTHS, HOW MANY PLACES HAVE YOU LIVED?: 1

## 2022-03-28 SDOH — ECONOMIC STABILITY: TRANSPORTATION INSECURITY
IN THE PAST 12 MONTHS, HAS LACK OF TRANSPORTATION KEPT YOU FROM MEETINGS, WORK, OR FROM GETTING THINGS NEEDED FOR DAILY LIVING?: NO

## 2022-03-28 SDOH — ECONOMIC STABILITY: FOOD INSECURITY: WITHIN THE PAST 12 MONTHS, YOU WORRIED THAT YOUR FOOD WOULD RUN OUT BEFORE YOU GOT MONEY TO BUY MORE.: NEVER TRUE

## 2022-03-28 SDOH — HEALTH STABILITY: PHYSICAL HEALTH: ON AVERAGE, HOW MANY MINUTES DO YOU ENGAGE IN EXERCISE AT THIS LEVEL?: 150+ MIN

## 2022-03-28 SDOH — HEALTH STABILITY: PHYSICAL HEALTH: ON AVERAGE, HOW MANY DAYS PER WEEK DO YOU ENGAGE IN MODERATE TO STRENUOUS EXERCISE (LIKE A BRISK WALK)?: 5 DAYS

## 2022-03-28 ASSESSMENT — SOCIAL DETERMINANTS OF HEALTH (SDOH)
HOW OFTEN DO YOU GET TOGETHER WITH FRIENDS OR RELATIVES?: THREE TIMES A WEEK
IN A TYPICAL WEEK, HOW MANY TIMES DO YOU TALK ON THE PHONE WITH FAMILY, FRIENDS, OR NEIGHBORS?: MORE THAN THREE TIMES A WEEK
WITHIN THE PAST 12 MONTHS, YOU WORRIED THAT YOUR FOOD WOULD RUN OUT BEFORE YOU GOT THE MONEY TO BUY MORE: NEVER TRUE
DO YOU BELONG TO ANY CLUBS OR ORGANIZATIONS SUCH AS CHURCH GROUPS UNIONS, FRATERNAL OR ATHLETIC GROUPS, OR SCHOOL GROUPS?: YES
HOW OFTEN DO YOU ATTENT MEETINGS OF THE CLUB OR ORGANIZATION YOU BELONG TO?: 1 TO 4 TIMES PER YEAR
DO YOU BELONG TO ANY CLUBS OR ORGANIZATIONS SUCH AS CHURCH GROUPS UNIONS, FRATERNAL OR ATHLETIC GROUPS, OR SCHOOL GROUPS?: YES
HOW OFTEN DO YOU HAVE SIX OR MORE DRINKS ON ONE OCCASION: NEVER
HOW OFTEN DO YOU ATTEND CHURCH OR RELIGIOUS SERVICES?: PATIENT DECLINED
HOW HARD IS IT FOR YOU TO PAY FOR THE VERY BASICS LIKE FOOD, HOUSING, MEDICAL CARE, AND HEATING?: NOT HARD AT ALL
HOW OFTEN DO YOU HAVE A DRINK CONTAINING ALCOHOL: 4 OR MORE TIMES A WEEK
HOW OFTEN DO YOU GET TOGETHER WITH FRIENDS OR RELATIVES?: THREE TIMES A WEEK
HOW OFTEN DO YOU ATTENT MEETINGS OF THE CLUB OR ORGANIZATION YOU BELONG TO?: 1 TO 4 TIMES PER YEAR
HOW MANY DRINKS CONTAINING ALCOHOL DO YOU HAVE ON A TYPICAL DAY WHEN YOU ARE DRINKING: 1 OR 2
IN A TYPICAL WEEK, HOW MANY TIMES DO YOU TALK ON THE PHONE WITH FAMILY, FRIENDS, OR NEIGHBORS?: MORE THAN THREE TIMES A WEEK
HOW OFTEN DO YOU ATTEND CHURCH OR RELIGIOUS SERVICES?: PATIENT DECLINED

## 2022-03-28 ASSESSMENT — LIFESTYLE VARIABLES
HOW OFTEN DO YOU HAVE A DRINK CONTAINING ALCOHOL: 4 OR MORE TIMES A WEEK
HOW OFTEN DO YOU HAVE SIX OR MORE DRINKS ON ONE OCCASION: NEVER
HOW MANY STANDARD DRINKS CONTAINING ALCOHOL DO YOU HAVE ON A TYPICAL DAY: 1 OR 2

## 2022-03-30 ENCOUNTER — OFFICE VISIT (OUTPATIENT)
Dept: MEDICAL GROUP | Facility: MEDICAL CENTER | Age: 62
End: 2022-03-30
Payer: COMMERCIAL

## 2022-03-30 VITALS
TEMPERATURE: 98.4 F | SYSTOLIC BLOOD PRESSURE: 128 MMHG | WEIGHT: 189.6 LBS | BODY MASS INDEX: 28.73 KG/M2 | HEART RATE: 74 BPM | DIASTOLIC BLOOD PRESSURE: 58 MMHG | RESPIRATION RATE: 16 BRPM | OXYGEN SATURATION: 95 % | HEIGHT: 68 IN

## 2022-03-30 DIAGNOSIS — E78.5 DYSLIPIDEMIA: ICD-10-CM

## 2022-03-30 DIAGNOSIS — Z00.00 ANNUAL PHYSICAL EXAM: ICD-10-CM

## 2022-03-30 DIAGNOSIS — I10 ESSENTIAL HYPERTENSION: ICD-10-CM

## 2022-03-30 DIAGNOSIS — E55.9 VITAMIN D DEFICIENCY: ICD-10-CM

## 2022-03-30 DIAGNOSIS — Z12.5 SCREENING FOR PROSTATE CANCER: ICD-10-CM

## 2022-03-30 PROCEDURE — 99396 PREV VISIT EST AGE 40-64: CPT | Performed by: FAMILY MEDICINE

## 2022-03-30 ASSESSMENT — ENCOUNTER SYMPTOMS
VOMITING: 0
SINUS PAIN: 0
SENSORY CHANGE: 0
HEADACHES: 0
CHILLS: 0
EYE DISCHARGE: 0
FOCAL WEAKNESS: 0
NAUSEA: 0
HEMOPTYSIS: 0
DIZZINESS: 0
WHEEZING: 0
SPUTUM PRODUCTION: 0
WEIGHT LOSS: 0
MYALGIAS: 0
PALPITATIONS: 0
SHORTNESS OF BREATH: 0
CONSTIPATION: 0
ABDOMINAL PAIN: 0
DIARRHEA: 0
NERVOUS/ANXIOUS: 0
DEPRESSION: 0
COUGH: 0
FEVER: 0

## 2022-03-30 ASSESSMENT — PATIENT HEALTH QUESTIONNAIRE - PHQ9: CLINICAL INTERPRETATION OF PHQ2 SCORE: 0

## 2022-03-30 NOTE — PROGRESS NOTES
FAMILY MEDICINE VISIT                                                               Chief complaint::Diagnoses of Annual physical exam, Dyslipidemia, Essential hypertension, Vitamin D deficiency, and Screening for prostate cancer were pertinent to this visit.    History of present illness: Víctor Hollis is a 61 y.o. male who presented for annual physical exam.    Reports that he sometimes has stomach upset after eating cheese and some heavy cream food.  He is taking omeprazole 40 mg daily.  Last ultrasound on 3/16/2021 showed gallstones and fatty liver.  He has history of hypertension, blood pressure today came back at 128/58.  He has been having some upper respiratory symptoms including nasal congestion, sinus congestion.  He did Covid test today which came back negative.    Reports his last colonoscopy was 3 years ago and was normal.      Review of systems:     Review of Systems   Constitutional: Negative for chills, fever, malaise/fatigue and weight loss.   HENT: Positive for congestion. Negative for ear discharge, ear pain, hearing loss and sinus pain.    Eyes: Negative for discharge.   Respiratory: Negative for cough, hemoptysis, sputum production, shortness of breath and wheezing.    Cardiovascular: Negative for chest pain, palpitations and leg swelling.   Gastrointestinal: Negative for abdominal pain, constipation, diarrhea, nausea and vomiting.   Genitourinary: Negative for dysuria, frequency and urgency.   Musculoskeletal: Negative for joint pain and myalgias.   Skin: Negative for itching and rash.   Neurological: Negative for dizziness, sensory change, focal weakness and headaches.   Psychiatric/Behavioral: Negative for depression. The patient is not nervous/anxious.         Medications and Allergies:     Current Outpatient Medications   Medication Sig Dispense Refill   • ezetimibe (ZETIA) 10 MG Tab TAKE 1 TABLET BY MOUTH  DAILY 90 Tablet 0   • atorvastatin (LIPITOR) 80 MG tablet Take 1 Tablet by  "mouth every evening. 90 Tablet 3   • omeprazole (PRILOSEC) 40 MG delayed-release capsule Take 1 Capsule by mouth every day. 90 Capsule 3   • aspirin (ASA) 81 MG Chew Tab chewable tablet Take 1 Tab by mouth every day. 100 Tab 3   • amlodipine-benazepril (LOTREL) 10-20 MG per capsule Take 1 Cap by mouth every day.     • therapeutic multivitamin-minerals (THERAGRAN-M) Tab Take 1 Tab by mouth every day.     • Probiotic Product (PROBIOTIC FORMULA PO) Take  by mouth.       No current facility-administered medications for this visit.          Vitals:    /58 (BP Location: Left arm, Patient Position: Sitting, BP Cuff Size: Adult)   Pulse 74   Temp 36.9 °C (98.4 °F)   Resp 16   Ht 1.727 m (5' 8\")   Wt 86 kg (189 lb 9.5 oz)   SpO2 95%  Body mass index is 28.83 kg/m².    Physical Exam:     Physical Exam  Constitutional:       General: He is not in acute distress.     Appearance: He is not diaphoretic.   HENT:      Head: Normocephalic and atraumatic.      Right Ear: Tympanic membrane, ear canal and external ear normal.      Left Ear: Tympanic membrane, ear canal and external ear normal.      Nose: Congestion present.      Mouth/Throat:      Mouth: Mucous membranes are moist.      Pharynx: No oropharyngeal exudate or posterior oropharyngeal erythema.   Eyes:      General:         Right eye: No discharge.         Left eye: No discharge.      Conjunctiva/sclera: Conjunctivae normal.   Neck:      Thyroid: No thyromegaly.   Cardiovascular:      Rate and Rhythm: Normal rate and regular rhythm.      Heart sounds: Normal heart sounds. No murmur heard.  Pulmonary:      Effort: Pulmonary effort is normal. No respiratory distress.      Breath sounds: Normal breath sounds. No wheezing or rales.   Abdominal:      General: Bowel sounds are normal. There is no distension.      Palpations: Abdomen is soft.      Tenderness: There is no abdominal tenderness. There is no guarding.   Musculoskeletal:         General: No deformity. " Normal range of motion.      Cervical back: Neck supple.   Skin:     General: Skin is warm.      Findings: No rash.   Neurological:      General: No focal deficit present.      Mental Status: He is alert. Mental status is at baseline.      Gait: Gait is intact.   Psychiatric:         Mood and Affect: Mood and affect normal.         Judgment: Judgment normal.          Assessment/Plan:    1. Annual physical exam  Up-to-date for screenings and vaccines.  Continue to eat healthy diet and do exercise.    2. Dyslipidemia  Lipid panel showed elevated triglyceride level, LDL cholesterol level around lower end.  He is currently on Lipitor 80 mg daily due to history of coronary artery calcification.  He is following with cardiology.  Continue same medication regimen.  Repeat labs in 6-month.    Lab Results   Component Value Date/Time    CHOLSTRLTOT 97 (L) 03/25/2022 0744    TRIGLYCERIDE 164 (H) 03/25/2022 0744    HDL 37 (A) 03/25/2022 0744    LDL 27 03/25/2022 0744     - Comp Metabolic Panel; Future  - Lipid Profile; Future    3. Essential hypertension  Blood pressure stable today, continue Lotrel at same dose    4. Vitamin D deficiency  Vitamin D level came back at 28, recommended to take vitamin D 5000 international units daily.  - VITAMIN D,25 HYDROXY; Future    5. Screening for prostate cancer  - PROSTATE SPECIFIC AG SCREENING; Future    Please note that this dictation was created using voice recognition software. I have made every reasonable attempt to correct obvious errors, but I expect that there are errors of grammar and possibly content that I did not discover before finalizing the note.    Follow up in 6 months for lab follow-up.

## 2022-03-31 ENCOUNTER — PATIENT MESSAGE (OUTPATIENT)
Dept: MEDICAL GROUP | Facility: MEDICAL CENTER | Age: 62
End: 2022-03-31
Payer: COMMERCIAL

## 2022-04-01 ENCOUNTER — PATIENT MESSAGE (OUTPATIENT)
Dept: MEDICAL GROUP | Facility: MEDICAL CENTER | Age: 62
End: 2022-04-01
Payer: COMMERCIAL

## 2022-04-01 RX ORDER — AMLODIPINE BESYLATE AND BENAZEPRIL HYDROCHLORIDE 10; 20 MG/1; MG/1
1 CAPSULE ORAL DAILY
Qty: 90 CAPSULE | Refills: 3 | Status: SHIPPED | OUTPATIENT
Start: 2022-04-01 | End: 2022-04-01 | Stop reason: SDUPTHER

## 2022-04-01 RX ORDER — AMLODIPINE BESYLATE AND BENAZEPRIL HYDROCHLORIDE 10; 20 MG/1; MG/1
1 CAPSULE ORAL DAILY
Qty: 30 CAPSULE | Refills: 0 | Status: SHIPPED | OUTPATIENT
Start: 2022-04-01 | End: 2022-06-18 | Stop reason: SDUPTHER

## 2022-04-01 NOTE — TELEPHONE ENCOUNTER
From: Víctor Hollis  To: Physician Lawrence Merritt  Sent: 3/31/2022 6:22 PM PDT  Subject: Blood pressure medicine     Hi Dr Merritt. I have a about one weeks worth of blood pressure medicine. I do t know why I didn’t catch this but can we get [facility] to deliver by end of next week?     Thank you and nice seeing you this week.

## 2022-04-05 NOTE — TELEPHONE ENCOUNTER
Pt due for appt. Message sent to scheduling.   Complex Repair And Graft Additional Text (Will Appearing After The Standard Complex Repair Text): The complex repair was not sufficient to completely close the primary defect. The remaining additional defect was repaired with the graft mentioned below.

## 2022-04-21 ENCOUNTER — PATIENT MESSAGE (OUTPATIENT)
Dept: MEDICAL GROUP | Facility: MEDICAL CENTER | Age: 62
End: 2022-04-21
Payer: COMMERCIAL

## 2022-04-21 NOTE — TELEPHONE ENCOUNTER
From: Víctor Hollis  To: Physician Lawrence Merritt  Sent: 4/21/2022 10:03 AM PDT  Subject: colonoscopy document attached    Colonoscopy results attached . Test taken Nov 7, 2019

## 2022-04-25 DIAGNOSIS — K80.20 GALLSTONES: ICD-10-CM

## 2022-04-25 DIAGNOSIS — K21.00 GASTROESOPHAGEAL REFLUX DISEASE WITH ESOPHAGITIS WITHOUT HEMORRHAGE: ICD-10-CM

## 2022-04-25 DIAGNOSIS — K44.9 HIATAL HERNIA: ICD-10-CM

## 2022-05-03 DIAGNOSIS — E78.5 DYSLIPIDEMIA: ICD-10-CM

## 2022-05-03 RX ORDER — EZETIMIBE 10 MG/1
TABLET ORAL
Qty: 90 TABLET | Refills: 3 | Status: SHIPPED | OUTPATIENT
Start: 2022-05-03 | End: 2023-04-06

## 2022-06-02 RX ORDER — OMEPRAZOLE 40 MG/1
CAPSULE, DELAYED RELEASE ORAL
Qty: 90 CAPSULE | Refills: 3 | Status: SHIPPED | OUTPATIENT
Start: 2022-06-02 | End: 2022-10-31

## 2022-06-20 RX ORDER — AMLODIPINE BESYLATE AND BENAZEPRIL HYDROCHLORIDE 10; 20 MG/1; MG/1
1 CAPSULE ORAL DAILY
Qty: 30 CAPSULE | Refills: 0 | Status: SHIPPED | OUTPATIENT
Start: 2022-06-20 | End: 2022-07-05

## 2022-07-05 RX ORDER — AMLODIPINE BESYLATE AND BENAZEPRIL HYDROCHLORIDE 10; 20 MG/1; MG/1
CAPSULE ORAL
Qty: 90 CAPSULE | Refills: 3 | Status: SHIPPED | OUTPATIENT
Start: 2022-07-05 | End: 2023-04-05

## 2022-07-08 ENCOUNTER — OFFICE VISIT (OUTPATIENT)
Dept: MEDICAL GROUP | Facility: MEDICAL CENTER | Age: 62
End: 2022-07-08
Payer: COMMERCIAL

## 2022-07-08 VITALS
BODY MASS INDEX: 29.07 KG/M2 | WEIGHT: 191.8 LBS | OXYGEN SATURATION: 96 % | HEIGHT: 68 IN | DIASTOLIC BLOOD PRESSURE: 62 MMHG | SYSTOLIC BLOOD PRESSURE: 130 MMHG | RESPIRATION RATE: 16 BRPM | TEMPERATURE: 97.9 F | HEART RATE: 72 BPM

## 2022-07-08 DIAGNOSIS — F40.240 CLAUSTROPHOBIA: ICD-10-CM

## 2022-07-08 PROCEDURE — 99214 OFFICE O/P EST MOD 30 MIN: CPT | Performed by: FAMILY MEDICINE

## 2022-07-08 RX ORDER — HYDROXYZINE HYDROCHLORIDE 25 MG/1
25 TABLET, FILM COATED ORAL EVERY 8 HOURS PRN
Qty: 30 TABLET | Refills: 1 | Status: SHIPPED
Start: 2022-07-08 | End: 2022-10-31

## 2022-07-08 ASSESSMENT — ENCOUNTER SYMPTOMS
PALPITATIONS: 0
FEVER: 0
CHILLS: 0

## 2022-07-08 NOTE — PROGRESS NOTES
FAMILY MEDICINE VISIT                                                               Chief complaint::The encounter diagnosis was Claustrophobia.    History of present illness: Víctor Hollis is a 62 y.o. male who presented for discussion for medication for claustrophobia.    Problem   Claustrophobia    Reports that whenever he is in the close space, he gets feeling of being doomed in or if he is coffin.  This happens usually when he is in the window seat or middle seat in plane.  If he is sitting in aisle seat, then he does not experience the symptoms.  He has not tried any medications before.  Reports that the symptoms are getting more worse frequently.  He has to travel frequently for his work.  He would like to try medication for the symptoms.  The symptoms also happen if he is getting imaging like MRI or CAT scan.         Review of systems:     Review of Systems   Constitutional: Negative for chills and fever.   Cardiovascular: Negative for chest pain, palpitations and leg swelling.   Psychiatric/Behavioral:        Claustrophobia        Medications and Allergies:     Current Outpatient Medications   Medication Sig Dispense Refill   • hydrOXYzine HCl (ATARAX) 25 MG Tab Take 1 Tablet by mouth every 8 hours as needed for Anxiety. 30 Tablet 1   • amlodipine-benazepril (LOTREL) 10-20 MG per capsule TAKE 1 CAPSULE BY MOUTH  DAILY 90 Capsule 3   • omeprazole (PRILOSEC) 40 MG delayed-release capsule TAKE 1 CAPSULE BY MOUTH  DAILY 90 Capsule 3   • ezetimibe (ZETIA) 10 MG Tab TAKE 1 TABLET BY MOUTH  DAILY 90 Tablet 3   • atorvastatin (LIPITOR) 80 MG tablet Take 1 Tablet by mouth every evening. 90 Tablet 3   • aspirin (ASA) 81 MG Chew Tab chewable tablet Take 1 Tab by mouth every day. 100 Tab 3   • therapeutic multivitamin-minerals (THERAGRAN-M) Tab Take 1 Tab by mouth every day.     • Probiotic Product (PROBIOTIC FORMULA PO) Take  by mouth.       No current facility-administered medications for this visit.       "    Vitals:    /62 (BP Location: Left arm, Patient Position: Sitting, BP Cuff Size: Adult)   Pulse 72   Temp 36.6 °C (97.9 °F)   Resp 16   Ht 1.727 m (5' 8\")   Wt 87 kg (191 lb 12.8 oz)   SpO2 96%  Body mass index is 29.16 kg/m².    Physical Exam:     Physical Exam  Constitutional:       General: He is not in acute distress.  HENT:      Head: Normocephalic and atraumatic.   Eyes:      Conjunctiva/sclera: Conjunctivae normal.   Cardiovascular:      Rate and Rhythm: Normal rate.   Pulmonary:      Effort: Pulmonary effort is normal. No respiratory distress.   Musculoskeletal:         General: No deformity.      Cervical back: Neck supple.   Skin:     Findings: No rash.   Neurological:      Mental Status: He is alert.      Gait: Gait is intact.   Psychiatric:         Mood and Affect: Mood and affect normal.         Judgment: Judgment normal.        Assessment/Plan:    Problem List Items Addressed This Visit     Claustrophobia     Chronic health problem, uncontrolled, getting more frequently.  Prescribed Atarax to be used on as-needed basis.  Discussed this can cause drowsiness.  Referral to behavioral health for therapy for claustrophobia.  We discussed about other options like Ativan or Xanax, would like to avoid these medications if Atarax is working well.  Discussed chronic long-term side effects of these medications.  Recommended to do some breathing exercises and relaxation exercises when he is experiencing the symptoms.           Relevant Medications    hydrOXYzine HCl (ATARAX) 25 MG Tab    Other Relevant Orders    Referral to Behavioral Health           Please note that this dictation was created using voice recognition software. I have made every reasonable attempt to correct obvious errors, but I expect that there are errors of grammar and possibly content that I did not discover before finalizing the note.    Follow up as needed if symptoms are not getting better  "

## 2022-07-08 NOTE — ASSESSMENT & PLAN NOTE
Chronic health problem, uncontrolled, getting more frequently.  Prescribed Atarax to be used on as-needed basis.  Discussed this can cause drowsiness.  Referral to behavioral health for therapy for claustrophobia.  We discussed about other options like Ativan or Xanax, would like to avoid these medications if Atarax is working well.  Discussed chronic long-term side effects of these medications.  Recommended to do some breathing exercises and relaxation exercises when he is experiencing the symptoms.

## 2022-07-17 DIAGNOSIS — E78.5 DYSLIPIDEMIA: ICD-10-CM

## 2022-07-18 RX ORDER — ATORVASTATIN CALCIUM 80 MG/1
TABLET, FILM COATED ORAL
Qty: 90 TABLET | Refills: 0 | Status: SHIPPED | OUTPATIENT
Start: 2022-07-18 | End: 2022-10-31 | Stop reason: SDUPTHER

## 2022-07-18 NOTE — TELEPHONE ENCOUNTER
Is the patient due for a refill? No    Was the patient seen the past year? Yes    Date of last office visit: 9/27/21    Does the patient have an upcoming appointment?  Yes   If yes, When? 9/6/22    Provider to refill: AB    Does the patients insurance require a 100 day supply? No

## 2022-07-21 ENCOUNTER — OFFICE VISIT (OUTPATIENT)
Dept: MEDICAL GROUP | Facility: MEDICAL CENTER | Age: 62
End: 2022-07-21
Payer: COMMERCIAL

## 2022-07-21 VITALS
RESPIRATION RATE: 16 BRPM | WEIGHT: 194 LBS | HEART RATE: 64 BPM | SYSTOLIC BLOOD PRESSURE: 132 MMHG | DIASTOLIC BLOOD PRESSURE: 62 MMHG | TEMPERATURE: 98 F | HEIGHT: 68 IN | OXYGEN SATURATION: 96 % | BODY MASS INDEX: 29.4 KG/M2

## 2022-07-21 DIAGNOSIS — K44.9 HIATAL HERNIA: ICD-10-CM

## 2022-07-21 DIAGNOSIS — M25.422 ELBOW SWELLING, LEFT: ICD-10-CM

## 2022-07-21 DIAGNOSIS — K20.90 ESOPHAGITIS ON BIOPSY: ICD-10-CM

## 2022-07-21 PROCEDURE — 99214 OFFICE O/P EST MOD 30 MIN: CPT | Performed by: FAMILY MEDICINE

## 2022-07-21 RX ORDER — METHYLPREDNISOLONE 4 MG/1
TABLET ORAL
Qty: 21 TABLET | Refills: 0 | Status: SHIPPED
Start: 2022-07-21 | End: 2022-10-31

## 2022-07-21 ASSESSMENT — ENCOUNTER SYMPTOMS
CHILLS: 0
HEARTBURN: 1
FEVER: 0

## 2022-07-21 NOTE — ASSESSMENT & PLAN NOTE
New health problem, recommended to follow-up with gastroenterology, discussed may need to go on pantoprazole 40 mg daily as he still sometimes gets symptoms during daytime with omeprazole 40 mg.  He will discuss about this with gastroenterology
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
## 2022-07-21 NOTE — ASSESSMENT & PLAN NOTE
New health problem, recommended to follow-up with gastroenterology.  Has appointment with gastroenterology today.  Recommended to discuss about pantoprazole, may need to go on pantoprazole as he still getting symptoms on omeprazole.

## 2022-07-21 NOTE — ASSESSMENT & PLAN NOTE
New health problem, swelling is over olecranon process.  Appears small fluid collection also there.  Start Medrol Dosepak.  Discussed if not getting better, referral to sports medicine.

## 2022-10-05 ENCOUNTER — HOSPITAL ENCOUNTER (OUTPATIENT)
Dept: LAB | Facility: MEDICAL CENTER | Age: 62
End: 2022-10-05
Attending: FAMILY MEDICINE
Payer: COMMERCIAL

## 2022-10-05 DIAGNOSIS — Z12.5 SCREENING FOR PROSTATE CANCER: ICD-10-CM

## 2022-10-05 DIAGNOSIS — E55.9 VITAMIN D DEFICIENCY: ICD-10-CM

## 2022-10-05 DIAGNOSIS — E78.5 DYSLIPIDEMIA: ICD-10-CM

## 2022-10-05 LAB
ALBUMIN SERPL BCP-MCNC: 4.8 G/DL (ref 3.2–4.9)
ALBUMIN/GLOB SERPL: 2.2 G/DL
ALP SERPL-CCNC: 77 U/L (ref 30–99)
ALT SERPL-CCNC: 23 U/L (ref 2–50)
ANION GAP SERPL CALC-SCNC: 12 MMOL/L (ref 7–16)
AST SERPL-CCNC: 21 U/L (ref 12–45)
BILIRUB SERPL-MCNC: 0.8 MG/DL (ref 0.1–1.5)
BUN SERPL-MCNC: 12 MG/DL (ref 8–22)
CALCIUM SERPL-MCNC: 9.5 MG/DL (ref 8.4–10.2)
CHLORIDE SERPL-SCNC: 104 MMOL/L (ref 96–112)
CHOLEST SERPL-MCNC: 124 MG/DL (ref 100–199)
CO2 SERPL-SCNC: 24 MMOL/L (ref 20–33)
CREAT SERPL-MCNC: 0.73 MG/DL (ref 0.5–1.4)
FASTING STATUS PATIENT QL REPORTED: NORMAL
GFR SERPLBLD CREATININE-BSD FMLA CKD-EPI: 102 ML/MIN/1.73 M 2
GLOBULIN SER CALC-MCNC: 2.2 G/DL (ref 1.9–3.5)
GLUCOSE SERPL-MCNC: 120 MG/DL (ref 65–99)
HDLC SERPL-MCNC: 47 MG/DL
LDLC SERPL CALC-MCNC: 48 MG/DL
POTASSIUM SERPL-SCNC: 3.8 MMOL/L (ref 3.6–5.5)
PROT SERPL-MCNC: 7 G/DL (ref 6–8.2)
SODIUM SERPL-SCNC: 140 MMOL/L (ref 135–145)
TRIGL SERPL-MCNC: 144 MG/DL (ref 0–149)

## 2022-10-05 PROCEDURE — 84153 ASSAY OF PSA TOTAL: CPT

## 2022-10-05 PROCEDURE — 80061 LIPID PANEL: CPT

## 2022-10-05 PROCEDURE — 36415 COLL VENOUS BLD VENIPUNCTURE: CPT

## 2022-10-05 PROCEDURE — 80053 COMPREHEN METABOLIC PANEL: CPT

## 2022-10-05 PROCEDURE — 82306 VITAMIN D 25 HYDROXY: CPT

## 2022-10-09 LAB
25(OH)D3 SERPL-MCNC: 27 NG/ML (ref 30–100)
PSA SERPL-MCNC: 1.53 NG/ML (ref 0–4)

## 2022-10-31 ENCOUNTER — OFFICE VISIT (OUTPATIENT)
Dept: CARDIOLOGY | Facility: MEDICAL CENTER | Age: 62
End: 2022-10-31
Payer: COMMERCIAL

## 2022-10-31 VITALS
OXYGEN SATURATION: 96 % | BODY MASS INDEX: 29.7 KG/M2 | DIASTOLIC BLOOD PRESSURE: 62 MMHG | RESPIRATION RATE: 16 BRPM | HEIGHT: 68 IN | WEIGHT: 196 LBS | SYSTOLIC BLOOD PRESSURE: 128 MMHG | HEART RATE: 68 BPM

## 2022-10-31 DIAGNOSIS — E88.810 METABOLIC SYNDROME: ICD-10-CM

## 2022-10-31 DIAGNOSIS — I10 ESSENTIAL HYPERTENSION: ICD-10-CM

## 2022-10-31 DIAGNOSIS — I25.84 CORONARY ARTERY CALCIFICATION: ICD-10-CM

## 2022-10-31 DIAGNOSIS — I25.10 CORONARY ARTERY CALCIFICATION: ICD-10-CM

## 2022-10-31 DIAGNOSIS — E78.5 DYSLIPIDEMIA: ICD-10-CM

## 2022-10-31 PROCEDURE — 99214 OFFICE O/P EST MOD 30 MIN: CPT | Performed by: NURSE PRACTITIONER

## 2022-10-31 RX ORDER — CHOLECALCIFEROL (VITAMIN D3) 125 MCG
CAPSULE ORAL
COMMUNITY

## 2022-10-31 RX ORDER — ATORVASTATIN CALCIUM 80 MG/1
80 TABLET, FILM COATED ORAL EVERY EVENING
Qty: 90 TABLET | Refills: 3 | Status: SHIPPED | OUTPATIENT
Start: 2022-10-31 | End: 2023-10-12

## 2022-10-31 RX ORDER — PANTOPRAZOLE SODIUM 40 MG/1
TABLET, DELAYED RELEASE ORAL
COMMUNITY
Start: 2022-08-24 | End: 2023-11-06 | Stop reason: SDUPTHER

## 2022-10-31 ASSESSMENT — ENCOUNTER SYMPTOMS
BRUISES/BLEEDS EASILY: 0
PND: 0
SHORTNESS OF BREATH: 0
DIZZINESS: 0
HEADACHES: 0
MYALGIAS: 0
ABDOMINAL PAIN: 0
NAUSEA: 0
PALPITATIONS: 0
LOSS OF CONSCIOUSNESS: 0
ORTHOPNEA: 0
COUGH: 0
FEVER: 0
CHILLS: 0
INSOMNIA: 0

## 2022-10-31 NOTE — PROGRESS NOTES
Chief Complaint   Patient presents with    Follow-Up    HTN (Controlled)    Hyperlipidemia       Subjective     Art Jameel Hollis is a 62 y.o. male who presents today for annual follow-up of elevated CTCS, hyperlipidemia, and hypertension.    Salo is a 62 year old male with history of elevated CTCS (1300+ in May 2019), which led to coronary angiogram in June 2019, which showed nonobstructive CAD, LVEF 65%. He is treated with statin and Zetia. He also has hypertension, treated, and was last seen by me in September 2021.     He is here today for annual follow-up. Generally, he is doing very well and continues to exercise 5-7 days per week; he is also very active. He denies any chest pain, pressure or discomfort; no symptomatic palpitations; no shortness of breath, orthopnea or PND; no dizziness, lightheadedness or syncope; no LE edema. BP is stable at home. He feels good today.    Past Medical History:   Diagnosis Date    Allergy     Elevated coronary artery calcium score     Treated with statin and Zetia. June 2019: Coronary angiogram with nonobstructive CAD, LVEF 65%    GERD (gastroesophageal reflux disease)     Hiatal hernia     Hyperlipidemia     Hypertension      Past Surgical History:   Procedure Laterality Date    ZZZ CARDIAC CATH  06/05/2019    non obstructive disease.    TENDON REPAIR Right 2008     Family History   Problem Relation Age of Onset    Hypertension Mother     Diabetes Father     Hypertension Sister     Hypertension Brother     Diabetes Brother     Hypertension Son      Social History     Socioeconomic History    Marital status:      Spouse name: Not on file    Number of children: Not on file    Years of education: Not on file    Highest education level: Bachelor's degree (e.g., BA, AB, BS)   Occupational History    Not on file   Tobacco Use    Smoking status: Former     Packs/day: 0.25     Years: 3.00     Pack years: 0.75     Types: Cigarettes     Start date: 1980     Quit date: 1983      "Years since quittin.8    Smokeless tobacco: Never   Vaping Use    Vaping Use: Never used   Substance and Sexual Activity    Alcohol use: Yes     Alcohol/week: 12.6 oz     Types: 21 Standard drinks or equivalent per week     Comment: 2-3 drinks/day     Drug use: No    Sexual activity: Yes     Partners: Female   Other Topics Concern    Not on file   Social History Narrative    Not on file     Social Determinants of Health     Financial Resource Strain: Low Risk     Difficulty of Paying Living Expenses: Not hard at all   Food Insecurity: No Food Insecurity    Worried About Running Out of Food in the Last Year: Never true    Ran Out of Food in the Last Year: Never true   Transportation Needs: No Transportation Needs    Lack of Transportation (Medical): No    Lack of Transportation (Non-Medical): No   Physical Activity: Sufficiently Active    Days of Exercise per Week: 5 days    Minutes of Exercise per Session: 150+ min   Stress: No Stress Concern Present    Feeling of Stress : Not at all   Social Connections: Unknown    Frequency of Communication with Friends and Family: More than three times a week    Frequency of Social Gatherings with Friends and Family: Three times a week    Attends Nondenominational Services: Patient refused    Active Member of Clubs or Organizations: Yes    Attends Club or Organization Meetings: 1 to 4 times per year    Marital Status:    Intimate Partner Violence: Not on file   Housing Stability: Low Risk     Unable to Pay for Housing in the Last Year: No    Number of Places Lived in the Last Year: 1    Unstable Housing in the Last Year: No     Allergies   Allergen Reactions    Other Food Itching     Shell fish; Pt states \"at times skin rash and/or puffy/itchy eyes\"     Outpatient Encounter Medications as of 10/31/2022   Medication Sig Dispense Refill    pantoprazole (PROTONIX) 40 MG Tablet Delayed Response       Cholecalciferol (VITAMIN D) 125 MCG (5000 UT) Cap Take  by mouth.      " "atorvastatin (LIPITOR) 80 MG tablet Take 1 Tablet by mouth every evening. 90 Tablet 3    amlodipine-benazepril (LOTREL) 10-20 MG per capsule TAKE 1 CAPSULE BY MOUTH  DAILY 90 Capsule 3    ezetimibe (ZETIA) 10 MG Tab TAKE 1 TABLET BY MOUTH  DAILY 90 Tablet 3    aspirin (ASA) 81 MG Chew Tab chewable tablet Take 1 Tab by mouth every day. 100 Tab 3    therapeutic multivitamin-minerals (THERAGRAN-M) Tab Take 1 Tab by mouth every day.      Probiotic Product (PROBIOTIC FORMULA PO) Take  by mouth.      [DISCONTINUED] PANTOPRAZOLE SODIUM PO  (Patient not taking: Reported on 10/31/2022)      [DISCONTINUED] methylPREDNISolone (MEDROL DOSEPAK) 4 MG Tablet Therapy Pack As directed on the packaging label. 21 Tablet 0    [DISCONTINUED] atorvastatin (LIPITOR) 80 MG tablet TAKE 1 TABLET BY MOUTH IN  THE EVENING 90 Tablet 0    [DISCONTINUED] hydrOXYzine HCl (ATARAX) 25 MG Tab Take 1 Tablet by mouth every 8 hours as needed for Anxiety. (Patient not taking: Reported on 10/31/2022) 30 Tablet 1    [DISCONTINUED] omeprazole (PRILOSEC) 40 MG delayed-release capsule TAKE 1 CAPSULE BY MOUTH  DAILY 90 Capsule 3     No facility-administered encounter medications on file as of 10/31/2022.     Review of Systems   Constitutional:  Negative for chills and fever.   HENT:  Negative for congestion.    Respiratory:  Negative for cough and shortness of breath.    Cardiovascular:  Negative for chest pain, palpitations, orthopnea, leg swelling and PND.   Gastrointestinal:  Negative for abdominal pain and nausea.   Musculoskeletal:  Negative for myalgias.   Skin:  Negative for rash.   Neurological:  Negative for dizziness, loss of consciousness and headaches.   Endo/Heme/Allergies:  Does not bruise/bleed easily.   Psychiatric/Behavioral:  The patient does not have insomnia.             Objective     /62 (BP Location: Left arm, Patient Position: Sitting, BP Cuff Size: Adult)   Pulse 68   Resp 16   Ht 1.727 m (5' 8\")   Wt 88.9 kg (196 lb)   SpO2 " 96%   BMI 29.80 kg/m²     Physical Exam  Constitutional:       Appearance: He is well-developed.   HENT:      Head: Normocephalic.   Neck:      Vascular: No JVD.   Cardiovascular:      Rate and Rhythm: Normal rate and regular rhythm.      Heart sounds: Normal heart sounds.   Pulmonary:      Effort: Pulmonary effort is normal. No respiratory distress.      Breath sounds: Normal breath sounds. No wheezing or rales.   Abdominal:      General: Bowel sounds are normal. There is no distension.      Palpations: Abdomen is soft.      Tenderness: There is no abdominal tenderness.   Musculoskeletal:         General: Normal range of motion.      Cervical back: Normal range of motion and neck supple.   Skin:     General: Skin is warm and dry.      Findings: No rash.   Neurological:      Mental Status: He is alert and oriented to person, place, and time.     POSTPROCEDURE DIAGNOSES OF CORONARY ANGIOGRAM OF 6/5/2019:  1.  Nonobstructive coronary artery disease.    2.  Normal left ventricular systolic function with ejection fraction of 65%.    3.  Elevated left ventricular end diastolic pressure.      FINDINGS OF CTCS OF 5/2/2019:  Coronary calcification:  LMA - 0.0  LCX - 41.5  LAD - 395.0  RCA - 928.5  PDA - 0.0  Total Calcium Score: 1365     Lab Results   Component Value Date/Time    CHOLSTRLTOT 124 10/05/2022 09:24 AM    LDL 48 10/05/2022 09:24 AM    HDL 47 10/05/2022 09:24 AM    TRIGLYCERIDE 144 10/05/2022 09:24 AM       Lab Results   Component Value Date/Time    SODIUM 140 10/05/2022 09:24 AM    POTASSIUM 3.8 10/05/2022 09:24 AM    CHLORIDE 104 10/05/2022 09:24 AM    CO2 24 10/05/2022 09:24 AM    GLUCOSE 120 (H) 10/05/2022 09:24 AM    BUN 12 10/05/2022 09:24 AM    CREATININE 0.73 10/05/2022 09:24 AM    BUNCREATRAT 19 07/15/2020 05:07 AM     Lab Results   Component Value Date/Time    ALKPHOSPHAT 77 10/05/2022 09:24 AM    ASTSGOT 21 10/05/2022 09:24 AM    ALTSGPT 23 10/05/2022 09:24 AM    TBILIRUBIN 0.8 10/05/2022 09:24 AM          Assessment & Plan     1. Coronary artery calcification        2. Dyslipidemia  atorvastatin (LIPITOR) 80 MG tablet      3. Essential hypertension        4. Metabolic syndrome            Medical Decision Making: Today's Assessment/Status/Plan:      1. Elevated CTCS at 1300, on Lipitor and Zetia. Recent LDL was 48 (at goal). No angina or shortness of breath.    2. Hypertension, treated with Lotrel 10/20mg once daily. BP is well controlled.    3. Possible metabolic syndrome with hyperglycemia and history of elevated triglycerides; consider HgbA1c testing. Will defer to PCP.    He is doing well, and feels good. Same medications, which are renewed. Follow-up annually, sooner if clinical condition changes.                   no

## 2023-03-27 ENCOUNTER — PATIENT MESSAGE (OUTPATIENT)
Dept: MEDICAL GROUP | Facility: MEDICAL CENTER | Age: 63
End: 2023-03-27
Payer: COMMERCIAL

## 2023-03-27 DIAGNOSIS — Z12.5 SCREENING FOR PROSTATE CANCER: ICD-10-CM

## 2023-03-27 DIAGNOSIS — R73.01 IMPAIRED FASTING GLUCOSE: ICD-10-CM

## 2023-03-27 DIAGNOSIS — I10 ESSENTIAL HYPERTENSION: ICD-10-CM

## 2023-03-27 DIAGNOSIS — E55.9 VITAMIN D DEFICIENCY: ICD-10-CM

## 2023-03-27 DIAGNOSIS — E78.5 DYSLIPIDEMIA: ICD-10-CM

## 2023-03-30 ENCOUNTER — HOSPITAL ENCOUNTER (OUTPATIENT)
Dept: LAB | Facility: MEDICAL CENTER | Age: 63
End: 2023-03-30
Attending: FAMILY MEDICINE
Payer: COMMERCIAL

## 2023-03-30 DIAGNOSIS — I10 ESSENTIAL HYPERTENSION: ICD-10-CM

## 2023-03-30 DIAGNOSIS — R73.01 IMPAIRED FASTING GLUCOSE: ICD-10-CM

## 2023-03-30 DIAGNOSIS — E55.9 VITAMIN D DEFICIENCY: ICD-10-CM

## 2023-03-30 DIAGNOSIS — E78.5 DYSLIPIDEMIA: ICD-10-CM

## 2023-03-30 DIAGNOSIS — Z12.5 SCREENING FOR PROSTATE CANCER: ICD-10-CM

## 2023-03-30 LAB
25(OH)D3 SERPL-MCNC: 44 NG/ML (ref 30–100)
ALBUMIN SERPL BCP-MCNC: 4.5 G/DL (ref 3.2–4.9)
ALBUMIN/GLOB SERPL: 1.7 G/DL
ALP SERPL-CCNC: 75 U/L (ref 30–99)
ALT SERPL-CCNC: 26 U/L (ref 2–50)
ANION GAP SERPL CALC-SCNC: 11 MMOL/L (ref 7–16)
AST SERPL-CCNC: 23 U/L (ref 12–45)
BILIRUB SERPL-MCNC: 0.9 MG/DL (ref 0.1–1.5)
BUN SERPL-MCNC: 14 MG/DL (ref 8–22)
CALCIUM ALBUM COR SERPL-MCNC: 9 MG/DL (ref 8.5–10.5)
CALCIUM SERPL-MCNC: 9.4 MG/DL (ref 8.4–10.2)
CHLORIDE SERPL-SCNC: 104 MMOL/L (ref 96–112)
CHOLEST SERPL-MCNC: 131 MG/DL (ref 100–199)
CO2 SERPL-SCNC: 26 MMOL/L (ref 20–33)
CREAT SERPL-MCNC: 0.74 MG/DL (ref 0.5–1.4)
EST. AVERAGE GLUCOSE BLD GHB EST-MCNC: 120 MG/DL
FASTING STATUS PATIENT QL REPORTED: NORMAL
GFR SERPLBLD CREATININE-BSD FMLA CKD-EPI: 101 ML/MIN/1.73 M 2
GLOBULIN SER CALC-MCNC: 2.6 G/DL (ref 1.9–3.5)
GLUCOSE SERPL-MCNC: 114 MG/DL (ref 65–99)
HBA1C MFR BLD: 5.8 % (ref 4–5.6)
HDLC SERPL-MCNC: 46 MG/DL
LDLC SERPL CALC-MCNC: 43 MG/DL
POTASSIUM SERPL-SCNC: 4 MMOL/L (ref 3.6–5.5)
PROT SERPL-MCNC: 7.1 G/DL (ref 6–8.2)
PSA SERPL-MCNC: 1.72 NG/ML (ref 0–4)
SODIUM SERPL-SCNC: 141 MMOL/L (ref 135–145)
TRIGL SERPL-MCNC: 212 MG/DL (ref 0–149)

## 2023-03-30 PROCEDURE — 36415 COLL VENOUS BLD VENIPUNCTURE: CPT

## 2023-03-30 PROCEDURE — 82306 VITAMIN D 25 HYDROXY: CPT

## 2023-03-30 PROCEDURE — 80061 LIPID PANEL: CPT

## 2023-03-30 PROCEDURE — 83036 HEMOGLOBIN GLYCOSYLATED A1C: CPT

## 2023-03-30 PROCEDURE — 84153 ASSAY OF PSA TOTAL: CPT

## 2023-03-30 PROCEDURE — 80053 COMPREHEN METABOLIC PANEL: CPT

## 2023-04-05 DIAGNOSIS — E78.5 DYSLIPIDEMIA: ICD-10-CM

## 2023-04-05 RX ORDER — AMLODIPINE BESYLATE AND BENAZEPRIL HYDROCHLORIDE 10; 20 MG/1; MG/1
CAPSULE ORAL
Qty: 90 CAPSULE | Refills: 3 | Status: SHIPPED | OUTPATIENT
Start: 2023-04-05 | End: 2023-10-29 | Stop reason: SDUPTHER

## 2023-04-06 RX ORDER — EZETIMIBE 10 MG/1
TABLET ORAL
Qty: 90 TABLET | Refills: 3 | Status: SHIPPED | OUTPATIENT
Start: 2023-04-06 | End: 2024-02-23 | Stop reason: SDUPTHER

## 2023-04-10 SDOH — ECONOMIC STABILITY: HOUSING INSECURITY: IN THE LAST 12 MONTHS, HOW MANY PLACES HAVE YOU LIVED?: 1

## 2023-04-10 SDOH — HEALTH STABILITY: PHYSICAL HEALTH: ON AVERAGE, HOW MANY MINUTES DO YOU ENGAGE IN EXERCISE AT THIS LEVEL?: 30 MIN

## 2023-04-10 SDOH — ECONOMIC STABILITY: INCOME INSECURITY: IN THE LAST 12 MONTHS, WAS THERE A TIME WHEN YOU WERE NOT ABLE TO PAY THE MORTGAGE OR RENT ON TIME?: NO

## 2023-04-10 SDOH — ECONOMIC STABILITY: FOOD INSECURITY: WITHIN THE PAST 12 MONTHS, THE FOOD YOU BOUGHT JUST DIDN'T LAST AND YOU DIDN'T HAVE MONEY TO GET MORE.: NEVER TRUE

## 2023-04-10 SDOH — HEALTH STABILITY: PHYSICAL HEALTH: ON AVERAGE, HOW MANY DAYS PER WEEK DO YOU ENGAGE IN MODERATE TO STRENUOUS EXERCISE (LIKE A BRISK WALK)?: 5 DAYS

## 2023-04-10 SDOH — ECONOMIC STABILITY: FOOD INSECURITY: WITHIN THE PAST 12 MONTHS, YOU WORRIED THAT YOUR FOOD WOULD RUN OUT BEFORE YOU GOT MONEY TO BUY MORE.: NEVER TRUE

## 2023-04-10 SDOH — HEALTH STABILITY: MENTAL HEALTH
STRESS IS WHEN SOMEONE FEELS TENSE, NERVOUS, ANXIOUS, OR CAN'T SLEEP AT NIGHT BECAUSE THEIR MIND IS TROUBLED. HOW STRESSED ARE YOU?: ONLY A LITTLE

## 2023-04-10 SDOH — ECONOMIC STABILITY: INCOME INSECURITY: HOW HARD IS IT FOR YOU TO PAY FOR THE VERY BASICS LIKE FOOD, HOUSING, MEDICAL CARE, AND HEATING?: NOT HARD AT ALL

## 2023-04-10 ASSESSMENT — SOCIAL DETERMINANTS OF HEALTH (SDOH)
HOW OFTEN DO YOU HAVE A DRINK CONTAINING ALCOHOL: 4 OR MORE TIMES A WEEK
HOW OFTEN DO YOU ATTEND CHURCH OR RELIGIOUS SERVICES?: NEVER
WITHIN THE PAST 12 MONTHS, YOU WORRIED THAT YOUR FOOD WOULD RUN OUT BEFORE YOU GOT THE MONEY TO BUY MORE: NEVER TRUE
HOW OFTEN DO YOU GET TOGETHER WITH FRIENDS OR RELATIVES?: ONCE A WEEK
IN A TYPICAL WEEK, HOW MANY TIMES DO YOU TALK ON THE PHONE WITH FAMILY, FRIENDS, OR NEIGHBORS?: TWICE A WEEK
HOW OFTEN DO YOU GET TOGETHER WITH FRIENDS OR RELATIVES?: ONCE A WEEK
HOW MANY DRINKS CONTAINING ALCOHOL DO YOU HAVE ON A TYPICAL DAY WHEN YOU ARE DRINKING: 1 OR 2
DO YOU BELONG TO ANY CLUBS OR ORGANIZATIONS SUCH AS CHURCH GROUPS UNIONS, FRATERNAL OR ATHLETIC GROUPS, OR SCHOOL GROUPS?: YES
HOW OFTEN DO YOU ATTEND CHURCH OR RELIGIOUS SERVICES?: NEVER
HOW OFTEN DO YOU ATTENT MEETINGS OF THE CLUB OR ORGANIZATION YOU BELONG TO?: 1 TO 4 TIMES PER YEAR
IN A TYPICAL WEEK, HOW MANY TIMES DO YOU TALK ON THE PHONE WITH FAMILY, FRIENDS, OR NEIGHBORS?: TWICE A WEEK
HOW HARD IS IT FOR YOU TO PAY FOR THE VERY BASICS LIKE FOOD, HOUSING, MEDICAL CARE, AND HEATING?: NOT HARD AT ALL
HOW OFTEN DO YOU HAVE SIX OR MORE DRINKS ON ONE OCCASION: LESS THAN MONTHLY
HOW OFTEN DO YOU ATTENT MEETINGS OF THE CLUB OR ORGANIZATION YOU BELONG TO?: 1 TO 4 TIMES PER YEAR
DO YOU BELONG TO ANY CLUBS OR ORGANIZATIONS SUCH AS CHURCH GROUPS UNIONS, FRATERNAL OR ATHLETIC GROUPS, OR SCHOOL GROUPS?: YES

## 2023-04-10 ASSESSMENT — LIFESTYLE VARIABLES
HOW MANY STANDARD DRINKS CONTAINING ALCOHOL DO YOU HAVE ON A TYPICAL DAY: 1 OR 2
SKIP TO QUESTIONS 9-10: 0
HOW OFTEN DO YOU HAVE SIX OR MORE DRINKS ON ONE OCCASION: LESS THAN MONTHLY
AUDIT-C TOTAL SCORE: 5
HOW OFTEN DO YOU HAVE A DRINK CONTAINING ALCOHOL: 4 OR MORE TIMES A WEEK

## 2023-04-13 ENCOUNTER — OFFICE VISIT (OUTPATIENT)
Dept: MEDICAL GROUP | Facility: MEDICAL CENTER | Age: 63
End: 2023-04-13
Payer: COMMERCIAL

## 2023-04-13 VITALS
HEART RATE: 67 BPM | DIASTOLIC BLOOD PRESSURE: 60 MMHG | HEIGHT: 68 IN | SYSTOLIC BLOOD PRESSURE: 122 MMHG | RESPIRATION RATE: 16 BRPM | OXYGEN SATURATION: 96 % | WEIGHT: 198.41 LBS | TEMPERATURE: 97.9 F | BODY MASS INDEX: 30.07 KG/M2

## 2023-04-13 DIAGNOSIS — E88.810 METABOLIC SYNDROME: ICD-10-CM

## 2023-04-13 DIAGNOSIS — I10 ESSENTIAL HYPERTENSION: ICD-10-CM

## 2023-04-13 DIAGNOSIS — Z11.4 SCREENING FOR HIV (HUMAN IMMUNODEFICIENCY VIRUS): ICD-10-CM

## 2023-04-13 DIAGNOSIS — E66.9 CLASS 1 OBESITY WITH SERIOUS COMORBIDITY AND BODY MASS INDEX (BMI) OF 30.0 TO 30.9 IN ADULT, UNSPECIFIED OBESITY TYPE: ICD-10-CM

## 2023-04-13 DIAGNOSIS — E78.5 DYSLIPIDEMIA: ICD-10-CM

## 2023-04-13 DIAGNOSIS — E55.9 VITAMIN D DEFICIENCY: ICD-10-CM

## 2023-04-13 DIAGNOSIS — Z00.00 ANNUAL PHYSICAL EXAM: ICD-10-CM

## 2023-04-13 PROCEDURE — 99396 PREV VISIT EST AGE 40-64: CPT | Performed by: FAMILY MEDICINE

## 2023-04-13 PROCEDURE — 99214 OFFICE O/P EST MOD 30 MIN: CPT | Mod: 25 | Performed by: FAMILY MEDICINE

## 2023-04-13 ASSESSMENT — ENCOUNTER SYMPTOMS
SPUTUM PRODUCTION: 0
CONSTIPATION: 0
DEPRESSION: 0
WHEEZING: 0
HEMOPTYSIS: 0
EYE PAIN: 0
FOCAL WEAKNESS: 0
HEADACHES: 0
FEVER: 0
SINUS PAIN: 0
DIZZINESS: 0
VOMITING: 0
ABDOMINAL PAIN: 0
SENSORY CHANGE: 0
MYALGIAS: 0
NAUSEA: 0
COUGH: 0
EYE REDNESS: 0
NERVOUS/ANXIOUS: 0
CHILLS: 0
DIARRHEA: 0
PALPITATIONS: 0
SHORTNESS OF BREATH: 0
EYE DISCHARGE: 0

## 2023-04-13 ASSESSMENT — PATIENT HEALTH QUESTIONNAIRE - PHQ9: CLINICAL INTERPRETATION OF PHQ2 SCORE: 0

## 2023-04-13 NOTE — ASSESSMENT & PLAN NOTE
Chronic problem, unstable triglyceride level, counseled regarding eating healthy diet and continue exercise.  Recheck labs in 6 months.  Continue Lipitor 80 mg daily and Zetia 10 mg daily.

## 2023-04-13 NOTE — PROGRESS NOTES
62 y.o. male for annual routine physical exam and lab follow-up.    Chief Complaint.  Chief Complaint   Patient presents with    Annual Exam       History of present illness:    Problem   Vitamin D Deficiency    Vitamin D level improved to 44 from 28.  He is taking vitamin D 6000 international units daily.     Essential Hypertension    Blood pressure today came back at 122/60.  He is on amlodipine benazepril combination.  No side effects of medication.  No chest pain, palpitations, shortness of breath, lower leg swelling.     Dyslipidemia    Recent triglyceride numbers came back elevated.  He changed his diet after seeing his labs.  He is on Lipitor 80 mg daily and Zetia 10 mg daily.  Previous CT cardiac scoring showed cholesterol plaque buildup.  He is on aspirin 81 mg daily  Lab Results   Component Value Date/Time    CHOLSTRLTOT 131 03/30/2023 0919    TRIGLYCERIDE 212 (H) 03/30/2023 0919    HDL 46 03/30/2023 0919    LDL 43 03/30/2023 0919    LDLCALC 50 10/29/2020 0635        Metabolic Syndrome    Recent A1c came back in the prediabetes range at 5.8 increased from previous numbers.  Currently not on medications.  He does exercise 3 miles a day.    Lab Results   Component Value Date/Time    HBA1C 5.8 (H) 03/30/2023 09:19 AM            Health Maintenance  Advanced directive: n/a  Osteoporosis Screen/ DEXA: n/a  PT/vit D for falls prevention: n/a   Cholesterol Screening: Recent lipid panel showed elevated triglyceride level  Diabetes Screening: Recent A1c came back at 5.8.  AAA Screening (65 to 74 year male): n/a     Below anticipatory guidance discussed with patient  Aspirin Use: Taking aspirin 81 mg daily  Diet: Is working on improving his diet  Exercise: Does exercise  Substance Abuse: none  Safe in relationship.yes  Seat belts, bike helmet, gun safety discussed.  Sun protection use discussed.    Cancer screening  Colorectal Cancer Screening: Last colonoscopy in 2019, results negative, repeat in 10 years  Lung  Cancer Screening: Does not meet criteria for lung cancer screening  Prostate Cancer Screening/PSA: Recent PSA level came back normal      Infectious disease screening/Immunizations  --STI Screening: none  --Practices safe sex.  --HIV Screening: Ordered HIV testing  --Hepatitis C Screening: Previous hep C testing negative  --Immunizations: Up-to-date.  Recommended to get COVID-vaccine at pharmacy       Review of systems:    Review of Systems   Constitutional:  Negative for chills, fever and malaise/fatigue.   HENT:  Negative for ear discharge, ear pain, hearing loss and sinus pain.    Eyes:  Negative for pain, discharge and redness.   Respiratory:  Negative for cough, hemoptysis, sputum production, shortness of breath and wheezing.    Cardiovascular:  Negative for chest pain, palpitations and leg swelling.   Gastrointestinal:  Negative for abdominal pain, constipation, diarrhea, nausea and vomiting.   Genitourinary:  Negative for dysuria, frequency and urgency.   Musculoskeletal:  Negative for joint pain and myalgias.   Skin:  Negative for itching and rash.   Neurological:  Negative for dizziness, sensory change, focal weakness and headaches.   Psychiatric/Behavioral:  Negative for depression. The patient is not nervous/anxious.       Medications and Allergies:     Current Outpatient Medications   Medication Sig Dispense Refill    ezetimibe (ZETIA) 10 MG Tab TAKE 1 TABLET BY MOUTH  DAILY 90 Tablet 3    amlodipine-benazepril (LOTREL) 10-20 MG per capsule TAKE 1 CAPSULE BY MOUTH  DAILY 90 Capsule 3    pantoprazole (PROTONIX) 40 MG Tablet Delayed Response       Cholecalciferol (VITAMIN D) 125 MCG (5000 UT) Cap Take  by mouth.      atorvastatin (LIPITOR) 80 MG tablet Take 1 Tablet by mouth every evening. 90 Tablet 3    aspirin (ASA) 81 MG Chew Tab chewable tablet Take 1 Tab by mouth every day. 100 Tab 3    therapeutic multivitamin-minerals (THERAGRAN-M) Tab Take 1 Tab by mouth every day.      Probiotic Product (PROBIOTIC  "FORMULA PO) Take  by mouth.       No current facility-administered medications for this visit.        Allergies   Allergen Reactions    Other Food Itching     Shell fish; Pt states \"at times skin rash and/or puffy/itchy eyes\"       Vitals:    /60   Pulse 67   Temp 36.6 °C (97.9 °F)   Resp 16   Ht 1.727 m (5' 8\")   Wt 90 kg (198 lb 6.6 oz)   SpO2 96%  Body mass index is 30.17 kg/m².    Physical Exam  Constitutional:       Appearance: Normal appearance. He is well-developed and well-groomed.   HENT:      Head: Normocephalic and atraumatic.      Right Ear: Tympanic membrane, ear canal and external ear normal.      Left Ear: Tympanic membrane, ear canal and external ear normal.      Nose: Nose normal.      Mouth/Throat:      Mouth: Mucous membranes are moist.      Pharynx: No oropharyngeal exudate or posterior oropharyngeal erythema.   Eyes:      General:         Right eye: No discharge.         Left eye: No discharge.      Conjunctiva/sclera: Conjunctivae normal.   Neck:      Thyroid: No thyromegaly.   Cardiovascular:      Rate and Rhythm: Normal rate and regular rhythm.      Heart sounds: Normal heart sounds. No murmur heard.  Pulmonary:      Effort: Pulmonary effort is normal. No respiratory distress.      Breath sounds: Normal breath sounds. No wheezing or rales.   Abdominal:      General: Bowel sounds are normal. There is no distension.      Palpations: Abdomen is soft.      Tenderness: There is no abdominal tenderness. There is no guarding.   Musculoskeletal:         General: No deformity. Normal range of motion.      Cervical back: Neck supple.   Skin:     General: Skin is warm.   Neurological:      General: No focal deficit present.      Mental Status: He is alert. Mental status is at baseline.      Gait: Gait is intact.   Psychiatric:         Mood and Affect: Mood and affect normal.         Behavior: Behavior normal.        Labs:  I reviewed recent labs (dated: 3/30/2023) with patient. "     Assessment/Plan:    Víctor was seen today for annual exam.    Diagnoses and all orders for this visit:    Annual physical exam  Up-to-date for cancer screening and immunizations.  Get COVID-vaccine at pharmacy.  Anticipatory guidance discussed above in note    Problem List Items Addressed This Visit       Vitamin D deficiency     Chronic problem, stable, continue vitamin D 6000 international units daily         Metabolic syndrome     Chronic problem, unstable, counseled regarding eating healthy diet recheck labs in 6 months         Relevant Orders    HEMOGLOBIN A1C    Essential hypertension     Chronic problem, stable, continue with plans for combination         Dyslipidemia     Chronic problem, unstable triglyceride level, counseled regarding eating healthy diet and continue exercise.  Recheck labs in 6 months.  Continue Lipitor 80 mg daily and Zetia 10 mg daily.         Relevant Orders    Comp Metabolic Panel    Lipid Profile     I discussed with patient that annual physical covers screening, immunizations,counseling.  Other problem list that was addressed during this visit including diagnostic labs, diagnostic imagings, medications will be billed as a regular visit.  Patient reports understanding of this     Follow up in 1 year for annual physical, 6 months for lab follow-up

## 2023-06-20 ENCOUNTER — TELEMEDICINE (OUTPATIENT)
Dept: MEDICAL GROUP | Facility: MEDICAL CENTER | Age: 63
End: 2023-06-20
Payer: COMMERCIAL

## 2023-06-20 VITALS — BODY MASS INDEX: 28.79 KG/M2 | WEIGHT: 190 LBS | HEIGHT: 68 IN

## 2023-06-20 DIAGNOSIS — E78.5 DYSLIPIDEMIA: ICD-10-CM

## 2023-06-20 DIAGNOSIS — I10 ESSENTIAL HYPERTENSION: ICD-10-CM

## 2023-06-20 DIAGNOSIS — Z12.83 SCREENING FOR SKIN CANCER: ICD-10-CM

## 2023-06-20 PROCEDURE — 99214 OFFICE O/P EST MOD 30 MIN: CPT | Mod: 95 | Performed by: FAMILY MEDICINE

## 2023-06-20 NOTE — PROGRESS NOTES
Virtual Visit: Established Patient   This visit was conducted via Zoom using secure and encrypted videoconferencing technology.   The patient was in their home in the state of [place].    The patient's identity was confirmed and verbal consent was obtained for this virtual visit.     Subjective:   CC:   Chief Complaint   Patient presents with    Referral Needed       Víctor Hollis is a 62 y.o. male for dermatology referral.    He was seen dermatology outside of [facility] and he missed his appointment in February, he would like to establish with dermatologist at [facility].  He denies any skin concerns.    He reports that he was recently diagnosed with COVID and was prescribed molnupiravir medication.  He is doing much better now.  His blood pressure is stable with medication amlodipine benazepril combination.  He is on Lipitor 80 mg daily and Zetia 10 mg daily.  This is the reason that he was not prescribed Paxlovid as Paxlovid interacts with statin.        ROS   Negative for chest pain, palpitations, shortness of breath.    Current medicines (including changes today)  Current Outpatient Medications   Medication Sig Dispense Refill    ezetimibe (ZETIA) 10 MG Tab TAKE 1 TABLET BY MOUTH  DAILY 90 Tablet 3    amlodipine-benazepril (LOTREL) 10-20 MG per capsule TAKE 1 CAPSULE BY MOUTH  DAILY 90 Capsule 3    pantoprazole (PROTONIX) 40 MG Tablet Delayed Response       Cholecalciferol (VITAMIN D) 125 MCG (5000 UT) Cap Take  by mouth.      atorvastatin (LIPITOR) 80 MG tablet Take 1 Tablet by mouth every evening. 90 Tablet 3    aspirin (ASA) 81 MG Chew Tab chewable tablet Take 1 Tab by mouth every day. 100 Tab 3    therapeutic multivitamin-minerals (THERAGRAN-M) Tab Take 1 Tab by mouth every day.      Probiotic Product (PROBIOTIC FORMULA PO) Take  by mouth.       No current facility-administered medications for this visit.       Patient Active Problem List    Diagnosis Date Noted    Hiatal hernia 07/21/2022    Esophagitis on  "biopsy 07/21/2022    Elbow swelling, left 07/21/2022    Claustrophobia 07/08/2022    Healthcare maintenance 02/19/2021    Vitamin D deficiency 07/03/2019    Coronary artery calcification 07/03/2019    S/P cardiac cath 07/03/2019    Essential hypertension 07/03/2019    Dyslipidemia 07/03/2019    Metabolic syndrome 07/03/2019        Objective:   Ht 1.727 m (5' 8\")   Wt 86.2 kg (190 lb)   BMI 28.89 kg/m²     Physical Exam:  Constitutional: Alert, no distress, well-groomed.  Skin: No rashes in visible areas.  Eye: Round. Conjunctiva clear, lids normal. No icterus.   ENMT: Lips pink without lesions, good dentition, moist mucous membranes. Phonation normal.  Neck: No masses, no thyromegaly. Moves freely without pain.  Respiratory: Unlabored respiratory effort, no cough or audible wheeze  Psych: Alert, normal affect and mood.     Assessment and Plan:   The following treatment plan was discussed:     1. Essential hypertension  Chronic problem, stable, continue amlodipine benazepril combination 1020 mg daily.    2. Dyslipidemia  Chronic problem, stable, continue atorvastatin 80 mg daily.    3. Screening for skin cancer  - Referral to Dermatology      Follow-up: Return in about 15 weeks (around 10/3/2023).          "

## 2023-07-19 ENCOUNTER — OFFICE VISIT (OUTPATIENT)
Dept: SPORTS MEDICINE | Facility: CLINIC | Age: 63
End: 2023-07-19
Payer: COMMERCIAL

## 2023-07-19 VITALS
DIASTOLIC BLOOD PRESSURE: 80 MMHG | SYSTOLIC BLOOD PRESSURE: 138 MMHG | HEART RATE: 78 BPM | OXYGEN SATURATION: 96 % | WEIGHT: 190 LBS | RESPIRATION RATE: 18 BRPM | HEIGHT: 68 IN | BODY MASS INDEX: 28.79 KG/M2 | TEMPERATURE: 98.2 F

## 2023-07-19 DIAGNOSIS — S86.811A STRAIN OF RIGHT CALF MUSCLE: ICD-10-CM

## 2023-07-19 PROCEDURE — 3079F DIAST BP 80-89 MM HG: CPT | Performed by: FAMILY MEDICINE

## 2023-07-19 PROCEDURE — 99213 OFFICE O/P EST LOW 20 MIN: CPT | Performed by: FAMILY MEDICINE

## 2023-07-19 PROCEDURE — 3075F SYST BP GE 130 - 139MM HG: CPT | Performed by: FAMILY MEDICINE

## 2023-07-19 RX ORDER — DICLOFENAC SODIUM 75 MG/1
75 TABLET, DELAYED RELEASE ORAL 2 TIMES DAILY
Qty: 28 TABLET | Refills: 0 | Status: SHIPPED | OUTPATIENT
Start: 2023-07-19 | End: 2023-08-02

## 2023-07-19 NOTE — PROGRESS NOTES
"Subjective:     Víctor Hollis is a 63 y.o. male who presents for Leg Pain (R leg pain )    HPI  Pt presents for evaluation of an acute problem  Pt stepping down from a ladder at his home  As he stepped down, felt a pop in the upper calf on right   Has had some swelling of the foot   Had a lot of swelling in the calf, but that is improving   Pain is worse first thing in the morning and also when stretching the area   Pain currently is more in the mid calf  Pain is little worse medially  No weakness or difficulties ambulating  No numbness or tingling  Swelling in the calf is improved, but does have a little bit of swelling in the foot now    Review of Systems   Constitutional:  Negative for fever.   Skin:  Negative for rash.       PMH:  has a past medical history of Allergy, Elevated coronary artery calcium score, GERD (gastroesophageal reflux disease), Hiatal hernia, Hyperlipidemia, and Hypertension.  MEDS:   Current Outpatient Medications:     diclofenac DR (VOLTAREN) 75 MG Tablet Delayed Response, Take 1 Tablet by mouth 2 times a day for 14 days., Disp: 28 Tablet, Rfl: 0    ezetimibe (ZETIA) 10 MG Tab, TAKE 1 TABLET BY MOUTH  DAILY, Disp: 90 Tablet, Rfl: 3    amlodipine-benazepril (LOTREL) 10-20 MG per capsule, TAKE 1 CAPSULE BY MOUTH  DAILY, Disp: 90 Capsule, Rfl: 3    pantoprazole (PROTONIX) 40 MG Tablet Delayed Response, , Disp: , Rfl:     Cholecalciferol (VITAMIN D) 125 MCG (5000 UT) Cap, Take  by mouth., Disp: , Rfl:     atorvastatin (LIPITOR) 80 MG tablet, Take 1 Tablet by mouth every evening., Disp: 90 Tablet, Rfl: 3    aspirin (ASA) 81 MG Chew Tab chewable tablet, Take 1 Tab by mouth every day., Disp: 100 Tab, Rfl: 3    therapeutic multivitamin-minerals (THERAGRAN-M) Tab, Take 1 Tab by mouth every day., Disp: , Rfl:     Probiotic Product (PROBIOTIC FORMULA PO), Take  by mouth., Disp: , Rfl:   ALLERGIES:   Allergies   Allergen Reactions    Other Food Itching     Shell fish; Pt states \"at times skin " "rash and/or puffy/itchy eyes\"     SURGHX:   Past Surgical History:   Procedure Laterality Date    [facility] CARDIAC CATH  06/05/2019    non obstructive disease.    TENDON REPAIR Right 2008     SOCHX:  reports that he quit smoking about 40 years ago. His smoking use included cigarettes. He started smoking about 43 years ago. He has a 0.75 pack-year smoking history. He has never used smokeless tobacco. He reports current alcohol use of about 12.6 oz of alcohol per week. He reports that he does not use drugs.     Objective:   /80 (BP Location: Left arm, Patient Position: Sitting, BP Cuff Size: Adult)   Pulse 78   Temp 36.8 °C (98.2 °F) (Temporal)   Resp 18   Ht 1.727 m (5' 8\")   Wt 86.2 kg (190 lb)   SpO2 96%   BMI 28.89 kg/m²     Physical Exam  Constitutional:       General: He is not in acute distress.     Appearance: He is well-developed. He is not diaphoretic.   Pulmonary:      Effort: Pulmonary effort is normal.   Neurological:      Mental Status: He is alert.     Right lower extremity   Appearance - No bruising, erythema, or deformity appreciated throughout calf. Has mild nonpitting edema in foot  Palpation - +TTP over the mid calf with no tenderness at the proximal calf, posterior knee, or distal calf at the Achilles tendon  ROM - FROM  Strength - 5/5 throughout  Neuro - Sensation intact   Special testing - Negative Markus's     Assessment/Plan:   Assessment    1. Strain of right calf muscle    Other orders  - diclofenac DR (VOLTAREN) 75 MG Tablet Delayed Response; Take 1 Tablet by mouth 2 times a day for 14 days.  Dispense: 28 Tablet; Refill: 0    Patient with a right calf muscle strain.  He retains great strength, good range of motion, and good function.  No sign of a large muscular tear.  He has a clear mechanism of injury and no red flag symptoms.  No concern for DVT at this time.  Did not recommend advanced imaging quite yet since function is good and symptoms are improving.  Plan to follow-up if not " resolving in 2 to 3 weeks.

## 2023-07-21 ASSESSMENT — ENCOUNTER SYMPTOMS: FEVER: 0

## 2023-08-26 ENCOUNTER — OFFICE VISIT (OUTPATIENT)
Dept: URGENT CARE | Facility: CLINIC | Age: 63
End: 2023-08-26
Payer: COMMERCIAL

## 2023-08-26 VITALS
BODY MASS INDEX: 28.49 KG/M2 | HEART RATE: 80 BPM | TEMPERATURE: 98.7 F | RESPIRATION RATE: 14 BRPM | WEIGHT: 188 LBS | DIASTOLIC BLOOD PRESSURE: 52 MMHG | SYSTOLIC BLOOD PRESSURE: 128 MMHG | HEIGHT: 68 IN | OXYGEN SATURATION: 96 %

## 2023-08-26 DIAGNOSIS — M79.672 LEFT FOOT PAIN: ICD-10-CM

## 2023-08-26 PROCEDURE — 99213 OFFICE O/P EST LOW 20 MIN: CPT | Performed by: NURSE PRACTITIONER

## 2023-08-26 PROCEDURE — 3078F DIAST BP <80 MM HG: CPT | Performed by: NURSE PRACTITIONER

## 2023-08-26 PROCEDURE — 3074F SYST BP LT 130 MM HG: CPT | Performed by: NURSE PRACTITIONER

## 2023-08-26 RX ORDER — METHYLPREDNISOLONE 4 MG/1
TABLET ORAL
Qty: 21 TABLET | Refills: 0 | Status: SHIPPED | OUTPATIENT
Start: 2023-08-26 | End: 2023-10-03

## 2023-08-26 ASSESSMENT — ENCOUNTER SYMPTOMS
FEVER: 0
JOINT SWELLING: 1
NUMBNESS: 0

## 2023-08-26 NOTE — PROGRESS NOTES
"  Subjective:     Víctor Hollis is a 63 y.o. male who presents for Foot Problem (Swelling on ankle/ bridge, painful to walk it x Wednesday)      Presents for left foot pain, starting on Wednesday. Denies an injury. States he woke up with pain.  The area has reportedly been warm to touch. Denies numbness.  Pain is increased with walking and rotating the ankle/foot. States this pain is very similar to what he had experienced in 2022; which resolved with steroid therapy. Has been wearing the short walking boot he was previously given. \"A little better\". Denies hx of gout.               Foot Problem  This is a recurrent problem. The current episode started in the past 7 days. Associated symptoms include joint swelling. Pertinent negatives include no fever or numbness. The symptoms are aggravated by bending and walking. He has tried ice and NSAIDs for the symptoms.       Past Medical History:   Diagnosis Date    Allergy     Elevated coronary artery calcium score     Treated with statin and Zetia. 2019: Coronary angiogram with nonobstructive CAD, LVEF 65%    GERD (gastroesophageal reflux disease)     Hiatal hernia     Hyperlipidemia     Hypertension        Past Surgical History:   Procedure Laterality Date    [facility] CARDIAC CATH  2019    non obstructive disease.    TENDON REPAIR Right        Social History     Socioeconomic History    Marital status:      Spouse name: Not on file    Number of children: Not on file    Years of education: Not on file    Highest education level: Bachelor's degree (e.g., BA, AB, BS)   Occupational History    Not on file   Tobacco Use    Smoking status: Former     Current packs/day: 0.00     Average packs/day: 0.3 packs/day for 3.0 years (0.8 ttl pk-yrs)     Types: Cigarettes     Start date:      Quit date:      Years since quittin.6    Smokeless tobacco: Never   Vaping Use    Vaping Use: Never used   Substance and Sexual Activity    Alcohol use: Yes "     Alcohol/week: 12.6 oz     Types: 21 Standard drinks or equivalent per week     Comment: 2-3 drinks/day     Drug use: No    Sexual activity: Yes     Partners: Female   Other Topics Concern    Not on file   Social History Narrative    Not on file     Social Determinants of Health     Financial Resource Strain: Low Risk  (4/10/2023)    Overall Financial Resource Strain (CARDIA)     Difficulty of Paying Living Expenses: Not hard at all   Food Insecurity: No Food Insecurity (4/10/2023)    Hunger Vital Sign     Worried About Running Out of Food in the Last Year: Never true     Ran Out of Food in the Last Year: Never true   Transportation Needs: No Transportation Needs (4/10/2023)    PRAPARE - Transportation     Lack of Transportation (Medical): No     Lack of Transportation (Non-Medical): No   Physical Activity: Sufficiently Active (4/10/2023)    Exercise Vital Sign     Days of Exercise per Week: 5 days     Minutes of Exercise per Session: 30 min   Stress: No Stress Concern Present (4/10/2023)    [employer] [place] [employer] - Occupational Stress Questionnaire     Feeling of Stress : Only a little   Social Connections: Moderately Integrated (4/10/2023)    Social Connection and Isolation Panel [NHANES]     Frequency of Communication with Friends and Family: Twice a week     Frequency of Social Gatherings with Friends and Family: Once a week     Attends Buddhism Services: Never     Active Member of Clubs or Organizations: Yes     Attends Club or Organization Meetings: 1 to 4 times per year     Marital Status:    Intimate Partner Violence: Not on file   Housing Stability: Low Risk  (4/10/2023)    Housing Stability Vital Sign     Unable to Pay for Housing in the Last Year: No     Number of Places Lived in the Last Year: 1     Unstable Housing in the Last Year: No        Family History   Problem Relation Age of Onset    Hypertension Mother     Diabetes Father     Hypertension Sister     Hypertension  "Brother     Diabetes Brother     Hypertension Son         Allergies   Allergen Reactions    Other Food Itching     Shell fish; Pt states \"at times skin rash and/or puffy/itchy eyes\"       Review of Systems   Constitutional:  Negative for fever.   Musculoskeletal:  Positive for joint pain and joint swelling.   Neurological:  Negative for numbness.   All other systems reviewed and are negative.       Objective:   /52 (BP Location: Left arm, Patient Position: Sitting, BP Cuff Size: Adult)   Pulse 80   Temp 37.1 °C (98.7 °F) (Temporal)   Resp 14   Ht 1.727 m (5' 8\")   Wt 85.3 kg (188 lb)   SpO2 96%   BMI 28.59 kg/m²     Physical Exam  Vitals reviewed.   Constitutional:       General: He is not in acute distress.     Appearance: He is not toxic-appearing.   Cardiovascular:      Pulses:           Dorsalis pedis pulses are 2+ on the left side.   Pulmonary:      Effort: Pulmonary effort is normal.   Musculoskeletal:         General: Swelling and tenderness present.      Left foot: Normal capillary refill. Tenderness present.   Feet:      Left foot:      Skin integrity: No skin breakdown.      Comments: Tenderness to palpation to left lateral proximal foot, over proximal 4th and 5th metatarsal. Swelling. No erythema.   Skin:     General: Skin is warm and dry.      Capillary Refill: Capillary refill takes less than 2 seconds.      Findings: No bruising or erythema.   Neurological:      Mental Status: He is oriented to person, place, and time.         Assessment/Plan:   1. Left foot pain  - methylPREDNISolone (MEDROL DOSEPAK) 4 MG Tablet Therapy Pack; Follow schedule on package instructions.  Dispense: 21 Tablet; Refill: 0  - Referral to Sports Medicine  -Can also take tylenol as directed for pain.   -RICE Therapy: Rest, Ice, Compression, Elevation  -Limited weight bearing for 2-3 days;  and/or weight bearing as tolerated.   -Compression with an elastic bandage for swelling.  -Gentle range of motion " exercises.    Follow up with PCP. Follow up emergently for severe uncontrolled pain, neurovascular compromise (decreased sensation, motion, or circulation).    -Presents with acute foot pain. Denies traumatic injury. Distal neurovascular is intact. Discussed initial pain management measures, with follow up for persistent or worsening symptoms.     Differential diagnosis, natural history, supportive care, and indications for immediate follow-up discussed.

## 2023-08-27 NOTE — PATIENT INSTRUCTIONS
-Can also take tylenol as directed for pain.   -RICE Therapy: Rest, Ice, Compression, Elevation  -Limited weight bearing for 2-3 days;  and/or weight bearing as tolerated.   -Compression with an elastic bandage for swelling.  -Gentle range of motion exercises.    Follow up with PCP. Follow up emergently for severe uncontrolled pain, neurovascular compromise (decreased sensation, motion, or circulation).

## 2023-08-29 ENCOUNTER — APPOINTMENT (RX ONLY)
Dept: URBAN - METROPOLITAN AREA CLINIC 6 | Facility: CLINIC | Age: 63
Setting detail: DERMATOLOGY
End: 2023-08-29

## 2023-08-29 DIAGNOSIS — D22 MELANOCYTIC NEVI: ICD-10-CM

## 2023-08-29 DIAGNOSIS — L81.8 OTHER SPECIFIED DISORDERS OF PIGMENTATION: ICD-10-CM

## 2023-08-29 DIAGNOSIS — L82.1 OTHER SEBORRHEIC KERATOSIS: ICD-10-CM

## 2023-08-29 DIAGNOSIS — D18.0 HEMANGIOMA: ICD-10-CM

## 2023-08-29 DIAGNOSIS — L81.4 OTHER MELANIN HYPERPIGMENTATION: ICD-10-CM

## 2023-08-29 DIAGNOSIS — L73.8 OTHER SPECIFIED FOLLICULAR DISORDERS: ICD-10-CM

## 2023-08-29 DIAGNOSIS — Z71.89 OTHER SPECIFIED COUNSELING: ICD-10-CM

## 2023-08-29 PROBLEM — D22.61 MELANOCYTIC NEVI OF RIGHT UPPER LIMB, INCLUDING SHOULDER: Status: ACTIVE | Noted: 2023-08-29

## 2023-08-29 PROBLEM — D22.72 MELANOCYTIC NEVI OF LEFT LOWER LIMB, INCLUDING HIP: Status: ACTIVE | Noted: 2023-08-29

## 2023-08-29 PROBLEM — D22.62 MELANOCYTIC NEVI OF LEFT UPPER LIMB, INCLUDING SHOULDER: Status: ACTIVE | Noted: 2023-08-29

## 2023-08-29 PROBLEM — D18.01 HEMANGIOMA OF SKIN AND SUBCUTANEOUS TISSUE: Status: ACTIVE | Noted: 2023-08-29

## 2023-08-29 PROBLEM — D22.5 MELANOCYTIC NEVI OF TRUNK: Status: ACTIVE | Noted: 2023-08-29

## 2023-08-29 PROBLEM — D48.5 NEOPLASM OF UNCERTAIN BEHAVIOR OF SKIN: Status: ACTIVE | Noted: 2023-08-29

## 2023-08-29 PROBLEM — D22.71 MELANOCYTIC NEVI OF RIGHT LOWER LIMB, INCLUDING HIP: Status: ACTIVE | Noted: 2023-08-29

## 2023-08-29 PROBLEM — D22.4 MELANOCYTIC NEVI OF SCALP AND NECK: Status: ACTIVE | Noted: 2023-08-29

## 2023-08-29 PROBLEM — D22.39 MELANOCYTIC NEVI OF OTHER PARTS OF FACE: Status: ACTIVE | Noted: 2023-08-29

## 2023-08-29 PROCEDURE — 99213 OFFICE O/P EST LOW 20 MIN: CPT | Mod: 25

## 2023-08-29 PROCEDURE — ? COUNSELING

## 2023-08-29 PROCEDURE — ? SUNSCREEN TREATMENT REGIMEN

## 2023-08-29 PROCEDURE — ? BIOPSY BY SHAVE METHOD

## 2023-08-29 PROCEDURE — 11102 TANGNTL BX SKIN SINGLE LES: CPT

## 2023-08-29 ASSESSMENT — LOCATION SIMPLE DESCRIPTION DERM
LOCATION SIMPLE: SCALP
LOCATION SIMPLE: LEFT POSTERIOR THIGH
LOCATION SIMPLE: LEFT THIGH
LOCATION SIMPLE: LEFT FOREARM
LOCATION SIMPLE: LEFT SHOULDER
LOCATION SIMPLE: RIGHT FOREARM
LOCATION SIMPLE: RIGHT FOREHEAD
LOCATION SIMPLE: INFERIOR FOREHEAD
LOCATION SIMPLE: RIGHT UPPER ARM
LOCATION SIMPLE: LEFT UPPER ARM
LOCATION SIMPLE: LEFT UPPER BACK
LOCATION SIMPLE: RIGHT THIGH
LOCATION SIMPLE: RIGHT POSTERIOR THIGH
LOCATION SIMPLE: RIGHT HAND
LOCATION SIMPLE: RIGHT CHEEK
LOCATION SIMPLE: POSTERIOR NECK
LOCATION SIMPLE: LEFT EYEBROW
LOCATION SIMPLE: LEFT SCALP
LOCATION SIMPLE: CHEST
LOCATION SIMPLE: LEFT CHEEK

## 2023-08-29 ASSESSMENT — LOCATION DETAILED DESCRIPTION DERM
LOCATION DETAILED: LEFT INFERIOR CENTRAL MALAR CHEEK
LOCATION DETAILED: LEFT POSTERIOR SHOULDER
LOCATION DETAILED: LEFT PROXIMAL POSTERIOR UPPER ARM
LOCATION DETAILED: LEFT DISTAL POSTERIOR THIGH
LOCATION DETAILED: LEFT MEDIAL FRONTAL SCALP
LOCATION DETAILED: RIGHT MEDIAL TRAPEZIAL NECK
LOCATION DETAILED: LEFT SUPERIOR LATERAL UPPER BACK
LOCATION DETAILED: RIGHT LATERAL SUPERIOR CHEST
LOCATION DETAILED: RIGHT CENTRAL MALAR CHEEK
LOCATION DETAILED: RIGHT INFERIOR MEDIAL FOREHEAD
LOCATION DETAILED: RIGHT ANTERIOR DISTAL UPPER ARM
LOCATION DETAILED: LEFT ANTERIOR PROXIMAL THIGH
LOCATION DETAILED: RIGHT RADIAL PALM
LOCATION DETAILED: LEFT VENTRAL PROXIMAL FOREARM
LOCATION DETAILED: RIGHT DISTAL POSTERIOR THIGH
LOCATION DETAILED: LEFT MEDIAL SUPERIOR CHEST
LOCATION DETAILED: RIGHT PROXIMAL DORSAL FOREARM
LOCATION DETAILED: RIGHT VENTRAL DISTAL FOREARM
LOCATION DETAILED: LEFT LATERAL EYEBROW
LOCATION DETAILED: RIGHT DISTAL POSTERIOR UPPER ARM
LOCATION DETAILED: LEFT VENTRAL DISTAL FOREARM
LOCATION DETAILED: LEFT SUPERIOR PARIETAL SCALP
LOCATION DETAILED: RIGHT MEDIAL INFERIOR CHEST
LOCATION DETAILED: INFERIOR MID FOREHEAD
LOCATION DETAILED: RIGHT ANTERIOR PROXIMAL THIGH
LOCATION DETAILED: RIGHT VENTRAL PROXIMAL FOREARM
LOCATION DETAILED: LEFT ANTERIOR DISTAL UPPER ARM
LOCATION DETAILED: LEFT ANTERIOR LATERAL PROXIMAL THIGH
LOCATION DETAILED: LEFT PROXIMAL DORSAL FOREARM

## 2023-08-29 ASSESSMENT — LOCATION ZONE DERM
LOCATION ZONE: LEG
LOCATION ZONE: ARM
LOCATION ZONE: NECK
LOCATION ZONE: HAND
LOCATION ZONE: SCALP
LOCATION ZONE: TRUNK
LOCATION ZONE: FACE

## 2023-09-06 ENCOUNTER — TELEPHONE (OUTPATIENT)
Dept: SCHEDULING | Facility: IMAGING CENTER | Age: 63
End: 2023-09-06
Payer: COMMERCIAL

## 2023-09-18 ENCOUNTER — TELEPHONE (OUTPATIENT)
Dept: CARDIOLOGY | Facility: MEDICAL CENTER | Age: 63
End: 2023-09-18
Payer: COMMERCIAL

## 2023-09-18 ENCOUNTER — PATIENT MESSAGE (OUTPATIENT)
Dept: MEDICAL GROUP | Facility: MEDICAL CENTER | Age: 63
End: 2023-09-18
Payer: COMMERCIAL

## 2023-09-18 DIAGNOSIS — F40.240 CLAUSTROPHOBIA: ICD-10-CM

## 2023-09-18 NOTE — PATIENT COMMUNICATION
Received request via: Patient    Was the patient seen in the last year in this department? Yes    Does the patient have an active prescription (recently filled or refills available) for medication(s) requested? No    Does the patient have skilled nursing Plus and need 100 day supply (blood pressure, diabetes and cholesterol meds only)? Patient does not have SCP

## 2023-09-19 RX ORDER — HYDROXYZINE HYDROCHLORIDE 25 MG/1
25 TABLET, FILM COATED ORAL EVERY 8 HOURS PRN
Qty: 90 TABLET | Refills: 3 | Status: SHIPPED | OUTPATIENT
Start: 2023-09-19 | End: 2024-01-02

## 2023-09-27 ENCOUNTER — HOSPITAL ENCOUNTER (OUTPATIENT)
Dept: LAB | Facility: MEDICAL CENTER | Age: 63
End: 2023-09-27
Attending: FAMILY MEDICINE
Payer: COMMERCIAL

## 2023-09-27 DIAGNOSIS — E78.5 DYSLIPIDEMIA: ICD-10-CM

## 2023-09-27 DIAGNOSIS — E88.810 METABOLIC SYNDROME: ICD-10-CM

## 2023-09-27 DIAGNOSIS — Z11.4 SCREENING FOR HIV (HUMAN IMMUNODEFICIENCY VIRUS): ICD-10-CM

## 2023-09-27 LAB
ALBUMIN SERPL BCP-MCNC: 4.5 G/DL (ref 3.2–4.9)
ALBUMIN/GLOB SERPL: 1.8 G/DL
ALP SERPL-CCNC: 77 U/L (ref 30–99)
ALT SERPL-CCNC: 22 U/L (ref 2–50)
ANION GAP SERPL CALC-SCNC: 12 MMOL/L (ref 7–16)
AST SERPL-CCNC: 22 U/L (ref 12–45)
BILIRUB SERPL-MCNC: 0.6 MG/DL (ref 0.1–1.5)
BUN SERPL-MCNC: 12 MG/DL (ref 8–22)
CALCIUM ALBUM COR SERPL-MCNC: 8.3 MG/DL (ref 8.5–10.5)
CALCIUM SERPL-MCNC: 8.7 MG/DL (ref 8.4–10.2)
CHLORIDE SERPL-SCNC: 105 MMOL/L (ref 96–112)
CHOLEST SERPL-MCNC: 119 MG/DL (ref 100–199)
CO2 SERPL-SCNC: 24 MMOL/L (ref 20–33)
CREAT SERPL-MCNC: 0.76 MG/DL (ref 0.5–1.4)
EST. AVERAGE GLUCOSE BLD GHB EST-MCNC: 117 MG/DL
FASTING STATUS PATIENT QL REPORTED: NORMAL
GFR SERPLBLD CREATININE-BSD FMLA CKD-EPI: 100 ML/MIN/1.73 M 2
GLOBULIN SER CALC-MCNC: 2.5 G/DL (ref 1.9–3.5)
GLUCOSE SERPL-MCNC: 121 MG/DL (ref 65–99)
HBA1C MFR BLD: 5.7 % (ref 4–5.6)
HDLC SERPL-MCNC: 42 MG/DL
HIV 1+2 AB+HIV1 P24 AG SERPL QL IA: NORMAL
LDLC SERPL CALC-MCNC: 34 MG/DL
POTASSIUM SERPL-SCNC: 3.7 MMOL/L (ref 3.6–5.5)
PROT SERPL-MCNC: 7 G/DL (ref 6–8.2)
SODIUM SERPL-SCNC: 141 MMOL/L (ref 135–145)
TRIGL SERPL-MCNC: 213 MG/DL (ref 0–149)

## 2023-09-27 PROCEDURE — 80061 LIPID PANEL: CPT

## 2023-09-27 PROCEDURE — 80053 COMPREHEN METABOLIC PANEL: CPT

## 2023-09-27 PROCEDURE — 83036 HEMOGLOBIN GLYCOSYLATED A1C: CPT

## 2023-09-27 PROCEDURE — 87389 HIV-1 AG W/HIV-1&-2 AB AG IA: CPT

## 2023-09-27 PROCEDURE — 36415 COLL VENOUS BLD VENIPUNCTURE: CPT

## 2023-10-03 ENCOUNTER — OFFICE VISIT (OUTPATIENT)
Dept: MEDICAL GROUP | Facility: MEDICAL CENTER | Age: 63
End: 2023-10-03
Payer: COMMERCIAL

## 2023-10-03 VITALS
SYSTOLIC BLOOD PRESSURE: 128 MMHG | TEMPERATURE: 98 F | DIASTOLIC BLOOD PRESSURE: 68 MMHG | HEART RATE: 68 BPM | RESPIRATION RATE: 16 BRPM | BODY MASS INDEX: 30.41 KG/M2 | HEIGHT: 68 IN | WEIGHT: 200.62 LBS | OXYGEN SATURATION: 96 %

## 2023-10-03 DIAGNOSIS — Z12.5 SCREENING FOR PROSTATE CANCER: ICD-10-CM

## 2023-10-03 DIAGNOSIS — Z23 NEED FOR VACCINATION: ICD-10-CM

## 2023-10-03 DIAGNOSIS — E78.5 DYSLIPIDEMIA: ICD-10-CM

## 2023-10-03 DIAGNOSIS — E55.9 VITAMIN D DEFICIENCY: ICD-10-CM

## 2023-10-03 DIAGNOSIS — R73.03 PREDIABETES: ICD-10-CM

## 2023-10-03 PROCEDURE — 90686 IIV4 VACC NO PRSV 0.5 ML IM: CPT | Performed by: FAMILY MEDICINE

## 2023-10-03 PROCEDURE — 3078F DIAST BP <80 MM HG: CPT | Performed by: FAMILY MEDICINE

## 2023-10-03 PROCEDURE — 90471 IMMUNIZATION ADMIN: CPT | Performed by: FAMILY MEDICINE

## 2023-10-03 PROCEDURE — 3074F SYST BP LT 130 MM HG: CPT | Performed by: FAMILY MEDICINE

## 2023-10-03 PROCEDURE — 99214 OFFICE O/P EST MOD 30 MIN: CPT | Mod: 25 | Performed by: FAMILY MEDICINE

## 2023-10-03 ASSESSMENT — ENCOUNTER SYMPTOMS
PALPITATIONS: 0
FEVER: 0
CHILLS: 0

## 2023-10-04 NOTE — ASSESSMENT & PLAN NOTE
Chronic problem, elevated triglyceride level, continue to work on diet and exercise.  Recheck labs in 6 months

## 2023-10-04 NOTE — PROGRESS NOTES
FAMILY MEDICINE VISIT                                                               Chief complaint::Diagnoses of Vitamin D deficiency, Dyslipidemia, Prediabetes, Screening for prostate cancer, and Need for vaccination were pertinent to this visit.    History of present illness: Víctor Hollis is a 63 y.o. male who presented for lab follow up.    Problem   Prediabetes    His A1c improved to 5.7 from 5.8.  He reports that he was not doing physical activity for about 2 and half months because of calf Injury      Lab Results   Component Value Date/Time    HBA1C 5.7 (H) 09/27/2023 08:39 AM         Vitamin D Deficiency    Vitamin D level improved to 44 from 28.  He is taking vitamin D 8000 international units daily.     Dyslipidemia    Recent triglyceride numbers came back elevated.  He changed his diet after seeing his labs.  He is on Lipitor 80 mg daily and Zetia 10 mg daily.  Previous CT cardiac scoring showed cholesterol plaque buildup.  He is on aspirin 81 mg daily    Lab Results   Component Value Date/Time    CHOLSTRLTOT 119 09/27/2023 0839    TRIGLYCERIDE 213 (H) 09/27/2023 0839    HDL 42 09/27/2023 0839    LDL 34 09/27/2023 0839    LDLCALC 50 10/29/2020 0635               Review of systems:     Review of Systems   Constitutional:  Negative for chills and fever.   Cardiovascular:  Negative for chest pain, palpitations and leg swelling.        Medications and Allergies:     Current Outpatient Medications   Medication Sig Dispense Refill    hydrOXYzine HCl (ATARAX) 25 MG Tab Take 1 Tablet by mouth every 8 hours as needed for Anxiety. 90 Tablet 3    ezetimibe (ZETIA) 10 MG Tab TAKE 1 TABLET BY MOUTH  DAILY 90 Tablet 3    amlodipine-benazepril (LOTREL) 10-20 MG per capsule TAKE 1 CAPSULE BY MOUTH  DAILY 90 Capsule 3    pantoprazole (PROTONIX) 40 MG Tablet Delayed Response       Cholecalciferol (VITAMIN D) 125 MCG (5000 UT) Cap Take  by mouth.      atorvastatin (LIPITOR) 80 MG tablet Take 1 Tablet by mouth every  "evening. 90 Tablet 3    aspirin (ASA) 81 MG Chew Tab chewable tablet Take 1 Tab by mouth every day. 100 Tab 3    therapeutic multivitamin-minerals (THERAGRAN-M) Tab Take 1 Tab by mouth every day.      Probiotic Product (PROBIOTIC FORMULA PO) Take  by mouth.       No current facility-administered medications for this visit.          Vitals:    /68   Pulse 68   Temp 36.7 °C (98 °F)   Resp 16   Ht 1.727 m (5' 8\")   Wt 91 kg (200 lb 9.9 oz)   SpO2 96%  Body mass index is 30.5 kg/m².    Physical Exam:     Physical Exam  Constitutional:       Appearance: Normal appearance. He is well-developed and well-groomed.   HENT:      Head: Normocephalic and atraumatic.      Right Ear: External ear normal.      Left Ear: External ear normal.   Eyes:      General:         Right eye: No discharge.         Left eye: No discharge.      Conjunctiva/sclera: Conjunctivae normal.   Cardiovascular:      Rate and Rhythm: Normal rate.   Pulmonary:      Effort: Pulmonary effort is normal. No respiratory distress.   Musculoskeletal:      Cervical back: Neck supple.   Skin:     Findings: No rash.   Neurological:      Mental Status: He is alert.   Psychiatric:         Mood and Affect: Mood and affect normal.         Behavior: Behavior normal.          Labs:  I reviewed with patient recent labs resulted on 9/27/2023.    Assessment/Plan:         Problem List Items Addressed This Visit       Vitamin D deficiency     Chronic problem, stable, continue same vitamin D supplementation.  Recheck labs in 6 months.         Relevant Orders    VITAMIN D,25 HYDROXY (DEFICIENCY)    Dyslipidemia     Chronic problem, elevated triglyceride level, continue to work on diet and exercise.  Recheck labs in 6 months         Relevant Orders    Comp Metabolic Panel    Lipid Profile    Prediabetes     Chronic problem, mildly elevated A1c, continue to eat healthy diet and exercise.  Recheck labs in 6-month         Relevant Orders    HEMOGLOBIN A1C     Other Visit " Diagnoses       Screening for prostate cancer        Relevant Orders    PROSTATE SPECIFIC AG SCREENING    Need for vaccination        Relevant Orders    INFLUENZA VACCINE QUAD INJ (PF) (Completed)             Please note that this dictation was created using voice recognition software. I have made every reasonable attempt to correct obvious errors, but I expect that there are errors of grammar and possibly content that I did not discover before finalizing the note.    Follow up in 6 months for lab follow up.

## 2023-10-04 NOTE — ASSESSMENT & PLAN NOTE
Chronic problem, mildly elevated A1c, continue to eat healthy diet and exercise.  Recheck labs in 6-month

## 2023-10-30 RX ORDER — AMLODIPINE BESYLATE AND BENAZEPRIL HYDROCHLORIDE 10; 20 MG/1; MG/1
1 CAPSULE ORAL DAILY
Qty: 90 CAPSULE | Refills: 3 | Status: SHIPPED | OUTPATIENT
Start: 2023-10-30 | End: 2023-11-05 | Stop reason: SDUPTHER

## 2023-11-06 RX ORDER — AMLODIPINE BESYLATE AND BENAZEPRIL HYDROCHLORIDE 10; 20 MG/1; MG/1
1 CAPSULE ORAL DAILY
Qty: 90 CAPSULE | Refills: 3 | Status: SHIPPED | OUTPATIENT
Start: 2023-11-06 | End: 2024-01-08 | Stop reason: SDUPTHER

## 2023-11-06 RX ORDER — PANTOPRAZOLE SODIUM 40 MG/1
40 TABLET, DELAYED RELEASE ORAL
Qty: 90 TABLET | Refills: 3 | Status: SHIPPED | OUTPATIENT
Start: 2023-11-06 | End: 2024-02-23 | Stop reason: SDUPTHER

## 2024-01-02 ENCOUNTER — OFFICE VISIT (OUTPATIENT)
Dept: CARDIOLOGY | Facility: MEDICAL CENTER | Age: 64
End: 2024-01-02
Attending: NURSE PRACTITIONER
Payer: COMMERCIAL

## 2024-01-02 VITALS
RESPIRATION RATE: 12 BRPM | HEART RATE: 64 BPM | OXYGEN SATURATION: 97 % | DIASTOLIC BLOOD PRESSURE: 62 MMHG | WEIGHT: 202 LBS | SYSTOLIC BLOOD PRESSURE: 122 MMHG | HEIGHT: 68 IN | BODY MASS INDEX: 30.62 KG/M2

## 2024-01-02 DIAGNOSIS — E88.810 METABOLIC SYNDROME: ICD-10-CM

## 2024-01-02 DIAGNOSIS — I10 ESSENTIAL HYPERTENSION: ICD-10-CM

## 2024-01-02 DIAGNOSIS — I25.84 CORONARY ARTERY CALCIFICATION: ICD-10-CM

## 2024-01-02 DIAGNOSIS — I25.10 CORONARY ARTERY CALCIFICATION: ICD-10-CM

## 2024-01-02 DIAGNOSIS — E78.5 DYSLIPIDEMIA: ICD-10-CM

## 2024-01-02 DIAGNOSIS — Z98.890 S/P CARDIAC CATH: ICD-10-CM

## 2024-01-02 PROCEDURE — 99212 OFFICE O/P EST SF 10 MIN: CPT | Performed by: NURSE PRACTITIONER

## 2024-01-02 PROCEDURE — 3074F SYST BP LT 130 MM HG: CPT | Performed by: NURSE PRACTITIONER

## 2024-01-02 PROCEDURE — 99214 OFFICE O/P EST MOD 30 MIN: CPT | Performed by: NURSE PRACTITIONER

## 2024-01-02 PROCEDURE — 3078F DIAST BP <80 MM HG: CPT | Performed by: NURSE PRACTITIONER

## 2024-01-02 RX ORDER — ATORVASTATIN CALCIUM 80 MG/1
80 TABLET, FILM COATED ORAL EVERY EVENING
Qty: 90 TABLET | Refills: 3 | Status: CANCELLED | OUTPATIENT
Start: 2024-01-02

## 2024-01-02 ASSESSMENT — ENCOUNTER SYMPTOMS
ORTHOPNEA: 0
MYALGIAS: 0
PALPITATIONS: 0
CHILLS: 0
BRUISES/BLEEDS EASILY: 0
HEADACHES: 0
INSOMNIA: 0
ABDOMINAL PAIN: 0
FEVER: 0
SHORTNESS OF BREATH: 0
PND: 0
DIZZINESS: 0
LOSS OF CONSCIOUSNESS: 0
COUGH: 0
NAUSEA: 0

## 2024-01-02 NOTE — PROGRESS NOTES
Chief Complaint   Patient presents with    Follow-Up     Coronary artery calcification    HTN (Controlled)    Hyperlipidemia       Subjective     Art Jameel Hollis is a 63 y.o. male who presents today for annual follow-up of elevated CTCS score/hyperlipidemia, and HTN.    Salo is a 63 year old male with history of elevated CTCS (1300+ in May 2019), which led to coronary angiogram in June 2019, which showed nonobstructive CAD, LVEF 65%. He is treated with statin and Zetia. He also has hypertension, treated, and was last seen by me in October 2022.     He is here today for annual follow-up. Generally, he is doing very well and continues to exercise 5-7 days per week (walks/runs), without any problems or symptoms.    He denies any chest pain, pressure, tightness or discomfort; no symptomatic palpitations or racing of his heart; no shortness of breath, orthopnea or PND; no dizziness, lightheadedness or syncope; no LE edema. He tries to watch his dietary intake.    Past Medical History:   Diagnosis Date    Allergy     Elevated coronary artery calcium score     Treated with statin and Zetia. June 2019: Coronary angiogram with nonobstructive CAD, LVEF 65%    GERD (gastroesophageal reflux disease)     Hiatal hernia     Hyperlipidemia     Hypertension      Past Surgical History:   Procedure Laterality Date    ZZZ CARDIAC CATH  06/05/2019    non obstructive disease.    TENDON REPAIR Right 2008     Family History   Problem Relation Age of Onset    Hypertension Mother     Diabetes Father     Hypertension Sister     Hypertension Brother     Diabetes Brother     Hypertension Son      Social History     Socioeconomic History    Marital status:      Spouse name: Not on file    Number of children: Not on file    Years of education: Not on file    Highest education level: Bachelor's degree (e.g., BA, AB, BS)   Occupational History    Not on file   Tobacco Use    Smoking status: Former     Current packs/day: 0.00     Average  packs/day: 0.3 packs/day for 3.0 years (0.8 ttl pk-yrs)     Types: Cigarettes     Start date:      Quit date:      Years since quittin.0    Smokeless tobacco: Never   Vaping Use    Vaping Use: Never used   Substance and Sexual Activity    Alcohol use: Yes     Alcohol/week: 12.6 oz     Types: 21 Standard drinks or equivalent per week     Comment: 2-3 drinks/day     Drug use: No    Sexual activity: Yes     Partners: Female   Other Topics Concern    Not on file   Social History Narrative    Not on file     Social Determinants of Health     Financial Resource Strain: Low Risk  (4/10/2023)    Overall Financial Resource Strain (CARDIA)     Difficulty of Paying Living Expenses: Not hard at all   Food Insecurity: No Food Insecurity (4/10/2023)    Hunger Vital Sign     Worried About Running Out of Food in the Last Year: Never true     Ran Out of Food in the Last Year: Never true   Transportation Needs: No Transportation Needs (4/10/2023)    PRAPARE - Transportation     Lack of Transportation (Medical): No     Lack of Transportation (Non-Medical): No   Physical Activity: Sufficiently Active (4/10/2023)    Exercise Vital Sign     Days of Exercise per Week: 5 days     Minutes of Exercise per Session: 30 min   Stress: No Stress Concern Present (4/10/2023)    [employer] - Occupational Stress Questionnaire     Feeling of Stress : Only a little   Social Connections: Moderately Integrated (4/10/2023)    Social Connection and Isolation Panel [NHANES]     Frequency of Communication with Friends and Family: Twice a week     Frequency of Social Gatherings with Friends and Family: Once a week     Attends Church Services: Never     Active Member of Clubs or Organizations: Yes     Attends Club or Organization Meetings: 1 to 4 times per year     Marital Status:    Intimate Partner Violence: Not on file   Housing Stability: Low Risk  (4/10/2023)    Housing Stability Vital Sign     Unable  "to Pay for Housing in the Last Year: No     Number of Places Lived in the Last Year: 1     Unstable Housing in the Last Year: No     Allergies   Allergen Reactions    Other Food Itching     Shell fish; Pt states \"at times skin rash and/or puffy/itchy eyes\"     Outpatient Encounter Medications as of 1/2/2024   Medication Sig Dispense Refill    amlodipine-benazepril (LOTREL) 10-20 MG per capsule Take 1 Capsule by mouth every day. 90 Capsule 3    pantoprazole (PROTONIX) 40 MG Tablet Delayed Response Take 1 Tablet by mouth every morning on an empty stomach. 90 Tablet 3    atorvastatin (LIPITOR) 80 MG tablet Take 1 Tablet by mouth every evening. PLEASE CALL [phone] TO SCHEDULE A FOLLOW UP APPOINTMENT FOR FURTHER REFILLS. THANK YOU! 90 Tablet 3    hydrOXYzine HCl (ATARAX) 25 MG Tab Take 1 Tablet by mouth every 8 hours as needed for Anxiety. 90 Tablet 3    ezetimibe (ZETIA) 10 MG Tab TAKE 1 TABLET BY MOUTH  DAILY 90 Tablet 3    Cholecalciferol (VITAMIN D) 125 MCG (5000 UT) Cap Take  by mouth.      aspirin (ASA) 81 MG Chew Tab chewable tablet Take 1 Tab by mouth every day. 100 Tab 3    therapeutic multivitamin-minerals (THERAGRAN-M) Tab Take 1 Tab by mouth every day.      Probiotic Product (PROBIOTIC FORMULA PO) Take  by mouth.       No facility-administered encounter medications on file as of 1/2/2024.     Review of Systems   Constitutional:  Negative for chills and fever.   HENT:  Negative for congestion.    Respiratory:  Negative for cough and shortness of breath.    Cardiovascular:  Negative for chest pain, palpitations, orthopnea, leg swelling and PND.   Gastrointestinal:  Negative for abdominal pain and nausea.   Musculoskeletal:  Negative for myalgias.   Skin:  Negative for rash.   Neurological:  Negative for dizziness, loss of consciousness and headaches.   Endo/Heme/Allergies:  Does not bruise/bleed easily.   Psychiatric/Behavioral:  The patient does not have insomnia.               Objective     /62 (BP " "Location: Left arm, Patient Position: Sitting, BP Cuff Size: Adult)   Pulse 64   Resp 12   Ht 1.727 m (5' 8\")   Wt 91.6 kg (202 lb)   SpO2 97%   BMI 30.71 kg/m²     Physical Exam  Constitutional:       Appearance: He is well-developed.   HENT:      Head: Normocephalic.   Neck:      Vascular: No JVD.   Cardiovascular:      Rate and Rhythm: Normal rate and regular rhythm.      Heart sounds: Normal heart sounds.   Pulmonary:      Effort: Pulmonary effort is normal. No respiratory distress.      Breath sounds: Normal breath sounds. No wheezing or rales.   Abdominal:      General: Bowel sounds are normal. There is no distension.      Palpations: Abdomen is soft.      Tenderness: There is no abdominal tenderness.   Musculoskeletal:         General: Normal range of motion.      Cervical back: Normal range of motion and neck supple.   Skin:     General: Skin is warm and dry.      Findings: No rash.   Neurological:      Mental Status: He is alert and oriented to person, place, and time.       POSTPROCEDURE DIAGNOSES OF CORONARY ANGIOGRAM OF 6/5/2019:  1.  Nonobstructive coronary artery disease.    2.  Normal left ventricular systolic function with ejection fraction of 65%.    3.  Elevated left ventricular end diastolic pressure.       FINDINGS OF CTCS OF 5/2/2019:  Coronary calcification:  LMA - 0.0  LCX - 41.5  LAD - 395.0  RCA - 928.5  PDA - 0.0  Total Calcium Score: 1365     Lab Results   Component Value Date/Time    CHOLSTRLTOT 119 09/27/2023 08:39 AM    LDL 34 09/27/2023 08:39 AM    HDL 42 09/27/2023 08:39 AM    TRIGLYCERIDE 213 (H) 09/27/2023 08:39 AM        Lab Results   Component Value Date/Time    SODIUM 141 09/27/2023 08:39 AM    POTASSIUM 3.7 09/27/2023 08:39 AM    CHLORIDE 105 09/27/2023 08:39 AM    CO2 24 09/27/2023 08:39 AM    GLUCOSE 121 (H) 09/27/2023 08:39 AM    BUN 12 09/27/2023 08:39 AM    CREATININE 0.76 09/27/2023 08:39 AM    BUNCREATRAT 19 07/15/2020 05:07 AM      Lab Results   Component Value " Date/Time    ASTSGOT 22 09/27/2023 08:39 AM    ALTSGPT 22 09/27/2023 08:39 AM         Assessment & Plan     1. Coronary artery calcification        2. S/P cardiac cath        3. Essential hypertension        4. Dyslipidemia        5. Metabolic syndrome            Medical Decision Making: Today's Assessment/Status/Plan:      1. Elevated CTCS score, with nonobstructive CAD on [facility] In 2019, on Lipitor and Zetia. LDL is at goal.    2. Hypertension, treated with Lotrel 10/20mg once daily. BP is under good control.    3. Hyperlipidemia, treated with Lipitor 80mg and Zetia 10mg. Lipids are well controlled.    4. Metabolic syndrome, with HgbA1c 5.7, improved. Followed by PCP.    He is doing well and remains stable. He is exercising regularly and tolerates without any problems. Continue with healthy diet.    Same medications for now, and follow-up annually. Follow-up sooner if clinical condition changes.

## 2024-01-07 ENCOUNTER — PATIENT MESSAGE (OUTPATIENT)
Dept: MEDICAL GROUP | Facility: MEDICAL CENTER | Age: 64
End: 2024-01-07
Payer: COMMERCIAL

## 2024-01-07 ENCOUNTER — PATIENT MESSAGE (OUTPATIENT)
Dept: CARDIOLOGY | Facility: MEDICAL CENTER | Age: 64
End: 2024-01-07
Payer: COMMERCIAL

## 2024-01-08 RX ORDER — AMLODIPINE BESYLATE AND BENAZEPRIL HYDROCHLORIDE 10; 20 MG/1; MG/1
1 CAPSULE ORAL DAILY
Qty: 90 CAPSULE | Refills: 3 | Status: SHIPPED | OUTPATIENT
Start: 2024-01-08

## 2024-02-23 ENCOUNTER — PATIENT MESSAGE (OUTPATIENT)
Dept: MEDICAL GROUP | Facility: MEDICAL CENTER | Age: 64
End: 2024-02-23
Payer: COMMERCIAL

## 2024-02-23 DIAGNOSIS — E78.5 DYSLIPIDEMIA: ICD-10-CM

## 2024-02-23 RX ORDER — PANTOPRAZOLE SODIUM 40 MG/1
40 TABLET, DELAYED RELEASE ORAL
Qty: 90 TABLET | Refills: 3 | Status: SHIPPED | OUTPATIENT
Start: 2024-02-23

## 2024-02-23 RX ORDER — AMLODIPINE BESYLATE AND BENAZEPRIL HYDROCHLORIDE 10; 20 MG/1; MG/1
1 CAPSULE ORAL DAILY
Qty: 90 CAPSULE | Refills: 3 | Status: CANCELLED | OUTPATIENT
Start: 2024-02-23

## 2024-02-23 RX ORDER — ATORVASTATIN CALCIUM 80 MG/1
80 TABLET, FILM COATED ORAL EVERY EVENING
Qty: 90 TABLET | Refills: 3 | Status: SHIPPED | OUTPATIENT
Start: 2024-02-23

## 2024-02-23 RX ORDER — EZETIMIBE 10 MG/1
10 TABLET ORAL DAILY
Qty: 90 TABLET | Refills: 3 | Status: SHIPPED | OUTPATIENT
Start: 2024-02-23

## 2024-03-26 ENCOUNTER — HOSPITAL ENCOUNTER (OUTPATIENT)
Dept: LAB | Facility: MEDICAL CENTER | Age: 64
End: 2024-03-26
Attending: FAMILY MEDICINE
Payer: COMMERCIAL

## 2024-03-26 DIAGNOSIS — Z12.5 SCREENING FOR PROSTATE CANCER: ICD-10-CM

## 2024-03-26 DIAGNOSIS — E78.5 DYSLIPIDEMIA: ICD-10-CM

## 2024-03-26 DIAGNOSIS — R73.03 PREDIABETES: ICD-10-CM

## 2024-03-26 DIAGNOSIS — E55.9 VITAMIN D DEFICIENCY: ICD-10-CM

## 2024-03-26 LAB
25(OH)D3 SERPL-MCNC: 36 NG/ML (ref 30–100)
ALBUMIN SERPL BCP-MCNC: 4.5 G/DL (ref 3.2–4.9)
ALBUMIN/GLOB SERPL: 1.7 G/DL
ALP SERPL-CCNC: 76 U/L (ref 30–99)
ALT SERPL-CCNC: 22 U/L (ref 2–50)
ANION GAP SERPL CALC-SCNC: 12 MMOL/L (ref 7–16)
AST SERPL-CCNC: 19 U/L (ref 12–45)
BILIRUB SERPL-MCNC: 0.7 MG/DL (ref 0.1–1.5)
BUN SERPL-MCNC: 14 MG/DL (ref 8–22)
CALCIUM ALBUM COR SERPL-MCNC: 8.7 MG/DL (ref 8.5–10.5)
CALCIUM SERPL-MCNC: 9.1 MG/DL (ref 8.4–10.2)
CHLORIDE SERPL-SCNC: 108 MMOL/L (ref 96–112)
CHOLEST SERPL-MCNC: 134 MG/DL (ref 100–199)
CO2 SERPL-SCNC: 22 MMOL/L (ref 20–33)
CREAT SERPL-MCNC: 0.79 MG/DL (ref 0.5–1.4)
EST. AVERAGE GLUCOSE BLD GHB EST-MCNC: 120 MG/DL
FASTING STATUS PATIENT QL REPORTED: NORMAL
GFR SERPLBLD CREATININE-BSD FMLA CKD-EPI: 99 ML/MIN/1.73 M 2
GLOBULIN SER CALC-MCNC: 2.6 G/DL (ref 1.9–3.5)
GLUCOSE SERPL-MCNC: 113 MG/DL (ref 65–99)
HBA1C MFR BLD: 5.8 % (ref 4–5.6)
HDLC SERPL-MCNC: 46 MG/DL
LDLC SERPL CALC-MCNC: 39 MG/DL
POTASSIUM SERPL-SCNC: 4.1 MMOL/L (ref 3.6–5.5)
PROT SERPL-MCNC: 7.1 G/DL (ref 6–8.2)
PSA SERPL-MCNC: 2.09 NG/ML (ref 0–4)
SODIUM SERPL-SCNC: 142 MMOL/L (ref 135–145)
TRIGL SERPL-MCNC: 246 MG/DL (ref 0–149)

## 2024-03-26 PROCEDURE — 80061 LIPID PANEL: CPT

## 2024-03-26 PROCEDURE — 82306 VITAMIN D 25 HYDROXY: CPT

## 2024-03-26 PROCEDURE — 36415 COLL VENOUS BLD VENIPUNCTURE: CPT

## 2024-03-26 PROCEDURE — 84153 ASSAY OF PSA TOTAL: CPT

## 2024-03-26 PROCEDURE — 80053 COMPREHEN METABOLIC PANEL: CPT

## 2024-03-26 PROCEDURE — 83036 HEMOGLOBIN GLYCOSYLATED A1C: CPT

## 2024-04-02 ENCOUNTER — OFFICE VISIT (OUTPATIENT)
Dept: MEDICAL GROUP | Facility: MEDICAL CENTER | Age: 64
End: 2024-04-02
Payer: COMMERCIAL

## 2024-04-02 VITALS
DIASTOLIC BLOOD PRESSURE: 58 MMHG | HEIGHT: 68 IN | OXYGEN SATURATION: 96 % | BODY MASS INDEX: 30.41 KG/M2 | RESPIRATION RATE: 16 BRPM | TEMPERATURE: 98.1 F | SYSTOLIC BLOOD PRESSURE: 118 MMHG | WEIGHT: 200.62 LBS | HEART RATE: 70 BPM

## 2024-04-02 DIAGNOSIS — I25.84 CORONARY ARTERY CALCIFICATION: ICD-10-CM

## 2024-04-02 DIAGNOSIS — I25.10 CORONARY ARTERY CALCIFICATION: ICD-10-CM

## 2024-04-02 DIAGNOSIS — K80.20 CALCULUS OF GALLBLADDER WITHOUT CHOLECYSTITIS WITHOUT OBSTRUCTION: ICD-10-CM

## 2024-04-02 DIAGNOSIS — E78.5 DYSLIPIDEMIA: ICD-10-CM

## 2024-04-02 DIAGNOSIS — R73.03 PREDIABETES: ICD-10-CM

## 2024-04-02 DIAGNOSIS — E55.9 VITAMIN D DEFICIENCY: ICD-10-CM

## 2024-04-02 PROCEDURE — 3078F DIAST BP <80 MM HG: CPT | Performed by: FAMILY MEDICINE

## 2024-04-02 PROCEDURE — 99214 OFFICE O/P EST MOD 30 MIN: CPT | Performed by: FAMILY MEDICINE

## 2024-04-02 PROCEDURE — 3074F SYST BP LT 130 MM HG: CPT | Performed by: FAMILY MEDICINE

## 2024-04-02 RX ORDER — CALCIUM CARBONATE 500 MG/1
600 TABLET, CHEWABLE ORAL DAILY
COMMUNITY

## 2024-04-02 ASSESSMENT — PATIENT HEALTH QUESTIONNAIRE - PHQ9: CLINICAL INTERPRETATION OF PHQ2 SCORE: 0

## 2024-04-02 ASSESSMENT — ENCOUNTER SYMPTOMS
FEVER: 0
PALPITATIONS: 0
CHILLS: 0

## 2024-04-02 NOTE — PROGRESS NOTES
Verbal consent was acquired by the patient to use [employer] ambient listening note generation during this visit.      Art was seen today for follow-up and lab results.    Diagnoses and all orders for this visit:    Dyslipidemia  -     Comp Metabolic Panel; Future  -     Lipid Profile; Future    Prediabetes  -     HEMOGLOBIN A1C; Future    Vitamin D deficiency  -     VITAMIN D,25 HYDROXY (DEFICIENCY); Future    Coronary artery calcification  -     CT-CARDIAC SCORING; Future    Calculus of gallbladder without cholecystitis without obstruction                  Assessment & Plan  1. Dyslipidemia.  The patient's chronic condition is stable, with LDL cholesterol levels at the desired goal. However, the triglyceride levels are mildly elevated. I have advised the patient to reduce carbohydrate and fatty food intake to aid in reducing these levels. The patient will continue with Lipitor 80 mg daily and Zetia 10 mg daily. We also discussed triglyceride medication, fenofibrate. Given the patient's current high-dose statin, we have decided to avoid this medication due to its potential to induce liver damage. The patient's labs will be reassessed in 6 months.    2. Prediabetes.  The patient's chronic condition, unstable, with A1c levels being mildly elevated from previous numbers. The patient will focus on diet and exercise for the next 6 months. His labs will be repeated in 6 months.    3. Vitamin D deficiency.  The patient's condition is stable, continue vitamin D supplementation    4. Coronary artery calcification.  Chronic condition, unstable  The patient had a CT cardiac scoring done in 05/2019, which showed a calcium score of 400 or greater. Since then, the patient has been on cholesterol medication and aspirin. The patient's CT cardiac score will be rechecked to further evaluate the calcification.    5. Gallbladder stones.  The patient's chronic condition is stable. He experiences intermittent pain, sometimes when  consuming heavy meals. Currently, the patient is not experiencing any pain. We will closely monitor the patient's symptoms. We discussed dietary modifications, including reducing his intake of fatty foods and reducing his food intake to prevent exacerbations. If the patient experiences recurrent pain and exacerbations, we will refer him to a general surgeon for evaluation of gallstone removal.    Follow-up  The patient is scheduled for a follow-up visit in 6 months.          Chief complaint::Diagnoses of Dyslipidemia, Prediabetes, Vitamin D deficiency, Coronary artery calcification, and Calculus of gallbladder without cholecystitis without obstruction were pertinent to this visit.      History of Present Illness  The patient is a 63-year-old male who is here for blood test follow-up. Blood test was done on 03/26/2024.     The patient denies experiencing any symptoms of prostate enlargement. He maintains a high water intake, consuming approximately one gallon of water daily, and urinates before bedtime. However, he wakes up every two hours during the night to urinate and remains symptom-free until 6 a.m.    The patient is currently on a regimen of vitamin D3 (5000) and calcium + vitamin D    The patient maintains an active lifestyle, engaging in 30 minutes of exercise five days a week. He acknowledges a slight increase in consumption of pasta and white bread over the past few months. He is not currently on any medication for hypertriglyceridemia.    The patient has a high calcification score in his arteries, as confirmed by a CT cardiac scoring conducted in 2019.     The patient underwent an endoscopy and colonoscopy a few years ago. He was diagnosed with a hiatal hernia, a condition he has been dealing with for an extended period. His medication was switched from omeprazole to pantoprazole, which has been effective in managing his symptoms. He reports feeling light and hungry in the morning for the first time in a  "long time, a symptom he frequently experiences. However, he has been experiencing bloating for the past 1 to 1.5 months, particularly after consuming cheese or fried foods.    The patient also has gallstones, which cause him severe indigestion and pain. He was symptom-free until approximately 2 months ago when he began experiencing symptoms again after consuming large quantities of food. However, after 2 days, he feels better. He consumed a burger and fries on Saturday, which he had not consumed for a significant period. He has undergone a couple of ultrasounds. He consumes pizza and chicken wings at a [employer] once a month.      Review of Systems   Constitutional:  Negative for chills and fever.   Cardiovascular:  Negative for chest pain, palpitations and leg swelling.          Medications and Allergies:     Current Outpatient Medications   Medication Sig Dispense Refill    calcium carbonate (TUMS) 500 MG Chew Tab Chew 600 mg every day.      atorvastatin (LIPITOR) 80 MG tablet Take 1 Tablet by mouth every evening. 90 Tablet 3    ezetimibe (ZETIA) 10 MG Tab Take 1 Tablet by mouth every day. 90 Tablet 3    pantoprazole (PROTONIX) 40 MG Tablet Delayed Response Take 1 Tablet by mouth every morning on an empty stomach. 90 Tablet 3    amlodipine-benazepril (LOTREL) 10-20 MG per capsule Take 1 Capsule by mouth every day. 90 Capsule 3    aspirin (ASA) 81 MG Chew Tab chewable tablet Take 1 Tab by mouth every day. 100 Tab 3    therapeutic multivitamin-minerals (THERAGRAN-M) Tab Take 1 Tab by mouth every day.      Probiotic Product (PROBIOTIC FORMULA PO) Take  by mouth.      Cholecalciferol (VITAMIN D) 125 MCG (5000 UT) Cap Take  by mouth.       No current facility-administered medications for this visit.       /58   Pulse 70   Temp 36.7 °C (98.1 °F)   Resp 16   Ht 1.727 m (5' 8\")   Wt 91 kg (200 lb 9.9 oz)   SpO2 96% , Body mass index is 30.5 kg/m².      Physical Exam  Constitutional:       Appearance: Normal " appearance. He is well-developed and well-groomed.   HENT:      Head: Normocephalic and atraumatic.      Right Ear: External ear normal.      Left Ear: External ear normal.   Eyes:      General:         Right eye: No discharge.         Left eye: No discharge.      Conjunctiva/sclera: Conjunctivae normal.   Cardiovascular:      Rate and Rhythm: Normal rate.   Pulmonary:      Effort: Pulmonary effort is normal. No respiratory distress.   Musculoskeletal:      Cervical back: Neck supple.   Skin:     Findings: No rash.   Neurological:      Mental Status: He is alert.   Psychiatric:         Mood and Affect: Mood and affect normal.         Behavior: Behavior normal.              Results  Laboratory Studies from 3/26/2024 discussed with patient  PSA level is normal.. Vitamin D levels normal. Triglycerides are slightly elevated. LDL and HDL levels are good. A1c is slightly elevated. Calcium level is 8.7.          Please note that this dictation was created using voice recognition software. I have made every reasonable attempt to correct obvious errors, but I expect that there are errors of grammar and possibly content that I did not discover before finalizing the note.

## 2024-04-11 ENCOUNTER — PATIENT MESSAGE (OUTPATIENT)
Dept: MEDICAL GROUP | Facility: MEDICAL CENTER | Age: 64
End: 2024-04-11
Payer: COMMERCIAL

## 2024-04-11 DIAGNOSIS — R10.9 STOMACH DISCOMFORT: ICD-10-CM

## 2024-04-28 ENCOUNTER — PATIENT MESSAGE (OUTPATIENT)
Dept: MEDICAL GROUP | Facility: MEDICAL CENTER | Age: 64
End: 2024-04-28
Payer: COMMERCIAL

## 2024-04-29 RX ORDER — PANTOPRAZOLE SODIUM 40 MG/1
40 TABLET, DELAYED RELEASE ORAL 2 TIMES DAILY
Qty: 180 TABLET | Refills: 3 | Status: SHIPPED | OUTPATIENT
Start: 2024-04-29

## 2024-04-29 RX ORDER — PANTOPRAZOLE SODIUM 40 MG/1
40 TABLET, DELAYED RELEASE ORAL 2 TIMES DAILY
Qty: 60 TABLET | Refills: 0 | Status: SHIPPED | OUTPATIENT
Start: 2024-04-29

## 2024-05-26 DIAGNOSIS — K20.90 ESOPHAGITIS ON BIOPSY: ICD-10-CM

## 2024-05-27 RX ORDER — PANTOPRAZOLE SODIUM 40 MG/1
40 TABLET, DELAYED RELEASE ORAL 2 TIMES DAILY
Qty: 180 TABLET | Refills: 3 | Status: SHIPPED | OUTPATIENT
Start: 2024-05-27

## 2024-06-26 ENCOUNTER — PATIENT MESSAGE (OUTPATIENT)
Dept: MEDICAL GROUP | Facility: MEDICAL CENTER | Age: 64
End: 2024-06-26
Payer: COMMERCIAL

## 2024-07-09 ENCOUNTER — OFFICE VISIT (OUTPATIENT)
Dept: MEDICAL GROUP | Facility: MEDICAL CENTER | Age: 64
End: 2024-07-09
Payer: COMMERCIAL

## 2024-07-09 ENCOUNTER — HOSPITAL ENCOUNTER (OUTPATIENT)
Dept: LAB | Facility: MEDICAL CENTER | Age: 64
End: 2024-07-09
Attending: FAMILY MEDICINE
Payer: COMMERCIAL

## 2024-07-09 VITALS
DIASTOLIC BLOOD PRESSURE: 78 MMHG | WEIGHT: 201 LBS | SYSTOLIC BLOOD PRESSURE: 136 MMHG | HEART RATE: 71 BPM | TEMPERATURE: 98.2 F | OXYGEN SATURATION: 96 % | BODY MASS INDEX: 30.46 KG/M2 | HEIGHT: 68 IN

## 2024-07-09 DIAGNOSIS — Z13.6 SCREENING FOR AAA (ABDOMINAL AORTIC ANEURYSM): ICD-10-CM

## 2024-07-09 DIAGNOSIS — K64.9 HEMORRHOIDS, UNSPECIFIED HEMORRHOID TYPE: ICD-10-CM

## 2024-07-09 DIAGNOSIS — R19.7 DIARRHEA, UNSPECIFIED TYPE: ICD-10-CM

## 2024-07-09 DIAGNOSIS — M25.422 SWELLING OF LEFT ELBOW: ICD-10-CM

## 2024-07-09 PROCEDURE — 3078F DIAST BP <80 MM HG: CPT | Performed by: FAMILY MEDICINE

## 2024-07-09 PROCEDURE — 99214 OFFICE O/P EST MOD 30 MIN: CPT | Performed by: FAMILY MEDICINE

## 2024-07-09 PROCEDURE — 3075F SYST BP GE 130 - 139MM HG: CPT | Performed by: FAMILY MEDICINE

## 2024-07-09 RX ORDER — METHYLPREDNISOLONE 4 MG/1
TABLET ORAL
Qty: 21 TABLET | Refills: 0 | Status: SHIPPED | OUTPATIENT
Start: 2024-07-09

## 2024-07-09 ASSESSMENT — ENCOUNTER SYMPTOMS
CHILLS: 0
ROS GI COMMENTS: ANAL ITCHING
FEVER: 0

## 2024-07-10 ENCOUNTER — HOSPITAL ENCOUNTER (OUTPATIENT)
Facility: MEDICAL CENTER | Age: 64
End: 2024-07-10
Attending: FAMILY MEDICINE
Payer: COMMERCIAL

## 2024-07-10 DIAGNOSIS — R19.7 DIARRHEA, UNSPECIFIED TYPE: ICD-10-CM

## 2024-07-10 PROCEDURE — 87899 AGENT NOS ASSAY W/OPTIC: CPT

## 2024-07-10 PROCEDURE — 87209 SMEAR COMPLEX STAIN: CPT

## 2024-07-10 PROCEDURE — 87177 OVA AND PARASITES SMEARS: CPT

## 2024-07-10 PROCEDURE — 87046 STOOL CULTR AEROBIC BACT EA: CPT

## 2024-07-10 PROCEDURE — 87045 FECES CULTURE AEROBIC BACT: CPT

## 2024-07-10 PROCEDURE — 83993 ASSAY FOR CALPROTECTIN FECAL: CPT

## 2024-07-11 LAB
CALPROTECTIN STL-MCNT: 216 UG/G
E COLI SXT1+2 STL IA: NORMAL
SIGNIFICANT IND 70042: NORMAL
SITE SITE: NORMAL
SOURCE SOURCE: NORMAL

## 2024-07-13 LAB
BACTERIA STL CULT: NORMAL
E COLI SXT1+2 STL IA: NORMAL
OVA AND PARASITE, FECAL INTERPRETATION Q0595: NEGATIVE
SIGNIFICANT IND 70042: NORMAL
SITE SITE: NORMAL
SOURCE SOURCE: NORMAL

## 2024-07-17 ENCOUNTER — PATIENT MESSAGE (OUTPATIENT)
Dept: MEDICAL GROUP | Facility: MEDICAL CENTER | Age: 64
End: 2024-07-17
Payer: COMMERCIAL

## 2024-07-17 DIAGNOSIS — M25.422 ELBOW SWELLING, LEFT: ICD-10-CM

## 2024-08-09 ENCOUNTER — HOSPITAL ENCOUNTER (OUTPATIENT)
Facility: MEDICAL CENTER | Age: 64
End: 2024-08-09
Attending: NURSE PRACTITIONER
Payer: COMMERCIAL

## 2024-08-09 LAB — LACTOFERRIN STL QL IA: POSITIVE

## 2024-08-09 PROCEDURE — 83993 ASSAY FOR CALPROTECTIN FECAL: CPT

## 2024-08-09 PROCEDURE — 83630 LACTOFERRIN FECAL (QUAL): CPT

## 2024-08-13 LAB — CALPROTECTIN STL-MCNT: 57 UG/G

## 2024-08-20 ENCOUNTER — HOSPITAL ENCOUNTER (OUTPATIENT)
Dept: RADIOLOGY | Facility: MEDICAL CENTER | Age: 64
End: 2024-08-20
Attending: FAMILY MEDICINE
Payer: COMMERCIAL

## 2024-08-20 DIAGNOSIS — Z13.6 SCREENING FOR AAA (ABDOMINAL AORTIC ANEURYSM): ICD-10-CM

## 2024-08-20 PROCEDURE — 76706 US ABDL AORTA SCREEN AAA: CPT

## 2024-08-28 PROBLEM — M71.022: Status: ACTIVE | Noted: 2024-08-28

## 2024-09-09 ENCOUNTER — APPOINTMENT (OUTPATIENT)
Dept: RADIOLOGY | Facility: MEDICAL CENTER | Age: 64
End: 2024-09-09
Attending: FAMILY MEDICINE
Payer: COMMERCIAL

## 2024-09-18 ENCOUNTER — HOSPITAL ENCOUNTER (OUTPATIENT)
Dept: RADIOLOGY | Facility: MEDICAL CENTER | Age: 64
End: 2024-09-18
Attending: FAMILY MEDICINE
Payer: COMMERCIAL

## 2024-09-18 DIAGNOSIS — I25.10 CORONARY ARTERY CALCIFICATION: ICD-10-CM

## 2024-09-18 DIAGNOSIS — I25.84 CORONARY ARTERY CALCIFICATION: ICD-10-CM

## 2024-09-18 PROCEDURE — 4410556 CT-CARDIAC SCORING (SELF PAY ONLY)

## 2024-09-24 ENCOUNTER — HOSPITAL ENCOUNTER (OUTPATIENT)
Dept: LAB | Facility: MEDICAL CENTER | Age: 64
End: 2024-09-24
Attending: FAMILY MEDICINE
Payer: COMMERCIAL

## 2024-09-24 DIAGNOSIS — E78.5 DYSLIPIDEMIA: ICD-10-CM

## 2024-09-24 DIAGNOSIS — E55.9 VITAMIN D DEFICIENCY: ICD-10-CM

## 2024-09-24 DIAGNOSIS — R73.03 PREDIABETES: ICD-10-CM

## 2024-09-24 LAB
25(OH)D3 SERPL-MCNC: 31 NG/ML (ref 30–100)
ALBUMIN SERPL BCP-MCNC: 4.5 G/DL (ref 3.2–4.9)
ALBUMIN/GLOB SERPL: 1.9 G/DL
ALP SERPL-CCNC: 94 U/L (ref 30–99)
ALT SERPL-CCNC: 25 U/L (ref 2–50)
ANION GAP SERPL CALC-SCNC: 10 MMOL/L (ref 7–16)
AST SERPL-CCNC: 23 U/L (ref 12–45)
BILIRUB SERPL-MCNC: 0.8 MG/DL (ref 0.1–1.5)
BUN SERPL-MCNC: 13 MG/DL (ref 8–22)
CALCIUM ALBUM COR SERPL-MCNC: 8.8 MG/DL (ref 8.5–10.5)
CALCIUM SERPL-MCNC: 9.2 MG/DL (ref 8.4–10.2)
CHLORIDE SERPL-SCNC: 106 MMOL/L (ref 96–112)
CHOLEST SERPL-MCNC: 102 MG/DL (ref 100–199)
CO2 SERPL-SCNC: 23 MMOL/L (ref 20–33)
CREAT SERPL-MCNC: 0.73 MG/DL (ref 0.5–1.4)
EST. AVERAGE GLUCOSE BLD GHB EST-MCNC: 105 MG/DL
FASTING STATUS PATIENT QL REPORTED: NORMAL
GFR SERPLBLD CREATININE-BSD FMLA CKD-EPI: 101 ML/MIN/1.73 M 2
GLOBULIN SER CALC-MCNC: 2.4 G/DL (ref 1.9–3.5)
GLUCOSE SERPL-MCNC: 114 MG/DL (ref 65–99)
HBA1C MFR BLD: 5.3 % (ref 4–5.6)
HDLC SERPL-MCNC: 39 MG/DL
LDLC SERPL CALC-MCNC: 35 MG/DL
POTASSIUM SERPL-SCNC: 4 MMOL/L (ref 3.6–5.5)
PROT SERPL-MCNC: 6.9 G/DL (ref 6–8.2)
SODIUM SERPL-SCNC: 139 MMOL/L (ref 135–145)
TRIGL SERPL-MCNC: 138 MG/DL (ref 0–149)

## 2024-09-24 PROCEDURE — 83036 HEMOGLOBIN GLYCOSYLATED A1C: CPT

## 2024-09-24 PROCEDURE — 80061 LIPID PANEL: CPT

## 2024-09-24 PROCEDURE — 80053 COMPREHEN METABOLIC PANEL: CPT

## 2024-09-24 PROCEDURE — 36415 COLL VENOUS BLD VENIPUNCTURE: CPT

## 2024-09-24 PROCEDURE — 82306 VITAMIN D 25 HYDROXY: CPT

## 2024-10-01 ENCOUNTER — HOSPITAL ENCOUNTER (OUTPATIENT)
Facility: MEDICAL CENTER | Age: 64
End: 2024-10-01
Attending: NURSE PRACTITIONER
Payer: COMMERCIAL

## 2024-10-01 ENCOUNTER — APPOINTMENT (OUTPATIENT)
Dept: MEDICAL GROUP | Facility: MEDICAL CENTER | Age: 64
End: 2024-10-01
Payer: COMMERCIAL

## 2024-10-01 VITALS
HEIGHT: 68 IN | TEMPERATURE: 97.2 F | WEIGHT: 191.2 LBS | DIASTOLIC BLOOD PRESSURE: 52 MMHG | BODY MASS INDEX: 28.98 KG/M2 | SYSTOLIC BLOOD PRESSURE: 104 MMHG | OXYGEN SATURATION: 96 % | HEART RATE: 71 BPM

## 2024-10-01 DIAGNOSIS — R73.03 PREDIABETES: ICD-10-CM

## 2024-10-01 DIAGNOSIS — Z12.5 SCREENING FOR PROSTATE CANCER: ICD-10-CM

## 2024-10-01 DIAGNOSIS — E55.9 VITAMIN D DEFICIENCY: ICD-10-CM

## 2024-10-01 DIAGNOSIS — R91.1 PULMONARY NODULE: ICD-10-CM

## 2024-10-01 DIAGNOSIS — I25.10 CORONARY ARTERY CALCIFICATION: ICD-10-CM

## 2024-10-01 DIAGNOSIS — Z23 NEED FOR VACCINATION: ICD-10-CM

## 2024-10-01 LAB — H PYLORI AG STL QL IA: NOT DETECTED

## 2024-10-01 PROCEDURE — 3074F SYST BP LT 130 MM HG: CPT | Performed by: FAMILY MEDICINE

## 2024-10-01 PROCEDURE — 87338 HPYLORI STOOL AG IA: CPT

## 2024-10-01 PROCEDURE — 87077 CULTURE AEROBIC IDENTIFY: CPT

## 2024-10-01 PROCEDURE — 90677 PCV20 VACCINE IM: CPT | Performed by: FAMILY MEDICINE

## 2024-10-01 PROCEDURE — 99214 OFFICE O/P EST MOD 30 MIN: CPT | Mod: 25 | Performed by: FAMILY MEDICINE

## 2024-10-01 PROCEDURE — 87046 STOOL CULTR AEROBIC BACT EA: CPT

## 2024-10-01 PROCEDURE — 87045 FECES CULTURE AEROBIC BACT: CPT

## 2024-10-01 PROCEDURE — 3078F DIAST BP <80 MM HG: CPT | Performed by: FAMILY MEDICINE

## 2024-10-01 PROCEDURE — 90471 IMMUNIZATION ADMIN: CPT | Performed by: FAMILY MEDICINE

## 2024-10-01 ASSESSMENT — ENCOUNTER SYMPTOMS
FEVER: 0
CHILLS: 0
PALPITATIONS: 0

## 2024-10-04 LAB
BACTERIA STL CULT: NORMAL
SIGNIFICANT IND 70042: NORMAL
SITE SITE: NORMAL
SOURCE SOURCE: NORMAL

## 2024-10-09 ENCOUNTER — OFFICE VISIT (OUTPATIENT)
Dept: CARDIOLOGY | Facility: PHYSICIAN GROUP | Age: 64
End: 2024-10-09
Payer: COMMERCIAL

## 2024-10-09 VITALS
SYSTOLIC BLOOD PRESSURE: 124 MMHG | OXYGEN SATURATION: 96 % | BODY MASS INDEX: 29.07 KG/M2 | RESPIRATION RATE: 12 BRPM | HEART RATE: 68 BPM | HEIGHT: 68 IN | DIASTOLIC BLOOD PRESSURE: 54 MMHG | WEIGHT: 191.8 LBS

## 2024-10-09 DIAGNOSIS — Z98.890 S/P CARDIAC CATH: ICD-10-CM

## 2024-10-09 DIAGNOSIS — E78.5 DYSLIPIDEMIA: ICD-10-CM

## 2024-10-09 DIAGNOSIS — I10 ESSENTIAL HYPERTENSION: ICD-10-CM

## 2024-10-09 DIAGNOSIS — I25.10 CORONARY ARTERY CALCIFICATION: ICD-10-CM

## 2024-10-09 PROCEDURE — 3078F DIAST BP <80 MM HG: CPT | Performed by: NURSE PRACTITIONER

## 2024-10-09 PROCEDURE — 99214 OFFICE O/P EST MOD 30 MIN: CPT | Performed by: NURSE PRACTITIONER

## 2024-10-09 PROCEDURE — 3074F SYST BP LT 130 MM HG: CPT | Performed by: NURSE PRACTITIONER

## 2024-10-09 ASSESSMENT — ENCOUNTER SYMPTOMS
DIZZINESS: 0
CHILLS: 0
FEVER: 0
BRUISES/BLEEDS EASILY: 0
SHORTNESS OF BREATH: 0
INSOMNIA: 0
PALPITATIONS: 0
MYALGIAS: 0
NAUSEA: 0
ABDOMINAL PAIN: 0
HEADACHES: 0
COUGH: 0
LOSS OF CONSCIOUSNESS: 0
ORTHOPNEA: 0
PND: 0

## 2024-10-10 ENCOUNTER — HOSPITAL ENCOUNTER (OUTPATIENT)
Dept: RADIOLOGY | Facility: MEDICAL CENTER | Age: 64
End: 2024-10-10
Attending: FAMILY MEDICINE
Payer: COMMERCIAL

## 2024-10-10 DIAGNOSIS — R91.1 PULMONARY NODULE: ICD-10-CM

## 2024-10-11 ENCOUNTER — PATIENT MESSAGE (OUTPATIENT)
Dept: MEDICAL GROUP | Facility: MEDICAL CENTER | Age: 64
End: 2024-10-11
Payer: COMMERCIAL

## 2024-10-15 ENCOUNTER — OFFICE VISIT (OUTPATIENT)
Dept: MEDICAL GROUP | Facility: MEDICAL CENTER | Age: 64
End: 2024-10-15
Payer: COMMERCIAL

## 2024-10-15 VITALS
HEART RATE: 65 BPM | WEIGHT: 192.46 LBS | BODY MASS INDEX: 29.17 KG/M2 | DIASTOLIC BLOOD PRESSURE: 54 MMHG | TEMPERATURE: 97.8 F | SYSTOLIC BLOOD PRESSURE: 112 MMHG | OXYGEN SATURATION: 95 % | HEIGHT: 68 IN

## 2024-10-15 DIAGNOSIS — I10 ESSENTIAL HYPERTENSION: ICD-10-CM

## 2024-10-15 DIAGNOSIS — F40.240 CLAUSTROPHOBIA: ICD-10-CM

## 2024-10-15 DIAGNOSIS — G47.09 OTHER INSOMNIA: ICD-10-CM

## 2024-10-15 PROCEDURE — 3074F SYST BP LT 130 MM HG: CPT | Performed by: FAMILY MEDICINE

## 2024-10-15 PROCEDURE — 99214 OFFICE O/P EST MOD 30 MIN: CPT | Performed by: FAMILY MEDICINE

## 2024-10-15 PROCEDURE — 3078F DIAST BP <80 MM HG: CPT | Performed by: FAMILY MEDICINE

## 2024-10-15 RX ORDER — ALPRAZOLAM 0.5 MG
.5-1 TABLET ORAL
Qty: 2 TABLET | Refills: 0 | Status: SHIPPED | OUTPATIENT
Start: 2024-10-15 | End: 2024-10-16

## 2024-10-15 RX ORDER — TRAZODONE HYDROCHLORIDE 50 MG/1
50 TABLET, FILM COATED ORAL NIGHTLY
Qty: 30 TABLET | Refills: 0 | Status: SHIPPED | OUTPATIENT
Start: 2024-10-15

## 2024-10-15 ASSESSMENT — ENCOUNTER SYMPTOMS
CHILLS: 0
INSOMNIA: 1
FEVER: 0

## 2024-11-01 ENCOUNTER — APPOINTMENT (OUTPATIENT)
Dept: RADIOLOGY | Facility: MEDICAL CENTER | Age: 64
End: 2024-11-01
Attending: FAMILY MEDICINE
Payer: COMMERCIAL

## 2024-11-01 PROCEDURE — 700117 HCHG RX CONTRAST REV CODE 255: Performed by: FAMILY MEDICINE

## 2024-11-01 PROCEDURE — 71260 CT THORAX DX C+: CPT

## 2024-11-01 RX ADMIN — IOHEXOL 75 ML: 350 INJECTION, SOLUTION INTRAVENOUS at 10:15

## 2024-11-05 DIAGNOSIS — K76.9 LIVER LESION: ICD-10-CM

## 2024-11-10 DIAGNOSIS — G47.09 OTHER INSOMNIA: ICD-10-CM

## 2024-11-11 RX ORDER — TRAZODONE HYDROCHLORIDE 50 MG/1
50 TABLET, FILM COATED ORAL EVERY EVENING
Qty: 90 TABLET | Refills: 3 | Status: SHIPPED | OUTPATIENT
Start: 2024-11-11

## 2024-11-11 NOTE — TELEPHONE ENCOUNTER
Received request via: Pharmacy    Was the patient seen in the last year in this department? Yes    Does the patient have an active prescription (recently filled or refills available) for medication(s) requested? No    [facility]    Does the patient have [facility] and need 100-day supply? (This applies to ALL medications) Patient does not have SCP

## 2024-11-16 NOTE — TELEPHONE ENCOUNTER
Received request via: Pharmacy    Was the patient seen in the last year in this department? Yes    Does the patient have an active prescription (recently filled or refills available) for medication(s) requested? No    [facility]    [facility] the [facility] and need 100-day supply? (This applies to ALL medications) Patient does not have SCP

## 2024-11-18 RX ORDER — AMLODIPINE AND BENAZEPRIL HYDROCHLORIDE 10; 20 MG/1; MG/1
1 CAPSULE ORAL
Qty: 90 CAPSULE | Refills: 3 | Status: SHIPPED | OUTPATIENT
Start: 2024-11-18

## 2024-11-20 ENCOUNTER — OFFICE VISIT (OUTPATIENT)
Dept: MEDICAL GROUP | Facility: MEDICAL CENTER | Age: 64
End: 2024-11-20
Payer: COMMERCIAL

## 2024-11-20 VITALS
SYSTOLIC BLOOD PRESSURE: 116 MMHG | HEIGHT: 68 IN | TEMPERATURE: 98.3 F | DIASTOLIC BLOOD PRESSURE: 60 MMHG | BODY MASS INDEX: 29.44 KG/M2 | OXYGEN SATURATION: 96 % | WEIGHT: 194.22 LBS | HEART RATE: 72 BPM

## 2024-11-20 DIAGNOSIS — K76.9 LESION OF LIVER: ICD-10-CM

## 2024-11-20 DIAGNOSIS — F40.248 SITUATIONAL PHOBIA: ICD-10-CM

## 2024-11-20 DIAGNOSIS — I10 ESSENTIAL HYPERTENSION: ICD-10-CM

## 2024-11-20 DIAGNOSIS — R91.1 PULMONARY NODULE: ICD-10-CM

## 2024-11-20 PROCEDURE — 3078F DIAST BP <80 MM HG: CPT | Performed by: NURSE PRACTITIONER

## 2024-11-20 PROCEDURE — 99214 OFFICE O/P EST MOD 30 MIN: CPT | Performed by: NURSE PRACTITIONER

## 2024-11-20 PROCEDURE — 3074F SYST BP LT 130 MM HG: CPT | Performed by: NURSE PRACTITIONER

## 2024-11-20 RX ORDER — ALPRAZOLAM 0.5 MG
.5-1 TABLET ORAL ONCE
Qty: 2 TABLET | Refills: 0 | Status: SHIPPED | OUTPATIENT
Start: 2024-11-20 | End: 2024-11-20

## 2024-11-20 ASSESSMENT — ENCOUNTER SYMPTOMS
FEVER: 0
PALPITATIONS: 0
CHILLS: 0

## 2024-11-20 NOTE — PROGRESS NOTES
Patient agreed to use of ALEJANDRINA: yes  No chief complaint on file.      HISTORY OF PRESENT ILLNESS: Patient is a pleasant 64 y.o. male, established patient who presents today to discuss medical problems as listed below:    Assessment/Plan:    Diagnoses and all orders for this visit:    Situational phobia  -     ALPRAZolam (XANAX) 0.5 MG Tab; Take 1-2 Tablets by mouth one time for 1 dose.    Pulmonary nodule    Essential hypertension    Lesion of liver              Assessment & Plan  1. Situational phobia.  A prescription for Xanax 0.5 mg, to be taken as needed for upcoming MRI, was provided .    2.  left lung nodule.  Resolved.  The previous CT scan showed a resolved left lung nodule, which is benign. No further action is required for this condition.    3.  Lesion of liver.  An MRI of the abdomen with and without contrast is recommended to further evaluate the 1.5 cm low-density liver lesion shown on recent CT. The MRI will help determine if further follow-up is needed.    4.  Essential hypertension  Chronic and stable condition.  This is well-controlled on Lotrel.  Patient is following with cardiology.  He was also taking aspirin 81 mg daily.  No concerns today.      History of Present Illness  The patient presents for evaluation of situational phobia.    She is seeking a refill of her Xanax prescription, which she has found effective in managing her symptoms. She has been taking two pills daily. She underwent a CT scan a few weeks ago and an MRI on 12/03/2024. The CT scan revealed a lesion on her lung, but the MRI did not confirm this finding. However, the MRI did reveal a small lesion on her liver.      Allergies, past medical history, past surgical history, family history, social history reviewed and updated.    Review of Systems   Constitutional:  Negative for chills, fever and malaise/fatigue.   Cardiovascular:  Negative for chest pain, palpitations and leg swelling.        Exam:    /60 (BP Location: Left  "arm, Patient Position: Sitting, BP Cuff Size: Adult)   Pulse 72   Temp 36.8 °C (98.3 °F) (Temporal)   Ht 1.727 m (5' 8\")   Wt 88.1 kg (194 lb 3.6 oz)   SpO2 96%   BMI 29.53 kg/m²  Body mass index is 29.53 kg/m².    Physical Exam  Constitutional:       General: He is not in acute distress.     Appearance: He is not ill-appearing.   HENT:      Head: Normocephalic and atraumatic.      Nose: Nose normal.   Eyes:      General:         Right eye: No discharge.         Left eye: No discharge.   Cardiovascular:      Rate and Rhythm: Normal rate.      Pulses: Normal pulses.      Heart sounds: Normal heart sounds. No murmur heard.  Pulmonary:      Effort: Pulmonary effort is normal. No respiratory distress.      Breath sounds: Normal breath sounds. No stridor. No wheezing or rales.   Skin:     Findings: No rash.   Neurological:      General: No focal deficit present.      Mental Status: He is alert. Mental status is at baseline.   Psychiatric:         Mood and Affect: Mood normal.         Behavior: Behavior normal.          Results  Imaging  Resolved left lung nodule, no suspicious nodule on the current exam. Indeterminate 1.5 cm low density liver lesion.       Discussed with patient possible alternative diagnoses, patient is to take all medications as prescribed.      If symptoms persist FU w/PCP, if symptoms worsen go to emergency room.      If experiencing any side effects from prescribed medications report to the office immediately or go to emergency room.     Reviewed indication, dosage, usage and potential adverse effects of prescribed medications.      Reviewed risks and benefits of treatment plan. Patient verbalizes understanding of all instruction and verbally agrees to plan.     Discussed plan with the patient, and patient agrees to the above.      I personally reviewed prior external notes and test results pertinent to today's visit.      No follow-ups on file. PRN  "

## 2024-12-03 ENCOUNTER — HOSPITAL ENCOUNTER (OUTPATIENT)
Dept: RADIOLOGY | Facility: MEDICAL CENTER | Age: 64
End: 2024-12-03
Attending: FAMILY MEDICINE
Payer: COMMERCIAL

## 2024-12-03 DIAGNOSIS — K76.9 LIVER LESION: ICD-10-CM

## 2024-12-03 PROCEDURE — 74183 MRI ABD W/O CNTR FLWD CNTR: CPT

## 2024-12-03 PROCEDURE — 700117 HCHG RX CONTRAST REV CODE 255: Mod: JZ | Performed by: FAMILY MEDICINE

## 2024-12-03 PROCEDURE — A9581 GADOXETATE DISODIUM INJ: HCPCS | Mod: JZ | Performed by: FAMILY MEDICINE

## 2024-12-03 RX ADMIN — GADOXETATE DISODIUM 10 ML: 181.43 INJECTION, SOLUTION INTRAVENOUS at 10:46

## 2025-01-02 DIAGNOSIS — E78.5 DYSLIPIDEMIA: ICD-10-CM

## 2025-01-02 RX ORDER — ATORVASTATIN CALCIUM 80 MG/1
80 TABLET, FILM COATED ORAL EVERY EVENING
Qty: 90 TABLET | Refills: 3 | Status: SHIPPED | OUTPATIENT
Start: 2025-01-02

## 2025-01-02 NOTE — TELEPHONE ENCOUNTER
Received request via: Pharmacy    Was the patient seen in the last year in this department? Yes    Does the patient have an active prescription (recently filled or refills available) for medication(s) requested? No    [facility] Name: [facility]    Does the patient have correction Plus and need 100-day supply? (This applies to ALL medications) Patient does not have SCP

## 2025-01-04 DIAGNOSIS — E78.5 DYSLIPIDEMIA: ICD-10-CM

## 2025-01-06 RX ORDER — EZETIMIBE 10 MG/1
10 TABLET ORAL DAILY
Qty: 90 TABLET | Refills: 0 | Status: SHIPPED | OUTPATIENT
Start: 2025-01-06

## 2025-03-05 ENCOUNTER — APPOINTMENT (OUTPATIENT)
Dept: URBAN - METROPOLITAN AREA CLINIC 6 | Facility: CLINIC | Age: 65
Setting detail: DERMATOLOGY
End: 2025-03-05

## 2025-03-05 DIAGNOSIS — L82.1 OTHER SEBORRHEIC KERATOSIS: ICD-10-CM

## 2025-03-05 DIAGNOSIS — Z71.89 OTHER SPECIFIED COUNSELING: ICD-10-CM

## 2025-03-05 DIAGNOSIS — L81.4 OTHER MELANIN HYPERPIGMENTATION: ICD-10-CM

## 2025-03-05 DIAGNOSIS — L25.9 UNSPECIFIED CONTACT DERMATITIS, UNSPECIFIED CAUSE: ICD-10-CM

## 2025-03-05 DIAGNOSIS — L73.8 OTHER SPECIFIED FOLLICULAR DISORDERS: ICD-10-CM

## 2025-03-05 DIAGNOSIS — D22 MELANOCYTIC NEVI: ICD-10-CM

## 2025-03-05 DIAGNOSIS — L81.8 OTHER SPECIFIED DISORDERS OF PIGMENTATION: ICD-10-CM

## 2025-03-05 DIAGNOSIS — D18.0 HEMANGIOMA: ICD-10-CM

## 2025-03-05 DIAGNOSIS — L72.0 EPIDERMAL CYST: ICD-10-CM

## 2025-03-05 PROBLEM — D22.5 MELANOCYTIC NEVI OF TRUNK: Status: ACTIVE | Noted: 2025-03-05

## 2025-03-05 PROBLEM — D22.61 MELANOCYTIC NEVI OF RIGHT UPPER LIMB, INCLUDING SHOULDER: Status: ACTIVE | Noted: 2025-03-05

## 2025-03-05 PROBLEM — D22.4 MELANOCYTIC NEVI OF SCALP AND NECK: Status: ACTIVE | Noted: 2025-03-05

## 2025-03-05 PROBLEM — D22.62 MELANOCYTIC NEVI OF LEFT UPPER LIMB, INCLUDING SHOULDER: Status: ACTIVE | Noted: 2025-03-05

## 2025-03-05 PROBLEM — D22.71 MELANOCYTIC NEVI OF RIGHT LOWER LIMB, INCLUDING HIP: Status: ACTIVE | Noted: 2025-03-05

## 2025-03-05 PROBLEM — D48.5 NEOPLASM OF UNCERTAIN BEHAVIOR OF SKIN: Status: ACTIVE | Noted: 2025-03-05

## 2025-03-05 PROBLEM — D22.72 MELANOCYTIC NEVI OF LEFT LOWER LIMB, INCLUDING HIP: Status: ACTIVE | Noted: 2025-03-05

## 2025-03-05 PROBLEM — D18.01 HEMANGIOMA OF SKIN AND SUBCUTANEOUS TISSUE: Status: ACTIVE | Noted: 2025-03-05

## 2025-03-05 PROBLEM — D22.39 MELANOCYTIC NEVI OF OTHER PARTS OF FACE: Status: ACTIVE | Noted: 2025-03-05

## 2025-03-05 PROCEDURE — ? ADDITIONAL NOTES

## 2025-03-05 PROCEDURE — ? SUNSCREEN TREATMENT REGIMEN

## 2025-03-05 PROCEDURE — ? COUNSELING

## 2025-03-05 PROCEDURE — ? BIOPSY BY SHAVE METHOD

## 2025-03-05 PROCEDURE — 11103 TANGNTL BX SKIN EA SEP/ADDL: CPT

## 2025-03-05 PROCEDURE — 99213 OFFICE O/P EST LOW 20 MIN: CPT | Mod: 25

## 2025-03-05 PROCEDURE — 11102 TANGNTL BX SKIN SINGLE LES: CPT

## 2025-03-05 PROCEDURE — ? PRESCRIPTION

## 2025-03-05 RX ORDER — HYDROCORTISONE 25 MG/G
CREAM TOPICAL
Qty: 30 | Refills: 0 | Status: ERX | COMMUNITY
Start: 2025-03-05

## 2025-03-05 RX ADMIN — HYDROCORTISONE: 25 CREAM TOPICAL at 00:00

## 2025-03-05 ASSESSMENT — LOCATION ZONE DERM
LOCATION ZONE: ARM
LOCATION ZONE: LEG
LOCATION ZONE: SCALP
LOCATION ZONE: NECK
LOCATION ZONE: TRUNK
LOCATION ZONE: HAND
LOCATION ZONE: FACE

## 2025-03-05 ASSESSMENT — LOCATION DETAILED DESCRIPTION DERM
LOCATION DETAILED: RIGHT MEDIAL INFERIOR CHEST
LOCATION DETAILED: LEFT VENTRAL DISTAL FOREARM
LOCATION DETAILED: LEFT PROXIMAL DORSAL FOREARM
LOCATION DETAILED: LEFT EYEBROW
LOCATION DETAILED: RIGHT ANTERIOR DISTAL UPPER ARM
LOCATION DETAILED: LEFT VENTRAL PROXIMAL FOREARM
LOCATION DETAILED: RIGHT INFERIOR MEDIAL FOREHEAD
LOCATION DETAILED: RIGHT VENTRAL DISTAL FOREARM
LOCATION DETAILED: LEFT LATERAL EYEBROW
LOCATION DETAILED: INFERIOR MID FOREHEAD
LOCATION DETAILED: RIGHT DISTAL POSTERIOR UPPER ARM
LOCATION DETAILED: LEFT PROXIMAL POSTERIOR UPPER ARM
LOCATION DETAILED: RIGHT CENTRAL MALAR CHEEK
LOCATION DETAILED: LEFT ANTERIOR PROXIMAL THIGH
LOCATION DETAILED: RIGHT ANTERIOR PROXIMAL THIGH
LOCATION DETAILED: LEFT DISTAL POSTERIOR THIGH
LOCATION DETAILED: LEFT POSTERIOR SHOULDER
LOCATION DETAILED: RIGHT PROXIMAL DORSAL FOREARM
LOCATION DETAILED: RIGHT DISTAL POSTERIOR THIGH
LOCATION DETAILED: LEFT SUPERIOR LATERAL UPPER BACK
LOCATION DETAILED: LEFT BUTTOCK
LOCATION DETAILED: RIGHT VENTRAL PROXIMAL FOREARM
LOCATION DETAILED: LEFT MEDIAL SUPERIOR CHEST
LOCATION DETAILED: LEFT MEDIAL FRONTAL SCALP
LOCATION DETAILED: RIGHT RADIAL PALM
LOCATION DETAILED: LEFT INFERIOR CENTRAL MALAR CHEEK
LOCATION DETAILED: RIGHT LATERAL SUPERIOR CHEST
LOCATION DETAILED: RIGHT ULNAR DORSAL HAND
LOCATION DETAILED: LEFT SUPERIOR PARIETAL SCALP
LOCATION DETAILED: LEFT ANTERIOR DISTAL UPPER ARM
LOCATION DETAILED: RIGHT MEDIAL TRAPEZIAL NECK

## 2025-03-05 ASSESSMENT — LOCATION SIMPLE DESCRIPTION DERM
LOCATION SIMPLE: SCALP
LOCATION SIMPLE: RIGHT HAND
LOCATION SIMPLE: RIGHT POSTERIOR THIGH
LOCATION SIMPLE: LEFT CHEEK
LOCATION SIMPLE: RIGHT FOREARM
LOCATION SIMPLE: CHEST
LOCATION SIMPLE: RIGHT CHEEK
LOCATION SIMPLE: RIGHT UPPER ARM
LOCATION SIMPLE: LEFT SCALP
LOCATION SIMPLE: INFERIOR FOREHEAD
LOCATION SIMPLE: BUTTOCK
LOCATION SIMPLE: LEFT FOREARM
LOCATION SIMPLE: LEFT THIGH
LOCATION SIMPLE: LEFT POSTERIOR THIGH
LOCATION SIMPLE: LEFT UPPER ARM
LOCATION SIMPLE: RIGHT THIGH
LOCATION SIMPLE: RIGHT FOREHEAD
LOCATION SIMPLE: LEFT EYEBROW
LOCATION SIMPLE: LEFT SHOULDER
LOCATION SIMPLE: POSTERIOR NECK
LOCATION SIMPLE: LEFT UPPER BACK

## 2025-03-05 NOTE — PROCEDURE: ADDITIONAL NOTES
Render Risk Assessment In Note?: no
Additional Notes: d/c baby wipes and scented soaps. Recommend gentle Cetaphil cleanser
Detail Level: Simple

## 2025-03-18 ENCOUNTER — APPOINTMENT (OUTPATIENT)
Dept: URBAN - METROPOLITAN AREA CLINIC 6 | Facility: CLINIC | Age: 65
Setting detail: DERMATOLOGY
End: 2025-03-18

## 2025-03-18 PROBLEM — D04.71 CARCINOMA IN SITU OF SKIN OF RIGHT LOWER LIMB, INCLUDING HIP: Status: ACTIVE | Noted: 2025-03-18

## 2025-03-18 PROBLEM — C44.319 BASAL CELL CARCINOMA OF SKIN OF OTHER PARTS OF FACE: Status: ACTIVE | Noted: 2025-03-18

## 2025-03-18 PROCEDURE — 11602 EXC TR-EXT MAL+MARG 1.1-2 CM: CPT

## 2025-03-18 PROCEDURE — ? DIAGNOSIS COMMENT

## 2025-03-18 PROCEDURE — ? EXCISION

## 2025-03-18 PROCEDURE — ? ADDITIONAL NOTES

## 2025-03-18 PROCEDURE — 11641 EXC F/E/E/N/L MAL+MRG 0.6-1: CPT

## 2025-03-18 PROCEDURE — ? PATHOLOGY DISCUSSION

## 2025-03-18 NOTE — PROCEDURE: DIAGNOSIS COMMENT
Detail Level: Detailed
Render Risk Assessment In Note?: no
Comment: A. Left Inferior Forehead, Biopsy by Shave Method\\nPIGMENTED NODULAR BASAL CELL CARCINOMA

## 2025-03-18 NOTE — PROCEDURE: EXCISION
Surgeon Performing The Repair (Optional): Peg Coles
Biopsy Photograph Reviewed: Yes
Previous Accession (Optional): [id number]
Size Of Lesion In Cm: 0.5
X Size Of Lesion In Cm (Optional): 0
Size Of Margin In Cm: 0.4
Anesthesia Volume In Cc: 6
Was An Eye Clamp Used?: No
Eye Clamp Note Details: An eye clamp was used during the procedure.
Excision Method: Saucerization
Saucerization Depth: dermis and superficial adipose tissue
Repair Type: None
Deep Sutures: 3-0 Polysorb
Epidermal Sutures: 4-0 Polysorb
Suturegard Retention Suture: 2-0 Nylon
Retention Suture Bite Size: 3 mm
Length To Time In Minutes Device Was In Place: 10
Number Of Hemigard Strips Per Side: 1
Undermining Type: Entire Wound
Debridement Text: The wound edges were debrided prior to proceeding with the closure to facilitate wound healing.
Helical Rim Text: The closure involved the helical rim.
Vermilion Border Text: The closure involved the vermilion border.
Nostril Rim Text: The closure involved the nostril rim.
Retention Suture Text: Retention sutures were placed to support the closure and prevent dehiscence.
Lab: [id number]
Lab Facility: 
Graft Donor Site Bandage (Optional-Leave Blank If You Don't Want In Note): Steri-strips and a pressure bandage were applied to the donor site.
Epidermal Closure Graft Donor Site (Optional): simple interrupted
Billing Type: Third-Party Bill
Excision Depth: dermis
Scalpel Size: Personna blade
Anesthesia Type: 1% lidocaine with epinephrine
Hemostasis: Electrocautery
Estimated Blood Loss (Cc): minimal
Detail Level: Detailed
Repair Depth: use same depth as excision depth
Epidermal Closure: running subcuticular
Wound Care: Petrolatum
Dressing: pressure dressing
Suturegard Intro: Intraoperative tissue expansion was performed, utilizing the SUTUREGARD device, in order to reduce wound tension.
Suturegard Body: The suture ends were repeatedly re-tightened and re-clamped to achieve the desired tissue expansion.
Hemigard Intro: Due to skin fragility and wound tension, it was decided to use HEMIGARD adhesive retention suture devices to permit a linear closure. The skin was cleaned and dried for a 6cm distance away from the wound. Excessive hair, if present, was removed to allow for adhesion.
Hemigard Postcare Instructions: The HEMIGARD strips are to remain completely dry for at least 5-7 days.
Positioning (Leave Blank If You Do Not Want): The patient was placed in a comfortable position exposing the surgical site.
Pre-Excision Curettage Text (Leave Blank If You Do Not Want): Prior to drawing the surgical margin the visible lesion was removed with electrodesiccation and curettage to clearly define the lesion size.
Complex Repair Preamble Text (Leave Blank If You Do Not Want): Extensive wide undermining was performed.
Intermediate Repair Preamble Text (Leave Blank If You Do Not Want): Undermining was performed with blunt dissection.
Curvilinear Excision Additional Text (Leave Blank If You Do Not Want): The margin was drawn around the clinically apparent lesion.  A curvilinear shape was then drawn on the skin incorporating the lesion and margins.  Incisions were then made along these lines to the appropriate tissue plane and the lesion was extirpated.
Fusiform Excision Additional Text (Leave Blank If You Do Not Want): The margin was drawn around the clinically apparent lesion.  A fusiform shape was then drawn on the skin incorporating the lesion and margins.  Incisions were then made along these lines to the appropriate tissue plane and the lesion was extirpated.
Elliptical Excision Additional Text (Leave Blank If You Do Not Want): The margin was drawn around the clinically apparent lesion.  An elliptical shape was then drawn on the skin incorporating the lesion and margins.  Incisions were then made along these lines to the appropriate tissue plane and the lesion was extirpated.
Saucerization Excision Additional Text (Leave Blank If You Do Not Want): The margin was drawn around the clinically apparent lesion.  Incisions were then made along these lines, in a tangential fashion, to the appropriate tissue plane and the lesion was extirpated.
Slit Excision Additional Text (Leave Blank If You Do Not Want): A linear line was drawn on the skin overlying the lesion. An incision was made slowly until the lesion was visualized.  Once visualized, the lesion was removed with blunt dissection.
Excisional Biopsy Additional Text (Leave Blank If You Do Not Want): The margin was drawn around the clinically apparent lesion. An elliptical shape was then drawn on the skin incorporating the lesion and margins.  Incisions were then made along these lines to the appropriate tissue plane and the lesion was extirpated.
Perilesional Excision Additional Text (Leave Blank If You Do Not Want): The margin was drawn around the clinically apparent lesion. Incisions were then made along these lines to the appropriate tissue plane and the lesion was extirpated.
Repair Performed By Another Provider Text (Leave Blank If You Do Not Want): After the tissue was excised the defect was repaired by another provider.
No Repair - Repaired With Adjacent Surgical Defect Text (Leave Blank If You Do Not Want): After the excision the defect was repaired concurrently with another surgical defect which was in close approximation.
Adjacent Tissue Transfer Text: The defect edges were debeveled with a #15 scalpel blade.  Given the location of the defect and the proximity to free margins an adjacent tissue transfer was deemed most appropriate.  Using a sterile surgical marker, an appropriate flap was drawn incorporating the defect and placing the expected incisions within the relaxed skin tension lines where possible.    The area thus outlined was incised deep to adipose tissue with a #15 scalpel blade.  The skin margins were undermined to an appropriate distance in all directions utilizing iris scissors.
Advancement Flap (Single) Text: The defect edges were debeveled with a #15 scalpel blade.  Given the location of the defect and the proximity to free margins a single advancement flap was deemed most appropriate.  Using a sterile surgical marker, an appropriate advancement flap was drawn incorporating the defect and placing the expected incisions within the relaxed skin tension lines where possible.    The area thus outlined was incised deep to adipose tissue with a #15 scalpel blade.  The skin margins were undermined to an appropriate distance in all directions utilizing iris scissors.
Advancement Flap (Double) Text: The defect edges were debeveled with a #15 scalpel blade.  Given the location of the defect and the proximity to free margins a double advancement flap was deemed most appropriate.  Using a sterile surgical marker, the appropriate advancement flaps were drawn incorporating the defect and placing the expected incisions within the relaxed skin tension lines where possible.    The area thus outlined was incised deep to adipose tissue with a #15 scalpel blade.  The skin margins were undermined to an appropriate distance in all directions utilizing iris scissors.
Burow's Advancement Flap Text: The defect edges were debeveled with a #15 scalpel blade.  Given the location of the defect and the proximity to free margins a Burow's advancement flap was deemed most appropriate.  Using a sterile surgical marker, the appropriate advancement flap was drawn incorporating the defect and placing the expected incisions within the relaxed skin tension lines where possible.    The area thus outlined was incised deep to adipose tissue with a #15 scalpel blade.  The skin margins were undermined to an appropriate distance in all directions utilizing iris scissors.
Chonodrocutaneous Helical Advancement Flap Text: The defect edges were debeveled with a #15 scalpel blade.  Given the location of the defect and the proximity to free margins a chondrocutaneous helical advancement flap was deemed most appropriate.  Using a sterile surgical marker, the appropriate advancement flap was drawn incorporating the defect and placing the expected incisions within the relaxed skin tension lines where possible.    The area thus outlined was incised deep to adipose tissue with a #15 scalpel blade.  The skin margins were undermined to an appropriate distance in all directions utilizing iris scissors.
Crescentic Advancement Flap Text: The defect edges were debeveled with a #15 scalpel blade.  Given the location of the defect and the proximity to free margins a crescentic advancement flap was deemed most appropriate.  Using a sterile surgical marker, the appropriate advancement flap was drawn incorporating the defect and placing the expected incisions within the relaxed skin tension lines where possible.    The area thus outlined was incised deep to adipose tissue with a #15 scalpel blade.  The skin margins were undermined to an appropriate distance in all directions utilizing iris scissors.
A-T Advancement Flap Text: The defect edges were debeveled with a #15 scalpel blade.  Given the location of the defect, shape of the defect and the proximity to free margins an A-T advancement flap was deemed most appropriate.  Using a sterile surgical marker, an appropriate advancement flap was drawn incorporating the defect and placing the expected incisions within the relaxed skin tension lines where possible.    The area thus outlined was incised deep to adipose tissue with a #15 scalpel blade.  The skin margins were undermined to an appropriate distance in all directions utilizing iris scissors.
O-T Advancement Flap Text: The defect edges were debeveled with a #15 scalpel blade.  Given the location of the defect, shape of the defect and the proximity to free margins an O-T advancement flap was deemed most appropriate.  Using a sterile surgical marker, an appropriate advancement flap was drawn incorporating the defect and placing the expected incisions within the relaxed skin tension lines where possible.    The area thus outlined was incised deep to adipose tissue with a #15 scalpel blade.  The skin margins were undermined to an appropriate distance in all directions utilizing iris scissors.
O-L Flap Text: The defect edges were debeveled with a #15 scalpel blade.  Given the location of the defect, shape of the defect and the proximity to free margins an O-L flap was deemed most appropriate.  Using a sterile surgical marker, an appropriate advancement flap was drawn incorporating the defect and placing the expected incisions within the relaxed skin tension lines where possible.    The area thus outlined was incised deep to adipose tissue with a #15 scalpel blade.  The skin margins were undermined to an appropriate distance in all directions utilizing iris scissors.
O-Z Flap Text: The defect edges were debeveled with a #15 scalpel blade.  Given the location of the defect, shape of the defect and the proximity to free margins an O-Z flap was deemed most appropriate.  Using a sterile surgical marker, an appropriate transposition flap was drawn incorporating the defect and placing the expected incisions within the relaxed skin tension lines where possible. The area thus outlined was incised deep to adipose tissue with a #15 scalpel blade.  The skin margins were undermined to an appropriate distance in all directions utilizing iris scissors.
Double O-Z Flap Text: The defect edges were debeveled with a #15 scalpel blade.  Given the location of the defect, shape of the defect and the proximity to free margins a Double O-Z flap was deemed most appropriate.  Using a sterile surgical marker, an appropriate transposition flap was drawn incorporating the defect and placing the expected incisions within the relaxed skin tension lines where possible. The area thus outlined was incised deep to adipose tissue with a #15 scalpel blade.  The skin margins were undermined to an appropriate distance in all directions utilizing iris scissors.
V-Y Flap Text: The defect edges were debeveled with a #15 scalpel blade.  Given the location of the defect, shape of the defect and the proximity to free margins a V-Y flap was deemed most appropriate.  Using a sterile surgical marker, an appropriate advancement flap was drawn incorporating the defect and placing the expected incisions within the relaxed skin tension lines where possible.    The area thus outlined was incised deep to adipose tissue with a #15 scalpel blade.  The skin margins were undermined to an appropriate distance in all directions utilizing iris scissors.
Advancement-Rotation Flap Text: The defect edges were debeveled with a #15 scalpel blade.  Given the location of the defect, shape of the defect and the proximity to free margins an advancement-rotation flap was deemed most appropriate.  Using a sterile surgical marker, an appropriate flap was drawn incorporating the defect and placing the expected incisions within the relaxed skin tension lines where possible. The area thus outlined was incised deep to adipose tissue with a #15 scalpel blade.  The skin margins were undermined to an appropriate distance in all directions utilizing iris scissors.
Mercedes Flap Text: The defect edges were debeveled with a #15 scalpel blade.  Given the location of the defect, shape of the defect and the proximity to free margins a Mercedes flap was deemed most appropriate.  Using a sterile surgical marker, an appropriate advancement flap was drawn incorporating the defect and placing the expected incisions within the relaxed skin tension lines where possible. The area thus outlined was incised deep to adipose tissue with a #15 scalpel blade.  The skin margins were undermined to an appropriate distance in all directions utilizing iris scissors.
Modified Advancement Flap Text: The defect edges were debeveled with a #15 scalpel blade.  Given the location of the defect, shape of the defect and the proximity to free margins a modified advancement flap was deemed most appropriate.  Using a sterile surgical marker, an appropriate advancement flap was drawn incorporating the defect and placing the expected incisions within the relaxed skin tension lines where possible.    The area thus outlined was incised deep to adipose tissue with a #15 scalpel blade.  The skin margins were undermined to an appropriate distance in all directions utilizing iris scissors.
Mucosal Advancement Flap Text: Given the location of the defect, shape of the defect and the proximity to free margins a mucosal advancement flap was deemed most appropriate. Incisions were made with a 15 blade scalpel in the appropriate fashion along the cutaneous vermilion border and the mucosal lip. The remaining actinically damaged mucosal tissue was excised.  The mucosal advancement flap was then elevated to the gingival sulcus with care taken to preserve the neurovascular structures and advanced into the primary defect. Care was taken to ensure that precise realignment of the vermilion border was achieved.
Peng Advancement Flap Text: The defect edges were debeveled with a #15 scalpel blade.  Given the location of the defect, shape of the defect and the proximity to free margins a Peng advancement flap was deemed most appropriate.  Using a sterile surgical marker, an appropriate advancement flap was drawn incorporating the defect and placing the expected incisions within the relaxed skin tension lines where possible. The area thus outlined was incised deep to adipose tissue with a #15 scalpel blade.  The skin margins were undermined to an appropriate distance in all directions utilizing iris scissors.
Hatchet Flap Text: The defect edges were debeveled with a #15 scalpel blade.  Given the location of the defect, shape of the defect and the proximity to free margins a hatchet flap was deemed most appropriate.  Using a sterile surgical marker, an appropriate hatchet flap was drawn incorporating the defect and placing the expected incisions within the relaxed skin tension lines where possible.    The area thus outlined was incised deep to adipose tissue with a #15 scalpel blade.  The skin margins were undermined to an appropriate distance in all directions utilizing iris scissors.
Rotation Flap Text: The defect edges were debeveled with a #15 scalpel blade.  Given the location of the defect, shape of the defect and the proximity to free margins a rotation flap was deemed most appropriate.  Using a sterile surgical marker, an appropriate rotation flap was drawn incorporating the defect and placing the expected incisions within the relaxed skin tension lines where possible.    The area thus outlined was incised deep to adipose tissue with a #15 scalpel blade.  The skin margins were undermined to an appropriate distance in all directions utilizing iris scissors.
Bilateral Rotation Flap Text: The defect edges were debeveled with a #15 scalpel blade. Given the location of the defect, shape of the defect and the proximity to free margins a bilateral rotation flap was deemed most appropriate. Using a sterile surgical marker, an appropriate rotation flap was drawn incorporating the defect and placing the expected incisions within the relaxed skin tension lines where possible. The area thus outlined was incised deep to adipose tissue with a #15 scalpel blade. The skin margins were undermined to an appropriate distance in all directions utilizing iris scissors. Following this, the designed flap was carried over into the primary defect and sutured into place.
Spiral Flap Text: The defect edges were debeveled with a #15 scalpel blade.  Given the location of the defect, shape of the defect and the proximity to free margins a spiral flap was deemed most appropriate.  Using a sterile surgical marker, an appropriate rotation flap was drawn incorporating the defect and placing the expected incisions within the relaxed skin tension lines where possible. The area thus outlined was incised deep to adipose tissue with a #15 scalpel blade.  The skin margins were undermined to an appropriate distance in all directions utilizing iris scissors.
Staged Advancement Flap Text: The defect edges were debeveled with a #15 scalpel blade.  Given the location of the defect, shape of the defect and the proximity to free margins a staged advancement flap was deemed most appropriate.  Using a sterile surgical marker, an appropriate advancement flap was drawn incorporating the defect and placing the expected incisions within the relaxed skin tension lines where possible. The area thus outlined was incised deep to adipose tissue with a #15 scalpel blade.  The skin margins were undermined to an appropriate distance in all directions utilizing iris scissors.
Star Wedge Flap Text: The defect edges were debeveled with a #15 scalpel blade.  Given the location of the defect, shape of the defect and the proximity to free margins a star wedge flap was deemed most appropriate.  Using a sterile surgical marker, an appropriate rotation flap was drawn incorporating the defect and placing the expected incisions within the relaxed skin tension lines where possible. The area thus outlined was incised deep to adipose tissue with a #15 scalpel blade.  The skin margins were undermined to an appropriate distance in all directions utilizing iris scissors.
Transposition Flap Text: The defect edges were debeveled with a #15 scalpel blade.  Given the location of the defect and the proximity to free margins a transposition flap was deemed most appropriate.  Using a sterile surgical marker, an appropriate transposition flap was drawn incorporating the defect.    The area thus outlined was incised deep to adipose tissue with a #15 scalpel blade.  The skin margins were undermined to an appropriate distance in all directions utilizing iris scissors.
Muscle Hinge Flap Text: The defect edges were debeveled with a #15 scalpel blade.  Given the size, depth and location of the defect and the proximity to free margins a muscle hinge flap was deemed most appropriate.  Using a sterile surgical marker, an appropriate hinge flap was drawn incorporating the defect. The area thus outlined was incised with a #15 scalpel blade.  The skin margins were undermined to an appropriate distance in all directions utilizing iris scissors.
Mustarde Flap Text: The defect edges were debeveled with a #15 scalpel blade.  Given the size, depth and location of the defect and the proximity to free margins a Mustarde flap was deemed most appropriate.  Using a sterile surgical marker, an appropriate flap was drawn incorporating the defect. The area thus outlined was incised with a #15 scalpel blade.  The skin margins were undermined to an appropriate distance in all directions utilizing iris scissors.
Nasal Turnover Hinge Flap Text: The defect edges were debeveled with a #15 scalpel blade.  Given the size, depth, location of the defect and the defect being full thickness a nasal turnover hinge flap was deemed most appropriate.  Using a sterile surgical marker, an appropriate hinge flap was drawn incorporating the defect. The area thus outlined was incised with a #15 scalpel blade. The flap was designed to recreate the nasal mucosal lining and the alar rim. The skin margins were undermined to an appropriate distance in all directions utilizing iris scissors.
Nasalis-Muscle-Based Myocutaneous Island Pedicle Flap Text: Using a #15 blade, an incision was made around the donor flap to the level of the nasalis muscle. Wide lateral undermining was then performed in both the subcutaneous plane above the nasalis muscle, and in a submuscular plane just above periosteum. This allowed the formation of a free nasalis muscle axial pedicle (based on the angular artery) which was still attached to the actual cutaneous flap, increasing its mobility and vascular viability. Hemostasis was obtained with pinpoint electrocoagulation. The flap was mobilized into position and the pivotal anchor points positioned and stabilized with buried interrupted sutures. Subcutaneous and dermal tissues were closed in a multilayered fashion with sutures. Tissue redundancies were excised, and the epidermal edges were apposed without significant tension and sutured with sutures.
Nasalis Myocutaneous Flap Text: Using a #15 blade, an incision was made around the donor flap to the level of the nasalis muscle. Wide lateral undermining was then performed in both the subcutaneous plane above the nasalis muscle, and in a submuscular plane just above periosteum. This allowed the formation of a free nasalis muscle axial pedicle which was still attached to the actual cutaneous flap, increasing its mobility and vascular viability. Hemostasis was obtained with pinpoint electrocoagulation. The flap was mobilized into position and the pivotal anchor points positioned and stabilized with buried interrupted sutures. Subcutaneous and dermal tissues were closed in a multilayered fashion with sutures. Tissue redundancies were excised, and the epidermal edges were apposed without significant tension and sutured with sutures.
Nasolabial Transposition Flap Text: The defect edges were debeveled with a #15 scalpel blade.  Given the size, depth and location of the defect and the proximity to free margins a nasolabial transposition flap was deemed most appropriate. Using a sterile surgical marker, an appropriate flap was drawn incorporating the defect. The area thus outlined was incised with a #15 scalpel blade. The skin margins were undermined to an appropriate distance in all directions utilizing iris scissors. Following this, the designed flap was carried into the primary defect and sutured into place.
Orbicularis Oris Muscle Flap Text: The defect edges were debeveled with a #15 scalpel blade.  Given that the defect affected the competency of the oral sphincter an obicularis oris muscle flap was deemed most appropriate to restore this competency and normal muscle function.  Using a sterile surgical marker, an appropriate flap was drawn incorporating the defect. The area thus outlined was incised with a #15 scalpel blade.
Melolabial Transposition Flap Text: The defect edges were debeveled with a #15 scalpel blade.  Given the location of the defect and the proximity to free margins a melolabial flap was deemed most appropriate.  Using a sterile surgical marker, an appropriate melolabial transposition flap was drawn incorporating the defect.    The area thus outlined was incised deep to adipose tissue with a #15 scalpel blade.  The skin margins were undermined to an appropriate distance in all directions utilizing iris scissors.
Rectangular Flap Text: The defect edges were debeveled with a #15 scalpel blade. Given the location of the defect and the proximity to free margins a rectangular flap was deemed most appropriate. Using a sterile surgical marker, an appropriate rectangular flap was drawn incorporating the defect. The area thus outlined was incised deep to adipose tissue with a #15 scalpel blade. The skin margins were undermined to an appropriate distance in all directions utilizing iris scissors. Following this, the designed flap was carried over into the primary defect and sutured into place.
Rhombic Flap Text: The defect edges were debeveled with a #15 scalpel blade.  Given the location of the defect and the proximity to free margins a rhombic flap was deemed most appropriate.  Using a sterile surgical marker, an appropriate rhombic flap was drawn incorporating the defect.    The area thus outlined was incised deep to adipose tissue with a #15 scalpel blade.  The skin margins were undermined to an appropriate distance in all directions utilizing iris scissors.
Rhomboid Transposition Flap Text: The defect edges were debeveled with a #15 scalpel blade.  Given the location of the defect and the proximity to free margins a rhomboid transposition flap was deemed most appropriate.  Using a sterile surgical marker, an appropriate rhomboid flap was drawn incorporating the defect.    The area thus outlined was incised deep to adipose tissue with a #15 scalpel blade.  The skin margins were undermined to an appropriate distance in all directions utilizing iris scissors.
Bi-Rhombic Flap Text: The defect edges were debeveled with a #15 scalpel blade.  Given the location of the defect and the proximity to free margins a bi-rhombic flap was deemed most appropriate.  Using a sterile surgical marker, an appropriate rhombic flap was drawn incorporating the defect. The area thus outlined was incised deep to adipose tissue with a #15 scalpel blade.  The skin margins were undermined to an appropriate distance in all directions utilizing iris scissors.
Helical Rim Advancement Flap Text: The defect edges were debeveled with a #15 blade scalpel.  Given the location of the defect and the proximity to free margins (helical rim) a double helical rim advancement flap was deemed most appropriate.  Using a sterile surgical marker, the appropriate advancement flaps were drawn incorporating the defect and placing the expected incisions between the helical rim and antihelix where possible.  The area thus outlined was incised through and through with a #15 scalpel blade.  With a skin hook and iris scissors, the flaps were gently and sharply undermined and freed up.
Bilateral Helical Rim Advancement Flap Text: The defect edges were debeveled with a #15 blade scalpel.  Given the location of the defect and the proximity to free margins (helical rim) a bilateral helical rim advancement flap was deemed most appropriate.  Using a sterile surgical marker, the appropriate advancement flaps were drawn incorporating the defect and placing the expected incisions between the helical rim and antihelix where possible.  The area thus outlined was incised through and through with a #15 scalpel blade.  With a skin hook and iris scissors, the flaps were gently and sharply undermined and freed up.
Ear Star Wedge Flap Text: The defect edges were debeveled with a #15 blade scalpel.  Given the location of the defect and the proximity to free margins (helical rim) an ear star wedge flap was deemed most appropriate.  Using a sterile surgical marker, the appropriate flap was drawn incorporating the defect and placing the expected incisions between the helical rim and antihelix where possible.  The area thus outlined was incised through and through with a #15 scalpel blade.
Flip-Flop Flap Text: The defect edges were debeveled with a #15 blade scalpel.  Given the location of the defect and the proximity to free margins a flip-flop flap was deemed most appropriate. Using a sterile surgical marker, the appropriate flap was drawn incorporating the defect and placing the expected incisions between the helical rim and antihelix where possible.  The area thus outlined was incised through and through with a #15 scalpel blade. Following this, the designed flap was carried over into the primary defect and sutured into place.
Banner Transposition Flap Text: The defect edges were debeveled with a #15 scalpel blade.  Given the location of the defect and the proximity to free margins a Banner transposition flap was deemed most appropriate.  Using a sterile surgical marker, an appropriate flap drawn around the defect. The area thus outlined was incised deep to adipose tissue with a #15 scalpel blade.  The skin margins were undermined to an appropriate distance in all directions utilizing iris scissors.
Bilobed Flap Text: The defect edges were debeveled with a #15 scalpel blade.  Given the location of the defect and the proximity to free margins a bilobe flap was deemed most appropriate.  Using a sterile surgical marker, an appropriate bilobe flap drawn around the defect.    The area thus outlined was incised deep to adipose tissue with a #15 scalpel blade.  The skin margins were undermined to an appropriate distance in all directions utilizing iris scissors.
Bilobed Transposition Flap Text: The defect edges were debeveled with a #15 scalpel blade.  Given the location of the defect and the proximity to free margins a bilobed transposition flap was deemed most appropriate.  Using a sterile surgical marker, an appropriate bilobe flap drawn around the defect.    The area thus outlined was incised deep to adipose tissue with a #15 scalpel blade.  The skin margins were undermined to an appropriate distance in all directions utilizing iris scissors.
Trilobed Flap Text: The defect edges were debeveled with a #15 scalpel blade.  Given the location of the defect and the proximity to free margins a trilobed flap was deemed most appropriate.  Using a sterile surgical marker, an appropriate trilobed flap drawn around the defect.    The area thus outlined was incised deep to adipose tissue with a #15 scalpel blade.  The skin margins were undermined to an appropriate distance in all directions utilizing iris scissors.
Dorsal Nasal Flap Text: The defect edges were debeveled with a #15 scalpel blade.  Given the location of the defect and the proximity to free margins a dorsal nasal flap was deemed most appropriate.  Using a sterile surgical marker, an appropriate dorsal nasal flap was drawn around the defect.    The area thus outlined was incised deep to adipose tissue with a #15 scalpel blade.  The skin margins were undermined to an appropriate distance in all directions utilizing iris scissors.
Island Pedicle Flap Text: The defect edges were debeveled with a #15 scalpel blade.  Given the location of the defect, shape of the defect and the proximity to free margins an island pedicle advancement flap was deemed most appropriate.  Using a sterile surgical marker, an appropriate advancement flap was drawn incorporating the defect, outlining the appropriate donor tissue and placing the expected incisions within the relaxed skin tension lines where possible.    The area thus outlined was incised deep to adipose tissue with a #15 scalpel blade.  The skin margins were undermined to an appropriate distance in all directions around the primary defect and laterally outward around the island pedicle utilizing iris scissors.  There was minimal undermining beneath the pedicle flap.
Island Pedicle Flap With Canthal Suspension Text: The defect edges were debeveled with a #15 scalpel blade.  Given the location of the defect, shape of the defect and the proximity to free margins an island pedicle advancement flap was deemed most appropriate.  Using a sterile surgical marker, an appropriate advancement flap was drawn incorporating the defect, outlining the appropriate donor tissue and placing the expected incisions within the relaxed skin tension lines where possible. The area thus outlined was incised deep to adipose tissue with a #15 scalpel blade.  The skin margins were undermined to an appropriate distance in all directions around the primary defect and laterally outward around the island pedicle utilizing iris scissors.  There was minimal undermining beneath the pedicle flap. A suspension suture was placed in the canthal tendon to prevent tension and prevent ectropion.
Alar Island Pedicle Flap Text: The defect edges were debeveled with a #15 scalpel blade.  Given the location of the defect, shape of the defect and the proximity to the alar rim an island pedicle advancement flap was deemed most appropriate.  Using a sterile surgical marker, an appropriate advancement flap was drawn incorporating the defect, outlining the appropriate donor tissue and placing the expected incisions within the nasal ala running parallel to the alar rim. The area thus outlined was incised with a #15 scalpel blade.  The skin margins were undermined minimally to an appropriate distance in all directions around the primary defect and laterally outward around the island pedicle utilizing iris scissors.  There was minimal undermining beneath the pedicle flap.
Double Island Pedicle Flap Text: The defect edges were debeveled with a #15 scalpel blade.  Given the location of the defect, shape of the defect and the proximity to free margins a double island pedicle advancement flap was deemed most appropriate.  Using a sterile surgical marker, an appropriate advancement flap was drawn incorporating the defect, outlining the appropriate donor tissue and placing the expected incisions within the relaxed skin tension lines where possible.    The area thus outlined was incised deep to adipose tissue with a #15 scalpel blade.  The skin margins were undermined to an appropriate distance in all directions around the primary defect and laterally outward around the island pedicle utilizing iris scissors.  There was minimal undermining beneath the pedicle flap.
Island Pedicle Flap-Requiring Vessel Identification Text: The defect edges were debeveled with a #15 scalpel blade.  Given the location of the defect, shape of the defect and the proximity to free margins an island pedicle advancement flap was deemed most appropriate.  Using a sterile surgical marker, an appropriate advancement flap was drawn, based on the axial vessel mentioned above, incorporating the defect, outlining the appropriate donor tissue and placing the expected incisions within the relaxed skin tension lines where possible.    The area thus outlined was incised deep to adipose tissue with a #15 scalpel blade.  The skin margins were undermined to an appropriate distance in all directions around the primary defect and laterally outward around the island pedicle utilizing iris scissors.  There was minimal undermining beneath the pedicle flap.
Keystone Flap Text: The defect edges were debeveled with a #15 scalpel blade.  Given the location of the defect, shape of the defect a keystone flap was deemed most appropriate.  Using a sterile surgical marker, an appropriate keystone flap was drawn incorporating the defect, outlining the appropriate donor tissue and placing the expected incisions within the relaxed skin tension lines where possible. The area thus outlined was incised deep to adipose tissue with a #15 scalpel blade.  The skin margins were undermined to an appropriate distance in all directions around the primary defect and laterally outward around the flap utilizing iris scissors.
O-T Plasty Text: The defect edges were debeveled with a #15 scalpel blade.  Given the location of the defect, shape of the defect and the proximity to free margins an O-T plasty was deemed most appropriate.  Using a sterile surgical marker, an appropriate O-T plasty was drawn incorporating the defect and placing the expected incisions within the relaxed skin tension lines where possible.    The area thus outlined was incised deep to adipose tissue with a #15 scalpel blade.  The skin margins were undermined to an appropriate distance in all directions utilizing iris scissors.
O-Z Plasty Text: The defect edges were debeveled with a #15 scalpel blade.  Given the location of the defect, shape of the defect and the proximity to free margins an O-Z plasty (double transposition flap) was deemed most appropriate.  Using a sterile surgical marker, the appropriate transposition flaps were drawn incorporating the defect and placing the expected incisions within the relaxed skin tension lines where possible.    The area thus outlined was incised deep to adipose tissue with a #15 scalpel blade.  The skin margins were undermined to an appropriate distance in all directions utilizing iris scissors.  Hemostasis was achieved with electrocautery.  The flaps were then transposed into place, one clockwise and the other counterclockwise, and anchored with interrupted buried subcutaneous sutures.
Double O-Z Plasty Text: The defect edges were debeveled with a #15 scalpel blade.  Given the location of the defect, shape of the defect and the proximity to free margins a Double O-Z plasty (double transposition flap) was deemed most appropriate.  Using a sterile surgical marker, the appropriate transposition flaps were drawn incorporating the defect and placing the expected incisions within the relaxed skin tension lines where possible. The area thus outlined was incised deep to adipose tissue with a #15 scalpel blade.  The skin margins were undermined to an appropriate distance in all directions utilizing iris scissors.  Hemostasis was achieved with electrocautery.  The flaps were then transposed into place, one clockwise and the other counterclockwise, and anchored with interrupted buried subcutaneous sutures.
V-Y Plasty Text: The defect edges were debeveled with a #15 scalpel blade.  Given the location of the defect, shape of the defect and the proximity to free margins an V-Y advancement flap was deemed most appropriate.  Using a sterile surgical marker, an appropriate advancement flap was drawn incorporating the defect and placing the expected incisions within the relaxed skin tension lines where possible.    The area thus outlined was incised deep to adipose tissue with a #15 scalpel blade.  The skin margins were undermined to an appropriate distance in all directions utilizing iris scissors.
H Plasty Text: Given the location of the defect, shape of the defect and the proximity to free margins a H-plasty was deemed most appropriate for repair.  Using a sterile surgical marker, the appropriate advancement arms of the H-plasty were drawn incorporating the defect and placing the expected incisions within the relaxed skin tension lines where possible. The area thus outlined was incised deep to adipose tissue with a #15 scalpel blade. The skin margins were undermined to an appropriate distance in all directions utilizing iris scissors.  The opposing advancement arms were then advanced into place in opposite direction and anchored with interrupted buried subcutaneous sutures.
W Plasty Text: The lesion was extirpated to the level of the fat with a #15 scalpel blade.  Given the location of the defect, shape of the defect and the proximity to free margins a W-plasty was deemed most appropriate for repair.  Using a sterile surgical marker, the appropriate transposition arms of the W-plasty were drawn incorporating the defect and placing the expected incisions within the relaxed skin tension lines where possible.    The area thus outlined was incised deep to adipose tissue with a #15 scalpel blade.  The skin margins were undermined to an appropriate distance in all directions utilizing iris scissors.  The opposing transposition arms were then transposed into place in opposite direction and anchored with interrupted buried subcutaneous sutures.
Z Plasty Text: The lesion was extirpated to the level of the fat with a #15 scalpel blade.  Given the location of the defect, shape of the defect and the proximity to free margins a Z-plasty was deemed most appropriate for repair.  Using a sterile surgical marker, the appropriate transposition arms of the Z-plasty were drawn incorporating the defect and placing the expected incisions within the relaxed skin tension lines where possible.    The area thus outlined was incised deep to adipose tissue with a #15 scalpel blade.  The skin margins were undermined to an appropriate distance in all directions utilizing iris scissors.  The opposing transposition arms were then transposed into place in opposite direction and anchored with interrupted buried subcutaneous sutures.
Double Z Plasty Text: The lesion was extirpated to the level of the fat with a #15 scalpel blade. Given the location of the defect, shape of the defect and the proximity to free margins a double Z-plasty was deemed most appropriate for repair. Using a sterile surgical marker, the appropriate transposition arms of the double Z-plasty were drawn incorporating the defect and placing the expected incisions within the relaxed skin tension lines where possible. The area thus outlined was incised deep to adipose tissue with a #15 scalpel blade. The skin margins were undermined to an appropriate distance in all directions utilizing iris scissors. The opposing transposition arms were then transposed and carried over into place in opposite direction and anchored with interrupted buried subcutaneous sutures.
Zygomaticofacial Flap Text: Given the location of the defect, shape of the defect and the proximity to free margins a zygomaticofacial flap was deemed most appropriate for repair.  Using a sterile surgical marker, the appropriate flap was drawn incorporating the defect and placing the expected incisions within the relaxed skin tension lines where possible. The area thus outlined was incised deep to adipose tissue with a #15 scalpel blade with preservation of a vascular pedicle.  The skin margins were undermined to an appropriate distance in all directions utilizing iris scissors.  The flap was then placed into the defect and anchored with interrupted buried subcutaneous sutures.
Cheek Interpolation Flap Text: A decision was made to reconstruct the defect utilizing an interpolation axial flap and a staged reconstruction.  A telfa template was made of the defect.  This telfa template was then used to outline the Cheek Interpolation flap.  The donor area for the pedicle flap was then injected with anesthesia.  The flap was excised through the skin and subcutaneous tissue down to the layer of the underlying musculature.  The interpolation flap was carefully excised within this deep plane to maintain its blood supply.  The edges of the donor site were undermined.   The donor site was closed in a primary fashion.  The pedicle was then rotated into position and sutured.  Once the tube was sutured into place, adequate blood supply was confirmed with blanching and refill.  The pedicle was then wrapped with xeroform gauze and dressed appropriately with a telfa and gauze bandage to ensure continued blood supply and protect the attached pedicle.
Cheek-To-Nose Interpolation Flap Text: A decision was made to reconstruct the defect utilizing an interpolation axial flap and a staged reconstruction.  A telfa template was made of the defect.  This telfa template was then used to outline the Cheek-To-Nose Interpolation flap.  The donor area for the pedicle flap was then injected with anesthesia.  The flap was excised through the skin and subcutaneous tissue down to the layer of the underlying musculature.  The interpolation flap was carefully excised within this deep plane to maintain its blood supply.  The edges of the donor site were undermined.   The donor site was closed in a primary fashion.  The pedicle was then rotated into position and sutured.  Once the tube was sutured into place, adequate blood supply was confirmed with blanching and refill.  The pedicle was then wrapped with xeroform gauze and dressed appropriately with a telfa and gauze bandage to ensure continued blood supply and protect the attached pedicle.
Interpolation Flap Text: A decision was made to reconstruct the defect utilizing an interpolation axial flap and a staged reconstruction.  A telfa template was made of the defect.  This telfa template was then used to outline the interpolation flap.  The donor area for the pedicle flap was then injected with anesthesia.  The flap was excised through the skin and subcutaneous tissue down to the layer of the underlying musculature.  The interpolation flap was carefully excised within this deep plane to maintain its blood supply.  The edges of the donor site were undermined.   The donor site was closed in a primary fashion.  The pedicle was then rotated into position and sutured.  Once the tube was sutured into place, adequate blood supply was confirmed with blanching and refill.  The pedicle was then wrapped with xeroform gauze and dressed appropriately with a telfa and gauze bandage to ensure continued blood supply and protect the attached pedicle.
Melolabial Interpolation Flap Text: A decision was made to reconstruct the defect utilizing an interpolation axial flap and a staged reconstruction.  A telfa template was made of the defect.  This telfa template was then used to outline the melolabial interpolation flap.  The donor area for the pedicle flap was then injected with anesthesia.  The flap was excised through the skin and subcutaneous tissue down to the layer of the underlying musculature.  The pedicle flap was carefully excised within this deep plane to maintain its blood supply.  The edges of the donor site were undermined.   The donor site was closed in a primary fashion.  The pedicle was then rotated into position and sutured.  Once the tube was sutured into place, adequate blood supply was confirmed with blanching and refill.  The pedicle was then wrapped with xeroform gauze and dressed appropriately with a telfa and gauze bandage to ensure continued blood supply and protect the attached pedicle.
Mastoid Interpolation Flap Text: A decision was made to reconstruct the defect utilizing an interpolation axial flap and a staged reconstruction.  A telfa template was made of the defect.  This telfa template was then used to outline the mastoid interpolation flap.  The donor area for the pedicle flap was then injected with anesthesia.  The flap was excised through the skin and subcutaneous tissue down to the layer of the underlying musculature.  The pedicle flap was carefully excised within this deep plane to maintain its blood supply.  The edges of the donor site were undermined.   The donor site was closed in a primary fashion.  The pedicle was then rotated into position and sutured.  Once the tube was sutured into place, adequate blood supply was confirmed with blanching and refill.  The pedicle was then wrapped with xeroform gauze and dressed appropriately with a telfa and gauze bandage to ensure continued blood supply and protect the attached pedicle.
Posterior Auricular Interpolation Flap Text: A decision was made to reconstruct the defect utilizing an interpolation axial flap and a staged reconstruction.  A telfa template was made of the defect.  This telfa template was then used to outline the posterior auricular interpolation flap.  The donor area for the pedicle flap was then injected with anesthesia.  The flap was excised through the skin and subcutaneous tissue down to the layer of the underlying musculature.  The pedicle flap was carefully excised within this deep plane to maintain its blood supply.  The edges of the donor site were undermined.   The donor site was closed in a primary fashion.  The pedicle was then rotated into position and sutured.  Once the tube was sutured into place, adequate blood supply was confirmed with blanching and refill.  The pedicle was then wrapped with xeroform gauze and dressed appropriately with a telfa and gauze bandage to ensure continued blood supply and protect the attached pedicle.
Paramedian Forehead Flap Text: A decision was made to reconstruct the defect utilizing an interpolation axial flap and a staged reconstruction.  A telfa template was made of the defect.  This telfa template was then used to outline the paramedian forehead pedicle flap.  The donor area for the pedicle flap was then injected with anesthesia.  The flap was excised through the skin and subcutaneous tissue down to the layer of the underlying musculature.  The pedicle flap was carefully excised within this deep plane to maintain its blood supply.  The edges of the donor site were undermined.   The donor site was closed in a primary fashion.  The pedicle was then rotated into position and sutured.  Once the tube was sutured into place, adequate blood supply was confirmed with blanching and refill.  The pedicle was then wrapped with xeroform gauze and dressed appropriately with a telfa and gauze bandage to ensure continued blood supply and protect the attached pedicle.
Abbe Flap (Upper To Lower Lip) Text: The defect of the lower lip was assessed and measured.  Given the location and size of the defect, an Abbe flap was deemed most appropriate.  Using a sterile surgical marker, an appropriate Abbe flap was measured and drawn on the upper lip. Local anesthesia was then infiltrated.  A scalpel was then used to incise the upper lip through and through the skin, vermilion, muscle and mucosa, leaving the flap pedicled on the opposite side.  The flap was then rotated and transferred to the lower lip defect.  The flap was then sutured into place with a three layer technique, closing the orbicularis oris muscle layer with subcutaneous buried sutures, followed by a mucosal layer and an epidermal layer.
Abbe Flap (Lower To Upper Lip) Text: The defect of the upper lip was assessed and measured.  Given the location and size of the defect, an Abbe flap was deemed most appropriate.  Using a sterile surgical marker, an appropriate Abbe flap was measured and drawn on the lower lip. Local anesthesia was then infiltrated. A scalpel was then used to incise the upper lip through and through the skin, vermilion, muscle and mucosa, leaving the flap pedicled on the opposite side.  The flap was then rotated and transferred to the lower lip defect.  The flap was then sutured into place with a three layer technique, closing the orbicularis oris muscle layer with subcutaneous buried sutures, followed by a mucosal layer and an epidermal layer.
Estlander Flap (Upper To Lower Lip) Text: The defect of the lower lip was assessed and measured.  Given the location and size of the defect, an Estlander flap was deemed most appropriate.  Using a sterile surgical marker, an appropriate Estlander flap was measured and drawn on the upper lip. Local anesthesia was then infiltrated. A scalpel was then used to incise the lateral aspect of the flap, through skin, muscle and mucosa, leaving the flap pedicled medially.  The flap was then rotated and positioned to fill the lower lip defect.  The flap was then sutured into place with a three layer technique, closing the orbicularis oris muscle layer with subcutaneous buried sutures, followed by a mucosal layer and an epidermal layer.
Lip Wedge Excision Repair Text: Given the location of the defect and the proximity to free margins a full thickness wedge repair was deemed most appropriate.  Using a sterile surgical marker, the appropriate repair was drawn incorporating the defect and placing the expected incisions perpendicular to the vermilion border.  The vermilion border was also meticulously outlined to ensure appropriate reapproximation during the repair.  The area thus outlined was incised through and through with a #15 scalpel blade.  The muscularis and dermis were reaproximated with deep sutures following hemostasis. Care was taken to realign the vermilion border before proceeding with the superficial closure.  Once the vermilion was realigned the superfical and mucosal closure was finished.
Ftsg Text: The defect edges were debeveled with a #15 scalpel blade.  Given the location of the defect, shape of the defect and the proximity to free margins a full thickness skin graft was deemed most appropriate.  Using a sterile surgical marker, the primary defect shape was transferred to the donor site. The area thus outlined was incised deep to adipose tissue with a #15 scalpel blade.  The harvested graft was then trimmed of adipose tissue until only dermis and epidermis was left.  The skin margins of the secondary defect were undermined to an appropriate distance in all directions utilizing iris scissors.  The secondary defect was closed with interrupted buried subcutaneous sutures.  The skin edges were then re-apposed with running  sutures.  The skin graft was then placed in the primary defect and oriented appropriately.
Split-Thickness Skin Graft Text: The defect edges were debeveled with a #15 scalpel blade.  Given the location of the defect, shape of the defect and the proximity to free margins a split thickness skin graft was deemed most appropriate.  Using a sterile surgical marker, the primary defect shape was transferred to the donor site. The split thickness graft was then harvested.  The skin graft was then placed in the primary defect and oriented appropriately.
Pinch Graft Text: The defect edges were debeveled with a #15 scalpel blade. Given the location of the defect, shape of the defect and the proximity to free margins a pinch graft was deemed most appropriate. Using a sterile surgical marker, the primary defect shape was transferred to the donor site. The area thus outlined was incised deep to adipose tissue with a #15 scalpel blade.  The harvested graft was then trimmed of adipose tissue until only dermis and epidermis was left. The skin margins of the secondary defect were undermined to an appropriate distance in all directions utilizing iris scissors.  The secondary defect was closed with interrupted buried subcutaneous sutures.  The skin edges were then re-apposed with running  sutures.  The skin graft was then placed in the primary defect and oriented appropriately.
Burow's Graft Text: The defect edges were debeveled with a #15 scalpel blade.  Given the location of the defect, shape of the defect, the proximity to free margins and the presence of a standing cone deformity a Burow's skin graft was deemed most appropriate. The standing cone was removed and this tissue was then trimmed to the shape of the primary defect. The adipose tissue was also removed until only dermis and epidermis were left.  The skin margins of the secondary defect were undermined to an appropriate distance in all directions utilizing iris scissors.  The secondary defect was closed with interrupted buried subcutaneous sutures.  The skin edges were then re-apposed with running  sutures.  The skin graft was then placed in the primary defect and oriented appropriately.
Cartilage Graft Text: The defect edges were debeveled with a #15 scalpel blade.  Given the location of the defect, shape of the defect, the fact the defect involved a full thickness cartilage defect a cartilage graft was deemed most appropriate.  An appropriate donor site was identified, cleansed, and anesthetized. The cartilage graft was then harvested and transferred to the recipient site, oriented appropriately and then sutured into place.  The secondary defect was then repaired using a primary closure.
Composite Graft Text: The defect edges were debeveled with a #15 scalpel blade.  Given the location of the defect, shape of the defect, the proximity to free margins and the fact the defect was full thickness a composite graft was deemed most appropriate.  The defect was outline and then transferred to the donor site.  A full thickness graft was then excised from the donor site. The graft was then placed in the primary defect, oriented appropriately and then sutured into place.  The secondary defect was then repaired using a primary closure.
Epidermal Autograft Text: The defect edges were debeveled with a #15 scalpel blade.  Given the location of the defect, shape of the defect and the proximity to free margins an epidermal autograft was deemed most appropriate.  Using a sterile surgical marker, the primary defect shape was transferred to the donor site. The epidermal graft was then harvested.  The skin graft was then placed in the primary defect and oriented appropriately.
Dermal Autograft Text: The defect edges were debeveled with a #15 scalpel blade.  Given the location of the defect, shape of the defect and the proximity to free margins a dermal autograft was deemed most appropriate.  Using a sterile surgical marker, the primary defect shape was transferred to the donor site. The area thus outlined was incised deep to adipose tissue with a #15 scalpel blade.  The harvested graft was then trimmed of adipose and epidermal tissue until only dermis was left.  The skin graft was then placed in the primary defect and oriented appropriately.
Skin Substitute Text: The defect edges were debeveled with a #15 scalpel blade.  Given the location of the defect, shape of the defect and the proximity to free margins a skin substitute graft was deemed most appropriate.  The graft material was trimmed to fit the size of the defect. The graft was then placed in the primary defect and oriented appropriately.
Tissue Cultured Epidermal Autograft Text: The defect edges were debeveled with a #15 scalpel blade.  Given the location of the defect, shape of the defect and the proximity to free margins a tissue cultured epidermal autograft was deemed most appropriate.  The graft was then trimmed to fit the size of the defect.  The graft was then placed in the primary defect and oriented appropriately.
Xenograft Text: The defect edges were debeveled with a #15 scalpel blade.  Given the location of the defect, shape of the defect and the proximity to free margins a xenograft was deemed most appropriate.  The graft was then trimmed to fit the size of the defect.  The graft was then placed in the primary defect and oriented appropriately.
Purse String (Intermediate) Text: Given the location of the defect and the characteristics of the surrounding skin a purse string intermediate closure was deemed most appropriate.  Undermining was performed circumfirentially around the surgical defect.  A purse string suture was then placed and tightened.
Purse String (Simple) Text: Given the location of the defect and the characteristics of the surrounding skin a purse string simple closure was deemed most appropriate.  Undermining was performed circumferentially around the surgical defect.  A purse string suture was then placed and tightened.
Partial Purse String (Intermediate) Text: Given the location of the defect and the characteristics of the surrounding skin an intermediate purse string closure was deemed most appropriate.  Undermining was performed circumferentially around the surgical defect.  A purse string suture was then placed and tightened. Wound tension of the circular defect prevented complete closure of the wound.
Partial Purse String (Simple) Text: Given the location of the defect and the characteristics of the surrounding skin a simple purse string closure was deemed most appropriate.  Undermining was performed circumferentially around the surgical defect.  A purse string suture was then placed and tightened. Wound tension of the circular defect prevented complete closure of the wound.
Complex Repair And Single Advancement Flap Text: The defect edges were debeveled with a #15 scalpel blade.  The primary defect was closed partially with a complex linear closure.  Given the location of the remaining defect, shape of the defect and the proximity to free margins a single advancement flap was deemed most appropriate for complete closure of the defect.  Using a sterile surgical marker, an appropriate advancement flap was drawn incorporating the defect and placing the expected incisions within the relaxed skin tension lines where possible.    The area thus outlined was incised deep to adipose tissue with a #15 scalpel blade.  The skin margins were undermined to an appropriate distance in all directions utilizing iris scissors.
Complex Repair And Double Advancement Flap Text: The defect edges were debeveled with a #15 scalpel blade.  The primary defect was closed partially with a complex linear closure.  Given the location of the remaining defect, shape of the defect and the proximity to free margins a double advancement flap was deemed most appropriate for complete closure of the defect.  Using a sterile surgical marker, an appropriate advancement flap was drawn incorporating the defect and placing the expected incisions within the relaxed skin tension lines where possible.    The area thus outlined was incised deep to adipose tissue with a #15 scalpel blade.  The skin margins were undermined to an appropriate distance in all directions utilizing iris scissors.
Complex Repair And Modified Advancement Flap Text: The defect edges were debeveled with a #15 scalpel blade.  The primary defect was closed partially with a complex linear closure.  Given the location of the remaining defect, shape of the defect and the proximity to free margins a modified advancement flap was deemed most appropriate for complete closure of the defect.  Using a sterile surgical marker, an appropriate advancement flap was drawn incorporating the defect and placing the expected incisions within the relaxed skin tension lines where possible.    The area thus outlined was incised deep to adipose tissue with a #15 scalpel blade.  The skin margins were undermined to an appropriate distance in all directions utilizing iris scissors.
Complex Repair And A-T Advancement Flap Text: The defect edges were debeveled with a #15 scalpel blade.  The primary defect was closed partially with a complex linear closure.  Given the location of the remaining defect, shape of the defect and the proximity to free margins an A-T advancement flap was deemed most appropriate for complete closure of the defect.  Using a sterile surgical marker, an appropriate advancement flap was drawn incorporating the defect and placing the expected incisions within the relaxed skin tension lines where possible.    The area thus outlined was incised deep to adipose tissue with a #15 scalpel blade.  The skin margins were undermined to an appropriate distance in all directions utilizing iris scissors.
Complex Repair And O-T Advancement Flap Text: The defect edges were debeveled with a #15 scalpel blade.  The primary defect was closed partially with a complex linear closure.  Given the location of the remaining defect, shape of the defect and the proximity to free margins an O-T advancement flap was deemed most appropriate for complete closure of the defect.  Using a sterile surgical marker, an appropriate advancement flap was drawn incorporating the defect and placing the expected incisions within the relaxed skin tension lines where possible.    The area thus outlined was incised deep to adipose tissue with a #15 scalpel blade.  The skin margins were undermined to an appropriate distance in all directions utilizing iris scissors.
Complex Repair And O-L Flap Text: The defect edges were debeveled with a #15 scalpel blade.  The primary defect was closed partially with a complex linear closure.  Given the location of the remaining defect, shape of the defect and the proximity to free margins an O-L flap was deemed most appropriate for complete closure of the defect.  Using a sterile surgical marker, an appropriate flap was drawn incorporating the defect and placing the expected incisions within the relaxed skin tension lines where possible.    The area thus outlined was incised deep to adipose tissue with a #15 scalpel blade.  The skin margins were undermined to an appropriate distance in all directions utilizing iris scissors.
Complex Repair And Bilobe Flap Text: The defect edges were debeveled with a #15 scalpel blade.  The primary defect was closed partially with a complex linear closure.  Given the location of the remaining defect, shape of the defect and the proximity to free margins a bilobe flap was deemed most appropriate for complete closure of the defect.  Using a sterile surgical marker, an appropriate advancement flap was drawn incorporating the defect and placing the expected incisions within the relaxed skin tension lines where possible.    The area thus outlined was incised deep to adipose tissue with a #15 scalpel blade.  The skin margins were undermined to an appropriate distance in all directions utilizing iris scissors.
Complex Repair And Melolabial Flap Text: The defect edges were debeveled with a #15 scalpel blade.  The primary defect was closed partially with a complex linear closure.  Given the location of the remaining defect, shape of the defect and the proximity to free margins a melolabial flap was deemed most appropriate for complete closure of the defect.  Using a sterile surgical marker, an appropriate advancement flap was drawn incorporating the defect and placing the expected incisions within the relaxed skin tension lines where possible.    The area thus outlined was incised deep to adipose tissue with a #15 scalpel blade.  The skin margins were undermined to an appropriate distance in all directions utilizing iris scissors.
Complex Repair And Rotation Flap Text: The defect edges were debeveled with a #15 scalpel blade.  The primary defect was closed partially with a complex linear closure.  Given the location of the remaining defect, shape of the defect and the proximity to free margins a rotation flap was deemed most appropriate for complete closure of the defect.  Using a sterile surgical marker, an appropriate advancement flap was drawn incorporating the defect and placing the expected incisions within the relaxed skin tension lines where possible.    The area thus outlined was incised deep to adipose tissue with a #15 scalpel blade.  The skin margins were undermined to an appropriate distance in all directions utilizing iris scissors.
Complex Repair And Rhombic Flap Text: The defect edges were debeveled with a #15 scalpel blade.  The primary defect was closed partially with a complex linear closure.  Given the location of the remaining defect, shape of the defect and the proximity to free margins a rhombic flap was deemed most appropriate for complete closure of the defect.  Using a sterile surgical marker, an appropriate advancement flap was drawn incorporating the defect and placing the expected incisions within the relaxed skin tension lines where possible.    The area thus outlined was incised deep to adipose tissue with a #15 scalpel blade.  The skin margins were undermined to an appropriate distance in all directions utilizing iris scissors.
Complex Repair And Transposition Flap Text: The defect edges were debeveled with a #15 scalpel blade.  The primary defect was closed partially with a complex linear closure.  Given the location of the remaining defect, shape of the defect and the proximity to free margins a transposition flap was deemed most appropriate for complete closure of the defect.  Using a sterile surgical marker, an appropriate advancement flap was drawn incorporating the defect and placing the expected incisions within the relaxed skin tension lines where possible.    The area thus outlined was incised deep to adipose tissue with a #15 scalpel blade.  The skin margins were undermined to an appropriate distance in all directions utilizing iris scissors.
Complex Repair And V-Y Plasty Text: The defect edges were debeveled with a #15 scalpel blade.  The primary defect was closed partially with a complex linear closure.  Given the location of the remaining defect, shape of the defect and the proximity to free margins a V-Y plasty was deemed most appropriate for complete closure of the defect.  Using a sterile surgical marker, an appropriate advancement flap was drawn incorporating the defect and placing the expected incisions within the relaxed skin tension lines where possible.    The area thus outlined was incised deep to adipose tissue with a #15 scalpel blade.  The skin margins were undermined to an appropriate distance in all directions utilizing iris scissors.
Complex Repair And M Plasty Text: The defect edges were debeveled with a #15 scalpel blade.  The primary defect was closed partially with a complex linear closure.  Given the location of the remaining defect, shape of the defect and the proximity to free margins an M plasty was deemed most appropriate for complete closure of the defect.  Using a sterile surgical marker, an appropriate advancement flap was drawn incorporating the defect and placing the expected incisions within the relaxed skin tension lines where possible.    The area thus outlined was incised deep to adipose tissue with a #15 scalpel blade.  The skin margins were undermined to an appropriate distance in all directions utilizing iris scissors.
Complex Repair And Double M Plasty Text: The defect edges were debeveled with a #15 scalpel blade.  The primary defect was closed partially with a complex linear closure.  Given the location of the remaining defect, shape of the defect and the proximity to free margins a double M plasty was deemed most appropriate for complete closure of the defect.  Using a sterile surgical marker, an appropriate advancement flap was drawn incorporating the defect and placing the expected incisions within the relaxed skin tension lines where possible.    The area thus outlined was incised deep to adipose tissue with a #15 scalpel blade.  The skin margins were undermined to an appropriate distance in all directions utilizing iris scissors.
Complex Repair And W Plasty Text: The defect edges were debeveled with a #15 scalpel blade.  The primary defect was closed partially with a complex linear closure.  Given the location of the remaining defect, shape of the defect and the proximity to free margins a W plasty was deemed most appropriate for complete closure of the defect.  Using a sterile surgical marker, an appropriate advancement flap was drawn incorporating the defect and placing the expected incisions within the relaxed skin tension lines where possible.    The area thus outlined was incised deep to adipose tissue with a #15 scalpel blade.  The skin margins were undermined to an appropriate distance in all directions utilizing iris scissors.
Complex Repair And Z Plasty Text: The defect edges were debeveled with a #15 scalpel blade.  The primary defect was closed partially with a complex linear closure.  Given the location of the remaining defect, shape of the defect and the proximity to free margins a Z plasty was deemed most appropriate for complete closure of the defect.  Using a sterile surgical marker, an appropriate advancement flap was drawn incorporating the defect and placing the expected incisions within the relaxed skin tension lines where possible.    The area thus outlined was incised deep to adipose tissue with a #15 scalpel blade.  The skin margins were undermined to an appropriate distance in all directions utilizing iris scissors.
Complex Repair And Dorsal Nasal Flap Text: The defect edges were debeveled with a #15 scalpel blade.  The primary defect was closed partially with a complex linear closure.  Given the location of the remaining defect, shape of the defect and the proximity to free margins a dorsal nasal flap was deemed most appropriate for complete closure of the defect.  Using a sterile surgical marker, an appropriate flap was drawn incorporating the defect and placing the expected incisions within the relaxed skin tension lines where possible.    The area thus outlined was incised deep to adipose tissue with a #15 scalpel blade.  The skin margins were undermined to an appropriate distance in all directions utilizing iris scissors.
Complex Repair And Ftsg Text: The defect edges were debeveled with a #15 scalpel blade.  The primary defect was closed partially with a complex linear closure.  Given the location of the defect, shape of the defect and the proximity to free margins a full thickness skin graft was deemed most appropriate to repair the remaining defect.  The graft was trimmed to fit the size of the remaining defect.  The graft was then placed in the primary defect, oriented appropriately, and sutured into place.
Complex Repair And Burow's Graft Text: The defect edges were debeveled with a #15 scalpel blade.  The primary defect was closed partially with a complex linear closure.  Given the location of the defect, shape of the defect, the proximity to free margins and the presence of a standing cone deformity a Burow's graft was deemed most appropriate to repair the remaining defect.  The graft was trimmed to fit the size of the remaining defect.  The graft was then placed in the primary defect, oriented appropriately, and sutured into place.
Complex Repair And Split-Thickness Skin Graft Text: The defect edges were debeveled with a #15 scalpel blade.  The primary defect was closed partially with a complex linear closure.  Given the location of the defect, shape of the defect and the proximity to free margins a split thickness skin graft was deemed most appropriate to repair the remaining defect.  The graft was trimmed to fit the size of the remaining defect.  The graft was then placed in the primary defect, oriented appropriately, and sutured into place.
Complex Repair And Epidermal Autograft Text: The defect edges were debeveled with a #15 scalpel blade.  The primary defect was closed partially with a complex linear closure.  Given the location of the defect, shape of the defect and the proximity to free margins an epidermal autograft was deemed most appropriate to repair the remaining defect.  The graft was trimmed to fit the size of the remaining defect.  The graft was then placed in the primary defect, oriented appropriately, and sutured into place.
Complex Repair And Dermal Autograft Text: The defect edges were debeveled with a #15 scalpel blade.  The primary defect was closed partially with a complex linear closure.  Given the location of the defect, shape of the defect and the proximity to free margins an dermal autograft was deemed most appropriate to repair the remaining defect.  The graft was trimmed to fit the size of the remaining defect.  The graft was then placed in the primary defect, oriented appropriately, and sutured into place.
Complex Repair And Tissue Cultured Epidermal Autograft Text: The defect edges were debeveled with a #15 scalpel blade.  The primary defect was closed partially with a complex linear closure.  Given the location of the defect, shape of the defect and the proximity to free margins an tissue cultured epidermal autograft was deemed most appropriate to repair the remaining defect.  The graft was trimmed to fit the size of the remaining defect.  The graft was then placed in the primary defect, oriented appropriately, and sutured into place.
Complex Repair And Xenograft Text: The defect edges were debeveled with a #15 scalpel blade.  The primary defect was closed partially with a complex linear closure.  Given the location of the defect, shape of the defect and the proximity to free margins a xenograft was deemed most appropriate to repair the remaining defect.  The graft was trimmed to fit the size of the remaining defect.  The graft was then placed in the primary defect, oriented appropriately, and sutured into place.
Complex Repair And Skin Substitute Graft Text: The defect edges were debeveled with a #15 scalpel blade.  The primary defect was closed partially with a complex linear closure.  Given the location of the remaining defect, shape of the defect and the proximity to free margins a skin substitute graft was deemed most appropriate to repair the remaining defect.  The graft was trimmed to fit the size of the remaining defect.  The graft was then placed in the primary defect, oriented appropriately, and sutured into place.
Include Anticoagulation In Mohs Note?: Please Select the Appropriate Response
Path Notes (To The Dermatopathologist): Please check margins.
Consent was obtained from the patient. The risks and benefits to therapy were discussed in detail. Specifically, the risks of infection, scarring, bleeding, prolonged wound healing, incomplete removal, allergy to anesthesia, nerve injury and recurrence were addressed. Prior to the procedure, the treatment site was clearly identified and confirmed by the patient. All components of Universal Protocol/PAUSE Rule completed.
Post-Care Instructions: I reviewed with the patient in detail post-care instructions. Patient is not to engage in any heavy lifting, exercise, or swimming for the next 14 days. Should the patient develop any fevers, chills, bleeding, severe pain patient will contact the office immediately.
Home Suture Removal Text: Patient was provided a home suture removal kit and will remove their sutures at home.  If they have any questions or difficulties they will call the office.
Where Do You Want The Question To Include Opioid Counseling Located?: Case Summary Tab
Information: Selecting Yes will display possible errors in your note based on the variables you have selected. This validation is only offered as a suggestion for you. PLEASE NOTE THAT THE VALIDATION TEXT WILL BE REMOVED WHEN YOU FINALIZE YOUR NOTE. IF YOU WANT TO FAX A PRELIMINARY NOTE YOU WILL NEED TO TOGGLE THIS TO 'NO' IF YOU DO NOT WANT IT IN YOUR FAXED NOTE.
Size Of Lesion In Cm: 0.3

## 2025-03-29 DIAGNOSIS — E78.5 DYSLIPIDEMIA: ICD-10-CM

## 2025-03-31 RX ORDER — EZETIMIBE 10 MG/1
10 TABLET ORAL
Qty: 90 TABLET | Refills: 0 | Status: SHIPPED | OUTPATIENT
Start: 2025-03-31

## 2025-04-19 ENCOUNTER — APPOINTMENT (OUTPATIENT)
Dept: RADIOLOGY | Facility: MEDICAL CENTER | Age: 65
End: 2025-04-19
Attending: STUDENT IN AN ORGANIZED HEALTH CARE EDUCATION/TRAINING PROGRAM
Payer: COMMERCIAL

## 2025-04-19 ENCOUNTER — HOSPITAL ENCOUNTER (EMERGENCY)
Facility: MEDICAL CENTER | Age: 65
End: 2025-04-19
Attending: STUDENT IN AN ORGANIZED HEALTH CARE EDUCATION/TRAINING PROGRAM
Payer: COMMERCIAL

## 2025-04-19 VITALS
BODY MASS INDEX: 28.04 KG/M2 | RESPIRATION RATE: 18 BRPM | HEART RATE: 79 BPM | TEMPERATURE: 98.4 F | WEIGHT: 185 LBS | OXYGEN SATURATION: 95 % | SYSTOLIC BLOOD PRESSURE: 139 MMHG | DIASTOLIC BLOOD PRESSURE: 65 MMHG | HEIGHT: 68 IN

## 2025-04-19 DIAGNOSIS — M25.472 ANKLE SWELLING, LEFT: ICD-10-CM

## 2025-04-19 PROCEDURE — 73600 X-RAY EXAM OF ANKLE: CPT | Mod: LT

## 2025-04-19 PROCEDURE — 99284 EMERGENCY DEPT VISIT MOD MDM: CPT

## 2025-04-19 PROCEDURE — 94760 N-INVAS EAR/PLS OXIMETRY 1: CPT

## 2025-04-19 RX ORDER — METHYLPREDNISOLONE 4 MG/1
TABLET ORAL
Qty: 1 EACH | Refills: 0 | Status: SHIPPED | OUTPATIENT
Start: 2025-04-19

## 2025-04-20 NOTE — ED TRIAGE NOTES
".  Chief Complaint   Patient presents with    T-5000 Extremity Pain     Patient states he has a chronic reoccurring \"soft tissue\" injury to his left leg. Patient denies recent injury. Patient states he was walking a lot last week and now his pain is the worst he has ever felt.         ./65   Pulse 84   Temp 36.9 °C (98.4 °F) (Temporal)   Resp 18   Ht 1.727 m (5' 8\")   Wt 83.9 kg (185 lb)   SpO2 95%   BMI 28.13 kg/m²     "

## 2025-04-20 NOTE — ED PROVIDER NOTES
"ED Provider Note    CHIEF COMPLAINT  Chief Complaint   Patient presents with    T-5000 Extremity Pain     Patient states he has a chronic reoccurring \"soft tissue\" injury to his left leg. Patient denies recent injury. Patient states he was walking a lot last week and now his pain is the worst he has ever felt.           HPI/ROS  LIMITATION TO HISTORY   Select: : None  OUTSIDE HISTORIAN(S):  Significant other collaborates below history    Víctor Hollis is a 64 y.o. male who presents with left ankle and foot pain after walking a lot last week.  He notes a remote history of gout.  He notes that this happened a few years ago he saw an orthopedist, who stated that he had a soft tissue strain in his foot, he has had it recur once since then in the same location.  He reports some chills denies fever nausea vomiting.  He reports both times he has had this, it is improved after a Medrol Dosepak.  He has tried NSAIDs without improvement.    PAST MEDICAL HISTORY   has a past medical history of Allergy, Elevated coronary artery calcium score, GERD (gastroesophageal reflux disease), Hiatal hernia, Hyperlipidemia, and Hypertension.    SURGICAL HISTORY   has a past surgical history that includes tendon repair (Right, ) and zzz cardiac cath (2019).    FAMILY HISTORY  Family History   Problem Relation Age of Onset    Hypertension Mother     Diabetes Father     Hypertension Sister     Hypertension Brother     Diabetes Brother     Hypertension Son        SOCIAL HISTORY  Social History     Tobacco Use    Smoking status: Former     Current packs/day: 0.00     Average packs/day: 0.3 packs/day for 3.0 years (0.8 ttl pk-yrs)     Types: Cigarettes     Start date:      Quit date:      Years since quittin.3    Smokeless tobacco: Never   Vaping Use    Vaping status: Never Used   Substance and Sexual Activity    Alcohol use: Yes     Alcohol/week: 12.6 oz     Types: 21 Standard drinks or equivalent per week     " "Comment: 2-3 drinks/day     Drug use: No    Sexual activity: Yes     Partners: Female       CURRENT MEDICATIONS  Home Medications    **Home medications have not yet been reviewed for this encounter**         ALLERGIES  Allergies   Allergen Reactions    Other Food Itching     Shell fish; Pt states \"at times skin rash and/or puffy/itchy eyes\"    Keflex [Cephalexin] Nausea     GI UPSET        PHYSICAL EXAM  VITAL SIGNS: /65   Pulse 79   Temp 36.9 °C (98.4 °F) (Temporal)   Resp 18   Ht 1.727 m (5' 8\")   Wt 83.9 kg (185 lb)   SpO2 95%   BMI 28.13 kg/m²    General: Pleasant appearing male, nontoxic no acute distress.  Head: Normocephalic atraumatic  Eyes: Extraocular motion intact  Neck: Supple, no rigidity  Cardiovascular: Regular rate and rhythm via peripheral pulses   Respiratory: equal chest rise and fall, no increased work of breathing  Musculoskeletal: There is tenderness along the ankle joint line, there is no warmth to the joint or overlying skin change.  There is no pain with short range of motion.  There is some tenderness distally to this in the foot. 2+ DP pulses.  Neuro: Alert  Flexes and extends toes, hallices, able to dorsiflex/plantar flex the ankle, sensation intact to light touch, deep, superficial peroneal, tibial, sural and plantar distributions.    Integumentary: No wounds or rashes        RADIOLOGY/PROCEDURES     Radiologist interpretation:  DX-ANKLE 2- VIEWS LEFT   Final Result         1.  No radiographic evidence of acute traumatic injury.          COURSE & MEDICAL DECISION MAKING    ASSESSMENT, COURSE AND PLAN  Care Narrative: This 64-year-old male presents with left foot/ankle swelling and pain not improving with NSAIDs.  Had this twice previously  And each time improved with corticosteroids.  Vital signs are reassuring.  Exam is notable for some tenderness along the joint line, neurovascular intact, no overlying skin changes no pain with short range of motion.  Overall low suspicion " for septic arthritis, I did consider that this patient may have an exacerbation of the existing soft tissue injury, however he may have gout.    Given his failed NSAIDs, he does not have diabetes,  I discussed trying Medrol Dosepak as it has helped previously, I also offered a diagnostic arthrocentesis.  He would prefer to defer this at this time, and given have a low suspicion for septic arthritis I feel this is reasonable.  In any case he seems to be failing NSAIDs, and a Medrol dose pack is reasonable.  He is instructed to follow-up with his primary or orthopedics.  Return precautions.  Discussed potential side effects of steroids.  Patient still agreeable        DISPOSITION AND DISCUSSIONS      Escalation of care considered, and ultimately not performed:Laboratory analysis conceals uric acid, labs, synovial fluid studies.      Decision tools and prescription drugs considered including, but not limited to:  Steroids burst after shared decision making. .    FINAL DIAGNOSIS  1. Ankle swelling, left         Electronically signed by: Lior Multani M.D., 4/19/2025 9:53 PM

## 2025-04-21 ENCOUNTER — PATIENT MESSAGE (OUTPATIENT)
Dept: MEDICAL GROUP | Facility: MEDICAL CENTER | Age: 65
End: 2025-04-21
Payer: COMMERCIAL

## 2025-04-21 DIAGNOSIS — M79.673 PAIN OF FOOT, UNSPECIFIED LATERALITY: Primary | ICD-10-CM

## 2025-04-23 ENCOUNTER — HOSPITAL ENCOUNTER (OUTPATIENT)
Facility: MEDICAL CENTER | Age: 65
End: 2025-04-23
Attending: FAMILY MEDICINE
Payer: COMMERCIAL

## 2025-04-23 ENCOUNTER — RESULTS FOLLOW-UP (OUTPATIENT)
Dept: MEDICAL GROUP | Facility: MEDICAL CENTER | Age: 65
End: 2025-04-23

## 2025-04-23 DIAGNOSIS — R73.03 PREDIABETES: ICD-10-CM

## 2025-04-23 DIAGNOSIS — Z12.5 SCREENING FOR PROSTATE CANCER: ICD-10-CM

## 2025-04-23 DIAGNOSIS — M79.673 PAIN OF FOOT, UNSPECIFIED LATERALITY: ICD-10-CM

## 2025-04-23 DIAGNOSIS — I25.10 CORONARY ARTERY CALCIFICATION: ICD-10-CM

## 2025-04-23 DIAGNOSIS — E55.9 VITAMIN D DEFICIENCY: ICD-10-CM

## 2025-04-23 DIAGNOSIS — K76.9 LIVER LESION: ICD-10-CM

## 2025-04-23 LAB
25(OH)D3 SERPL-MCNC: 28 NG/ML (ref 30–100)
CHOLEST SERPL-MCNC: 78 MG/DL (ref 100–199)
EST. AVERAGE GLUCOSE BLD GHB EST-MCNC: 134 MG/DL
FASTING STATUS PATIENT QL REPORTED: NORMAL
GFR SERPLBLD CREATININE-BSD FMLA CKD-EPI: 95 ML/MIN/1.73 M 2
HBA1C MFR BLD: 6.3 % (ref 4–5.6)
HDLC SERPL-MCNC: 38 MG/DL
LDLC SERPL CALC-MCNC: 25 MG/DL
PSA SERPL-MCNC: 2.78 NG/ML (ref 0–4)
TRIGL SERPL-MCNC: 74 MG/DL (ref 0–149)
URATE SERPL-MCNC: 8 MG/DL (ref 2.5–8.3)

## 2025-04-23 PROCEDURE — 82306 VITAMIN D 25 HYDROXY: CPT

## 2025-04-23 PROCEDURE — 83036 HEMOGLOBIN GLYCOSYLATED A1C: CPT

## 2025-04-23 PROCEDURE — 80061 LIPID PANEL: CPT

## 2025-04-23 PROCEDURE — 80053 COMPREHEN METABOLIC PANEL: CPT

## 2025-04-23 PROCEDURE — 84550 ASSAY OF BLOOD/URIC ACID: CPT

## 2025-04-23 PROCEDURE — 36415 COLL VENOUS BLD VENIPUNCTURE: CPT

## 2025-04-23 PROCEDURE — 84153 ASSAY OF PSA TOTAL: CPT

## 2025-04-24 LAB
ALBUMIN SERPL BCP-MCNC: 4.4 G/DL (ref 3.2–4.9)
ALBUMIN/GLOB SERPL: 1.6 G/DL
ALP SERPL-CCNC: 87 U/L (ref 30–99)
ALT SERPL-CCNC: 41 U/L (ref 2–50)
ANION GAP SERPL CALC-SCNC: 14 MMOL/L (ref 7–16)
AST SERPL-CCNC: 21 U/L (ref 12–45)
BILIRUB SERPL-MCNC: 0.5 MG/DL (ref 0.1–1.5)
BUN SERPL-MCNC: 17 MG/DL (ref 8–22)
CALCIUM ALBUM COR SERPL-MCNC: 9.2 MG/DL (ref 8.5–10.5)
CALCIUM SERPL-MCNC: 9.5 MG/DL (ref 8.5–10.5)
CHLORIDE SERPL-SCNC: 107 MMOL/L (ref 96–112)
CO2 SERPL-SCNC: 21 MMOL/L (ref 20–33)
CREAT SERPL-MCNC: 0.87 MG/DL (ref 0.5–1.4)
GLOBULIN SER CALC-MCNC: 2.8 G/DL (ref 1.9–3.5)
GLUCOSE SERPL-MCNC: 109 MG/DL (ref 65–99)
POTASSIUM SERPL-SCNC: 4.7 MMOL/L (ref 3.6–5.5)
PROT SERPL-MCNC: 7.2 G/DL (ref 6–8.2)
SODIUM SERPL-SCNC: 142 MMOL/L (ref 135–145)

## 2025-05-13 ENCOUNTER — OFFICE VISIT (OUTPATIENT)
Dept: MEDICAL GROUP | Facility: MEDICAL CENTER | Age: 65
End: 2025-05-13
Payer: COMMERCIAL

## 2025-05-13 VITALS
SYSTOLIC BLOOD PRESSURE: 132 MMHG | OXYGEN SATURATION: 97 % | HEART RATE: 82 BPM | DIASTOLIC BLOOD PRESSURE: 54 MMHG | TEMPERATURE: 98.2 F | BODY MASS INDEX: 30.21 KG/M2 | HEIGHT: 67 IN | WEIGHT: 192.46 LBS

## 2025-05-13 DIAGNOSIS — E55.9 VITAMIN D DEFICIENCY: ICD-10-CM

## 2025-05-13 DIAGNOSIS — R73.03 PREDIABETES: ICD-10-CM

## 2025-05-13 DIAGNOSIS — E79.0 ELEVATED URIC ACID IN BLOOD: ICD-10-CM

## 2025-05-13 PROCEDURE — 3075F SYST BP GE 130 - 139MM HG: CPT | Performed by: FAMILY MEDICINE

## 2025-05-13 PROCEDURE — 99214 OFFICE O/P EST MOD 30 MIN: CPT | Performed by: FAMILY MEDICINE

## 2025-05-13 PROCEDURE — 3078F DIAST BP <80 MM HG: CPT | Performed by: FAMILY MEDICINE

## 2025-05-13 RX ORDER — ALLOPURINOL 100 MG/1
100 TABLET ORAL DAILY
Qty: 90 TABLET | Refills: 1 | Status: SHIPPED | OUTPATIENT
Start: 2025-05-13 | End: 2025-05-28 | Stop reason: SDUPTHER

## 2025-05-13 ASSESSMENT — PATIENT HEALTH QUESTIONNAIRE - PHQ9: CLINICAL INTERPRETATION OF PHQ2 SCORE: 0

## 2025-05-13 ASSESSMENT — ENCOUNTER SYMPTOMS
CHILLS: 0
FEVER: 0

## 2025-05-13 NOTE — PROGRESS NOTES
Verbal consent was acquired by the patient to use [employer] ambient listening note generation during this visit.      Art was seen today for follow-up.    Diagnoses and all orders for this visit:    Elevated uric acid in blood  -     allopurinol (ZYLOPRIM) 100 MG Tab; Take 1 Tablet by mouth every day.  -     URIC ACID; Future    Vitamin D deficiency  -     VITAMIN D,25 HYDROXY (DEFICIENCY); Future    Prediabetes  -     HEMOGLOBIN A1C; Future                  Assessment & Plan  1.  Elevated uric acid  Chronic condition, unstable  - Uric acid levels elevated at 8.0 mg/dL  - Physical exam findings include foot pain, leading to an emergency room visit  - Discussed potential for gout and benefits of medication  - Prescribed Allopurinol 100 mg daily, 90-tablet prescription sent to pharmacy  - Advised to follow a low purine diet, instructions will be provided    2.  Prediabetes:  Chronic condition, unstable  - A1c increased, average blood sugar level of 134  - Recent prednisone use and dietary habits may have contributed to the increase  - Discussed importance of cutting back on foods that may raise blood sugar levels  - Improved foot condition and resumption of exercise expected to help lower A1c    3. Low vitamin D:  Chronic condition, unstable  - Vitamin D levels are low  - Currently taking over-the-counter vitamin D supplements, one pill daily and two pills on Sundays and Thursdays  - Advised to increase vitamin D twice daily for 4 days a week    4. Medication management:  - On high dose of statins (80 mg) and Zetia for lipid management  Follow-up with cardiology for management of this condition    Follow-up:  - Follow-up appointment scheduled in 4 months          Chief complaint::Diagnoses of Elevated uric acid in blood, Vitamin D deficiency, and Prediabetes were pertinent to this visit.      History of Present Illness  The patient is a 64-year-old male who presents for a lab follow-up.    Blood Sugar and A1c  -  Fasting blood sugar has improved to 109  - A1c has increased    Kidney and Liver Function  - Kidney and liver function tests returned normal results    Uric Acid and Vitamin D  - Uric acid level is elevated at 8.0  - Vitamin D is low  - Taking vitamin D supplements, one pill daily, and two pills on Sundays and Thursdays    Foot Issues  - Experienced foot issues, initially attributed to long history of running over 35 years  - Cessation of exercise for approximately 5 to 6 weeks within the past 3 months  - Sought emergency care a few weeks ago due to severe pain in right foot, suspected to be gout  - History of a single gout attack in right foot in his 30s, treated with allopurinol  - Prescribed prednisone during recent emergency visit, which may have contributed to elevated A1c    Stomach Condition  - Discontinued use of pantoprazole as stomach condition has improved significantly    Triglycerides and Cholesterol  - Triglycerides and cholesterol levels are low  - Attributes low levels to current statin and Zetia medications        Review of Systems   Constitutional:  Negative for chills and fever.   Musculoskeletal:  Positive for joint pain.          Medications and Allergies:     Current Outpatient Medications   Medication Sig Dispense Refill    allopurinol (ZYLOPRIM) 100 MG Tab Take 1 Tablet by mouth every day. 90 Tablet 1    ezetimibe (ZETIA) 10 MG Tab TAKE ONE TABLET BY MOUTH EVERY DAY 90 Tablet 0    atorvastatin (LIPITOR) 80 MG tablet TAKE 1 TABLET BY MOUTH EVERY EVENING. 90 Tablet 3    amlodipine-benazepril (LOTREL) 10-20 MG per capsule TAKE ONE CAPSULE BY MOUTH EVERY DAY 90 Capsule 3    calcium carbonate (TUMS) 500 MG Chew Tab Chew 600 mg every day.      Cholecalciferol (VITAMIN D) 125 MCG (5000 UT) Cap Take  by mouth.      aspirin (ASA) 81 MG Chew Tab chewable tablet Take 1 Tab by mouth every day. 100 Tab 3    therapeutic multivitamin-minerals (THERAGRAN-M) Tab Take 1 Tab by mouth every day.      Probiotic  "Product (PROBIOTIC FORMULA PO) Take  by mouth.       No current facility-administered medications for this visit.       /54 (BP Location: Left arm, Patient Position: Sitting, BP Cuff Size: Adult)   Pulse 82   Temp 36.8 °C (98.2 °F) (Temporal)   Ht 1.708 m (5' 7.25\")   Wt 87.3 kg (192 lb 7.4 oz)   SpO2 97% , Body mass index is 29.92 kg/m².      Physical Exam  Constitutional:       Appearance: Normal appearance. He is well-developed and well-groomed.   HENT:      Head: Normocephalic and atraumatic.      Right Ear: External ear normal.      Left Ear: External ear normal.   Eyes:      General:         Right eye: No discharge.         Left eye: No discharge.      Conjunctiva/sclera: Conjunctivae normal.   Cardiovascular:      Rate and Rhythm: Normal rate.   Pulmonary:      Effort: Pulmonary effort is normal. No respiratory distress.   Musculoskeletal:      Cervical back: Neck supple.   Skin:     Findings: No rash.   Neurological:      Mental Status: He is alert.   Psychiatric:         Mood and Affect: Mood and affect normal.         Behavior: Behavior normal.            I reviewed with patient lab results resulted on 4/23/2025.        Please note that this dictation was created using voice recognition software. I have made every reasonable attempt to correct obvious errors, but I expect that there are errors of grammar and possibly content that I did not discover before finalizing the note.      "

## 2025-05-28 DIAGNOSIS — E79.0 ELEVATED URIC ACID IN BLOOD: ICD-10-CM

## 2025-05-28 RX ORDER — ALLOPURINOL 100 MG/1
100 TABLET ORAL DAILY
Qty: 90 TABLET | Refills: 3 | Status: SHIPPED | OUTPATIENT
Start: 2025-05-28

## 2025-05-28 NOTE — TELEPHONE ENCOUNTER
Received request via: Pharmacy    Was the patient seen in the last year in this department? Yes    Does the patient have an active prescription (recently filled or refills available) for medication(s) requested? No    [facility]    Does the patient have senior living Plus and need 100-day supply? (This applies to ALL medications) Patient does not have SCP    Last RX sent to [facility] pharmacy

## 2025-06-16 DIAGNOSIS — E78.5 DYSLIPIDEMIA: ICD-10-CM

## 2025-06-17 RX ORDER — EZETIMIBE 10 MG/1
10 TABLET ORAL
Qty: 90 TABLET | Refills: 3 | Status: SHIPPED | OUTPATIENT
Start: 2025-06-17